# Patient Record
Sex: FEMALE | Race: WHITE | NOT HISPANIC OR LATINO | Employment: FULL TIME | URBAN - METROPOLITAN AREA
[De-identification: names, ages, dates, MRNs, and addresses within clinical notes are randomized per-mention and may not be internally consistent; named-entity substitution may affect disease eponyms.]

---

## 2017-05-26 ENCOUNTER — TELEPHONE (OUTPATIENT)
Dept: FAMILY MEDICINE | Facility: CLINIC | Age: 67
End: 2017-05-26

## 2017-05-26 DIAGNOSIS — E04.9 THYROID GOITER: Chronic | ICD-10-CM

## 2017-05-26 DIAGNOSIS — E78.00 HYPERCHOLESTEREMIA: Primary | Chronic | ICD-10-CM

## 2017-05-26 DIAGNOSIS — E03.4 HYPOTHYROIDISM DUE TO ACQUIRED ATROPHY OF THYROID: Chronic | ICD-10-CM

## 2017-05-26 DIAGNOSIS — R53.83 FATIGUE, UNSPECIFIED TYPE: ICD-10-CM

## 2017-05-26 DIAGNOSIS — E55.9 VITAMIN D DEFICIENCY: ICD-10-CM

## 2017-05-26 DIAGNOSIS — Z12.39 SCREENING FOR BREAST CANCER: ICD-10-CM

## 2017-05-26 DIAGNOSIS — I10 BENIGN HYPERTENSION: ICD-10-CM

## 2017-05-26 NOTE — TELEPHONE ENCOUNTER
Spoke to pt and notified her that I will send request once dr gupta gets back. She verbalized understanding. States she knows its not due just yet.

## 2017-05-26 NOTE — TELEPHONE ENCOUNTER
----- Message from RT Fabián sent at 5/26/2017  1:36 PM CDT -----  Contact: pt  or    pt  or , Requesting lab test , Mammogram, and Sonogram for thyroid orders, in and scheduled, please call pt to confirm, thanks.

## 2017-05-28 RX ORDER — ATORVASTATIN CALCIUM 10 MG/1
TABLET, FILM COATED ORAL
Qty: 30 TABLET | Refills: 8 | Status: SHIPPED | OUTPATIENT
Start: 2017-05-28 | End: 2018-01-30 | Stop reason: SDUPTHER

## 2017-05-28 RX ORDER — LEVOTHYROXINE SODIUM 50 UG/1
TABLET ORAL
Qty: 30 TABLET | Refills: 8 | Status: SHIPPED | OUTPATIENT
Start: 2017-05-28 | End: 2018-03-30 | Stop reason: SDUPTHER

## 2017-05-29 NOTE — TELEPHONE ENCOUNTER
Spoke to pt and notified her of orders placed. Pt verbalized understanding and states she will call back tomorrow to schedule.

## 2017-05-29 NOTE — TELEPHONE ENCOUNTER
MESSAGE REVIEWED. WILL ORDER THE LAB, MAMMOGRAM, AND U/S OF THE THYROID, BUT ALL NOT DUE UNTIL AFTER 7/22/17.HALIE

## 2017-06-06 ENCOUNTER — TELEPHONE (OUTPATIENT)
Dept: FAMILY MEDICINE | Facility: CLINIC | Age: 67
End: 2017-06-06

## 2017-06-06 NOTE — TELEPHONE ENCOUNTER
----- Message from Latasha Pike sent at 6/6/2017 10:21 AM CDT -----  Contact:  call [phone]    Calling to  Ask   Questions  About  Urine test  // please call

## 2017-06-13 DIAGNOSIS — M25.561 RIGHT KNEE PAIN, UNSPECIFIED CHRONICITY: Primary | ICD-10-CM

## 2017-06-14 ENCOUNTER — HOSPITAL ENCOUNTER (OUTPATIENT)
Dept: RADIOLOGY | Facility: HOSPITAL | Age: 67
Discharge: HOME | End: 2017-06-14
Attending: ORTHOPAEDIC SURGERY
Payer: COMMERCIAL

## 2017-06-14 ENCOUNTER — OFFICE VISIT (OUTPATIENT)
Dept: ORTHOPEDICS | Facility: CLINIC | Age: 67
End: 2017-06-14
Payer: COMMERCIAL

## 2017-06-14 VITALS — WEIGHT: 147.94 LBS | BODY MASS INDEX: 23.88 KG/M2

## 2017-06-14 DIAGNOSIS — M25.561 RIGHT KNEE PAIN, UNSPECIFIED CHRONICITY: Primary | ICD-10-CM

## 2017-06-14 DIAGNOSIS — M25.561 RIGHT KNEE PAIN, UNSPECIFIED CHRONICITY: ICD-10-CM

## 2017-06-14 PROCEDURE — 99999 PR PBB SHADOW E&M-EST. PATIENT-LVL II: CPT | Mod: PBBFAC,,, | Performed by: ORTHOPAEDIC SURGERY

## 2017-06-14 PROCEDURE — 73560 X-RAY EXAM OF KNEE 1 OR 2: CPT | Mod: TC,PO,LT

## 2017-06-14 PROCEDURE — 73560 X-RAY EXAM OF KNEE 1 OR 2: CPT | Mod: 26,LT,, | Performed by: RADIOLOGY

## 2017-06-14 PROCEDURE — 99213 OFFICE O/P EST LOW 20 MIN: CPT | Mod: S$GLB,,, | Performed by: ORTHOPAEDIC SURGERY

## 2017-06-14 PROCEDURE — 73562 X-RAY EXAM OF KNEE 3: CPT | Mod: 26,RT,, | Performed by: RADIOLOGY

## 2017-06-14 NOTE — PROGRESS NOTES
Opal Sahu, 66 years old, recurrent pain in the right knee, had cortisone   in the past, responded favorably.  She has a relative who had   viscosupplementation.  She is interested in this form of treatment for her   arthritic knee symptoms, but at this point, relatively mild.  Today, she reports   pain, 0/10 on the pain scale.    Exam shows tenderness at the joint line.  No signs of infection.  No   instability.    X-rays show medial joint space narrowing.    ASSESSMENT:  Right knee arthrosis.    PLAN:  We will schedule her for Euflexxa injection and we will get that done   once it is approved.      LAKESHA/TENISHA  dd: 06/14/2017 10:55:44 (CDT)  td: 06/15/2017 03:21:35 (CDT)  Doc ID   #[id number]  Job ID #[id number]    CC:

## 2017-06-19 ENCOUNTER — OFFICE VISIT (OUTPATIENT)
Dept: ORTHOPEDICS | Facility: CLINIC | Age: 67
End: 2017-06-19
Payer: COMMERCIAL

## 2017-06-19 ENCOUNTER — TELEPHONE (OUTPATIENT)
Dept: ORTHOPEDICS | Facility: CLINIC | Age: 67
End: 2017-06-19

## 2017-06-19 VITALS — BODY MASS INDEX: 23.63 KG/M2 | HEIGHT: 66 IN | WEIGHT: 147 LBS

## 2017-06-19 DIAGNOSIS — M25.561 CHRONIC PAIN OF RIGHT KNEE: Primary | ICD-10-CM

## 2017-06-19 DIAGNOSIS — G89.29 CHRONIC PAIN OF RIGHT KNEE: Primary | ICD-10-CM

## 2017-06-19 DIAGNOSIS — M17.11 PRIMARY OSTEOARTHRITIS OF RIGHT KNEE: ICD-10-CM

## 2017-06-19 PROCEDURE — 20610 DRAIN/INJ JOINT/BURSA W/O US: CPT | Mod: RT,S$GLB,, | Performed by: ORTHOPAEDIC SURGERY

## 2017-06-19 PROCEDURE — 99499 UNLISTED E&M SERVICE: CPT | Mod: S$GLB,,, | Performed by: ORTHOPAEDIC SURGERY

## 2017-06-19 PROCEDURE — 99999 PR PBB SHADOW E&M-EST. PATIENT-LVL III: CPT | Mod: PBBFAC,,, | Performed by: ORTHOPAEDIC SURGERY

## 2017-06-19 RX ORDER — HYALURONATE SODIUM 20 MG/2 ML
20 SYRINGE (ML) INTRAARTICULAR
Status: DISCONTINUED | OUTPATIENT
Start: 2017-06-19 | End: 2017-06-19 | Stop reason: HOSPADM

## 2017-06-19 RX ADMIN — Medication 20 MG: at 02:06

## 2017-06-19 NOTE — PROCEDURES
Large Joint Aspiration/Injection  Date/Time: 6/19/2017 2:51 PM  Performed by: SARAH ZURITA  Authorized by: SARAH ZURITA.     Consent Done?:  Yes (Verbal)  Indications:  Pain  Timeout: Prior to procedure the correct patient, procedure, and site was verified      Location:  Knee  Site:  R knee  Prep: Patient was prepped and draped in usual sterile fashion    Ultrasonic Guidance for needle placement: No  Needle size:  22 G  Approach:  Anterolateral  Medications:  20 mg EUFLEXXA 10 mg/mL(mw 2.4 -3.6 million)  Patient tolerance:  Patient tolerated the procedure well with no immediate complications

## 2017-06-19 NOTE — TELEPHONE ENCOUNTER
----- Message from Melissa Pardo sent at 6/19/2017 10:30 AM CDT -----  Contact: patient  Patient calling in regards to requesting to come in earlier today for her injection. She is scheduled for 1:30, but would like to come before 12 if possible. Please advise.  Call back   Thanks!

## 2017-07-03 ENCOUNTER — OFFICE VISIT (OUTPATIENT)
Dept: ORTHOPEDICS | Facility: CLINIC | Age: 67
End: 2017-07-03
Payer: COMMERCIAL

## 2017-07-03 VITALS — HEIGHT: 66 IN | BODY MASS INDEX: 23.63 KG/M2 | WEIGHT: 147 LBS

## 2017-07-03 DIAGNOSIS — G89.29 CHRONIC PAIN OF RIGHT KNEE: Primary | ICD-10-CM

## 2017-07-03 DIAGNOSIS — M25.561 CHRONIC PAIN OF RIGHT KNEE: Primary | ICD-10-CM

## 2017-07-03 DIAGNOSIS — M17.11 PRIMARY OSTEOARTHRITIS OF RIGHT KNEE: ICD-10-CM

## 2017-07-03 PROCEDURE — 99499 UNLISTED E&M SERVICE: CPT | Mod: S$GLB,,, | Performed by: ORTHOPAEDIC SURGERY

## 2017-07-03 PROCEDURE — 99999 PR PBB SHADOW E&M-EST. PATIENT-LVL II: CPT | Mod: PBBFAC,,, | Performed by: ORTHOPAEDIC SURGERY

## 2017-07-03 PROCEDURE — 20610 DRAIN/INJ JOINT/BURSA W/O US: CPT | Mod: RT,S$GLB,, | Performed by: ORTHOPAEDIC SURGERY

## 2017-07-03 RX ORDER — HYALURONATE SODIUM 20 MG/2 ML
20 SYRINGE (ML) INTRAARTICULAR
Status: DISCONTINUED | OUTPATIENT
Start: 2017-07-03 | End: 2017-07-03 | Stop reason: HOSPADM

## 2017-07-03 RX ADMIN — Medication 20 MG: at 02:07

## 2017-07-03 NOTE — PROCEDURES
Large Joint Aspiration/Injection  Date/Time: 7/3/2017 2:23 PM  Performed by: SARAH ZURITA  Authorized by: SARAH ZURITA.     Consent Done?:  Yes (Verbal)  Indications:  Pain  Timeout: Prior to procedure the correct patient, procedure, and site was verified      Location:  Knee  Site:  R knee  Prep: Patient was prepped and draped in usual sterile fashion    Ultrasonic Guidance for needle placement: No  Needle size:  22 G  Approach:  Anterolateral  Medications:  20 mg EUFLEXXA 10 mg/mL(mw 2.4 -3.6 million)  Patient tolerance:  Patient tolerated the procedure well with no immediate complications

## 2017-07-10 ENCOUNTER — OFFICE VISIT (OUTPATIENT)
Dept: ORTHOPEDICS | Facility: CLINIC | Age: 67
End: 2017-07-10
Payer: COMMERCIAL

## 2017-07-10 DIAGNOSIS — M17.11 OSTEOARTHRITIS OF RIGHT KNEE, UNSPECIFIED OSTEOARTHRITIS TYPE: Primary | ICD-10-CM

## 2017-07-10 PROCEDURE — 99999 PR PBB SHADOW E&M-EST. PATIENT-LVL II: CPT | Mod: PBBFAC,,, | Performed by: ORTHOPAEDIC SURGERY

## 2017-07-10 PROCEDURE — 99499 UNLISTED E&M SERVICE: CPT | Mod: S$GLB,,, | Performed by: ORTHOPAEDIC SURGERY

## 2017-07-10 PROCEDURE — 20610 DRAIN/INJ JOINT/BURSA W/O US: CPT | Mod: RT,S$GLB,, | Performed by: ORTHOPAEDIC SURGERY

## 2017-07-10 RX ORDER — HYALURONATE SODIUM 20 MG/2 ML
20 SYRINGE (ML) INTRAARTICULAR
Status: DISCONTINUED | OUTPATIENT
Start: 2017-07-10 | End: 2017-07-10 | Stop reason: HOSPADM

## 2017-07-10 RX ADMIN — Medication 20 MG: at 02:07

## 2017-07-10 NOTE — PROCEDURES
Large Joint Aspiration/Injection  Date/Time: 7/10/2017 2:01 PM  Performed by: SARAH ZURITA  Authorized by: SARAH ZURITA.     Consent Done?:  Yes (Verbal)  Indications:  Pain  Timeout: Prior to procedure the correct patient, procedure, and site was verified      Location:  Knee  Site:  R knee  Prep: Patient was prepped and draped in usual sterile fashion    Ultrasonic Guidance for needle placement: No  Needle size:  21 G  Approach:  Anterolateral  Medications:  20 mg EUFLEXXA 10 mg/mL(mw 2.4 -3.6 million)  Patient tolerance:  Patient tolerated the procedure well with no immediate complications

## 2017-08-04 ENCOUNTER — HOSPITAL ENCOUNTER (OUTPATIENT)
Dept: RADIOLOGY | Facility: HOSPITAL | Age: 67
Discharge: HOME | End: 2017-08-04
Attending: INTERNAL MEDICINE
Payer: COMMERCIAL

## 2017-08-04 DIAGNOSIS — Z12.39 SCREENING FOR BREAST CANCER: ICD-10-CM

## 2017-08-04 DIAGNOSIS — E04.9 THYROID GOITER: Chronic | ICD-10-CM

## 2017-08-04 DIAGNOSIS — Z12.31 VISIT FOR SCREENING MAMMOGRAM: ICD-10-CM

## 2017-08-04 PROCEDURE — 77063 BREAST TOMOSYNTHESIS BI: CPT | Mod: 26,,, | Performed by: RADIOLOGY

## 2017-08-04 PROCEDURE — 77067 SCR MAMMO BI INCL CAD: CPT | Mod: TC

## 2017-08-04 PROCEDURE — 77067 SCR MAMMO BI INCL CAD: CPT | Mod: 26,,, | Performed by: RADIOLOGY

## 2017-08-04 PROCEDURE — 76536 US EXAM OF HEAD AND NECK: CPT | Mod: 26,,, | Performed by: RADIOLOGY

## 2017-08-04 PROCEDURE — 76536 US EXAM OF HEAD AND NECK: CPT | Mod: TC,PO

## 2017-09-20 ENCOUNTER — OFFICE VISIT (OUTPATIENT)
Dept: FAMILY MEDICINE | Facility: CLINIC | Age: 67
End: 2017-09-20
Payer: COMMERCIAL

## 2017-09-20 VITALS
TEMPERATURE: 98 F | RESPIRATION RATE: 19 BRPM | HEIGHT: 66 IN | HEART RATE: 76 BPM | DIASTOLIC BLOOD PRESSURE: 86 MMHG | SYSTOLIC BLOOD PRESSURE: 120 MMHG | WEIGHT: 144 LBS | BODY MASS INDEX: 23.14 KG/M2

## 2017-09-20 DIAGNOSIS — E78.00 HYPERCHOLESTEREMIA: Primary | Chronic | ICD-10-CM

## 2017-09-20 DIAGNOSIS — Z23 NEED FOR TDAP VACCINATION: ICD-10-CM

## 2017-09-20 DIAGNOSIS — M19.019 ARTHRITIS, SHOULDER REGION: Chronic | ICD-10-CM

## 2017-09-20 DIAGNOSIS — M25.561 CHRONIC PAIN OF RIGHT KNEE: ICD-10-CM

## 2017-09-20 DIAGNOSIS — S05.01XA CONJUNCTIVAL ABRASION, RIGHT, INITIAL ENCOUNTER: ICD-10-CM

## 2017-09-20 DIAGNOSIS — H01.9: ICD-10-CM

## 2017-09-20 DIAGNOSIS — E03.4 HYPOTHYROIDISM DUE TO ACQUIRED ATROPHY OF THYROID: Chronic | ICD-10-CM

## 2017-09-20 DIAGNOSIS — S00.211A: ICD-10-CM

## 2017-09-20 DIAGNOSIS — E04.9 THYROID GOITER: Chronic | ICD-10-CM

## 2017-09-20 DIAGNOSIS — G89.29 CHRONIC PAIN OF RIGHT KNEE: ICD-10-CM

## 2017-09-20 PROCEDURE — 1159F MED LIST DOCD IN RCRD: CPT | Mod: S$GLB,,, | Performed by: INTERNAL MEDICINE

## 2017-09-20 PROCEDURE — 99214 OFFICE O/P EST MOD 30 MIN: CPT | Mod: 25,S$GLB,, | Performed by: INTERNAL MEDICINE

## 2017-09-20 PROCEDURE — 99999 PR PBB SHADOW E&M-EST. PATIENT-LVL III: CPT | Mod: PBBFAC,,, | Performed by: INTERNAL MEDICINE

## 2017-09-20 PROCEDURE — 90471 IMMUNIZATION ADMIN: CPT | Mod: S$GLB,,, | Performed by: INTERNAL MEDICINE

## 2017-09-20 PROCEDURE — 90715 TDAP VACCINE 7 YRS/> IM: CPT | Mod: S$GLB,,, | Performed by: INTERNAL MEDICINE

## 2017-09-20 PROCEDURE — 1125F AMNT PAIN NOTED PAIN PRSNT: CPT | Mod: S$GLB,,, | Performed by: INTERNAL MEDICINE

## 2017-09-20 PROCEDURE — 3079F DIAST BP 80-89 MM HG: CPT | Mod: S$GLB,,, | Performed by: INTERNAL MEDICINE

## 2017-09-20 PROCEDURE — 3074F SYST BP LT 130 MM HG: CPT | Mod: S$GLB,,, | Performed by: INTERNAL MEDICINE

## 2017-09-20 PROCEDURE — 3008F BODY MASS INDEX DOCD: CPT | Mod: S$GLB,,, | Performed by: INTERNAL MEDICINE

## 2017-09-20 RX ORDER — BIMATOPROST 3 UG/ML
SOLUTION TOPICAL
Qty: 5 ML | Refills: 12 | Status: SHIPPED | OUTPATIENT
Start: 2017-09-20 | End: 2018-03-01 | Stop reason: SDUPTHER

## 2017-09-20 RX ORDER — DOXYCYCLINE 100 MG/1
100 CAPSULE ORAL 2 TIMES DAILY
Qty: 16 CAPSULE | Refills: 1 | Status: SHIPPED | OUTPATIENT
Start: 2017-09-20 | End: 2018-05-28

## 2017-09-20 RX ORDER — MUPIROCIN CALCIUM 20 MG/G
CREAM TOPICAL
Qty: 30 G | Refills: 6 | Status: SHIPPED | OUTPATIENT
Start: 2017-09-20 | End: 2017-09-20 | Stop reason: SDUPTHER

## 2017-09-20 RX ORDER — BIMATOPROST 3 UG/ML
SOLUTION TOPICAL
Qty: 5 ML | Refills: 12 | OUTPATIENT
Start: 2017-09-20 | End: 2017-09-20 | Stop reason: SDUPTHER

## 2017-09-20 RX ORDER — MUPIROCIN CALCIUM 20 MG/G
CREAM TOPICAL
Qty: 30 G | Refills: 6 | OUTPATIENT
Start: 2017-09-20 | End: 2017-09-20 | Stop reason: SDUPTHER

## 2017-09-20 RX ORDER — BIMATOPROST 3 UG/ML
SOLUTION TOPICAL
Qty: 5 ML | Refills: 12 | Status: SHIPPED | OUTPATIENT
Start: 2017-09-20 | End: 2017-09-20 | Stop reason: SDUPTHER

## 2017-09-20 RX ORDER — MUPIROCIN CALCIUM 20 MG/G
CREAM TOPICAL
Qty: 30 G | Refills: 6 | Status: SHIPPED | OUTPATIENT
Start: 2017-09-20 | End: 2018-05-28

## 2017-09-20 RX ORDER — MUPIROCIN CALCIUM 20 MG/G
CREAM TOPICAL 2 TIMES DAILY
Qty: 30 G | Refills: 6 | Status: SHIPPED | OUTPATIENT
Start: 2017-09-20 | End: 2020-10-08 | Stop reason: SDUPTHER

## 2017-09-20 RX ORDER — CIPROFLOXACIN HYDROCHLORIDE 3 MG/ML
1 SOLUTION/ DROPS OPHTHALMIC
Qty: 2.5 ML | Refills: 1 | Status: SHIPPED | OUTPATIENT
Start: 2017-09-20 | End: 2018-05-28

## 2017-09-20 NOTE — PROGRESS NOTES
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  Also, she has had a 1-2 day history of   rather significant pain, discomfort, and draining and swelling noted at the   right eye, particularly the upper lid consistent with inflammation and probable   early infection due to a scraping apparently by history of her contact lens.  No   definite fever associated, but slight blurring of vision noted at times with   this condition.  See exam and recommendation below.    At the present time and since the previous office visit in the past back on   08/23/2016, she has had no ER visits or hospitalizations or specific medical   problems or complaints.  She carries a diagnosis of hypercholesterolemia, on   lipid-lowering agents and diet.  She is due a lab prior and did have lab on   08/04/2017.  Regarding her lipids, she demonstrated an excellent profile and   response to her medication with a TC of 199, , HDL 64, and LDL was 115.    We will continue to see exam and recommendation below.    The patient also has hypothyroidism with thyroid goiter.  She has a negative   thyroid peroxidase antibody back in July of last year.  The TSH on 08/04 was  0.523.  We will continue with the present dose 50 mcg per day, but will   repeat on proper intervals of time.    Arthritis of the right shoulder, marked improvement and no significant further   problems or complications noted at present.  Precautions given to continue her   gradual exercise and activity programs.    COMPLETE REVIEW OF SYSTEMS:  Otherwise unremarkable.  There has been no evidence   of any fever, chills or sweats again.  HEENT:  No prominent visual or hearing changes, headache or syncope.  RESPIRATORY:  No wheeze or cough.  HEART:  No angina pain.  No palpitations.   No evidence of GI or  abnormalities.  She has had a colonoscopy back with Dr. Sidhu in the past, but we will be calling regarding  the next due date as   recommended.  No BM change, melena or dysphagia or dyspepsia.  No    abnormalities, unstable or overactive bladder, UTI, etc.  No renal colic.  NEUROLOGICAL:  The patient has had no evidence of severe mood or depressed   state.  No motor or sensory abnormalities or tremor, seizure-like activity or   syncope.    Immunizations reviewed a Tdap given today, but will be having her Prevnar 13 and   shingles vaccine at her pharmacy and will obtain the Pneumovax 23 in 1 year.    PHYSICAL EXAMINATION:  GENERAL:  Reveals a well-developed, well-nourished white female.  VITAL SIGNS:  See vital signs and measurements.  ARTERIES AND VEINS:  Good peripheral pulsation.  Normal carotid upstroke and   amplitude.  There was no edema or phlebitis.  SKIN:  Warm, dry.  No lesions or exanthems.  NODES:  None felt in the cervical, axillary or supraclavicular region.  NEUROLOGICAL:  The patient is slightly anxious and tense because of infection.    Motor tone and strength fairly good and equal bilaterally.  Cranial nerves   grossly intact II-XII.  Gait normal.  No tremor noted.  HEENT:  Reveals significant slight pain, tenderness, and swelling noted of the   upper lid and marked conjunctivitis and clear to slightly cloudy discharge.  No   definite visual changes obvious at this time on exam.  External eye movements   are intact.  Ears clear, no pharyngeal involvement.  Minimal rhinitis.  NECK:  No masses or thyroid.  LUNGS:  Clear.  No wheezes or rales.  HEART:  Regular rhythm, 68 beats per minute.  No gallop or arrhythmia.  ABDOMINAL:  Soft.  No localized pain.  No organs or masses felt at this time.    Bowel tones were present.  EXTREMITIES:  No edema, acute arthritis or phlebitis.    IMPRESSION:  At this time:  1.  Probable abrasion of the eye with subsequent evidence of inflammatory and   infectious response with recommended and ordered today Ciloxan  1 drop q. 2 hours in   the right eye.  For pain, Tylenol two  q. 4 hours p.r.n., doxycycline 100 mg   b.i.d. and referred to Ophthalmology here, but unavailable, and after   discussion, will obtain in the late p.m. tomorrow her doctor in [place].    Warm to hot compresses t.i.d. to q.i.d.  2.  Hypercholesterolemia.  We will continue lipid-lowering agents and diet.  No   vascular symptoms associated.  Precautions, however, given.  3.  Hypothyroidism, euthyroid and will continue to 50 mcg per day.  4.  Thyroid goiter, stable.  No change on 07/22/2016 with a thyroid ultrasound.    Reassured.  5.  Chronic pain in the right knee, slight improvement noted, but we will   continue with followup with orthopedist, but no definite fall risk noted at   present.    The patient underwent a Tdap vaccination day prior to discharge and again, we   will obtain the additional recommended vaccines at the local pharmacy with   Prevnar 13 and a flu vaccine and Pneumovax-23 in 1 year.    The patient was reassured, but precautions given regarding her eye infection and   to definitely maintain her consult.  Routine visit in the next six months or as   needed.  Strict precautions given.        ATA/LIANA  dd: 09/20/2017 17:36:54 (CDT)  td: 09/21/2017 07:41:38 (CDT)  Doc ID   #[id number]  Job ID #[id number]    CC:    This office note has been dictated.

## 2018-01-18 ENCOUNTER — TELEPHONE (OUTPATIENT)
Dept: FAMILY MEDICINE | Facility: CLINIC | Age: 68
End: 2018-01-18

## 2018-01-18 NOTE — TELEPHONE ENCOUNTER
Spoke to pt and she stated that she found the information she needed and no longer needed to speak to anyone here at the clinic.

## 2018-01-18 NOTE — TELEPHONE ENCOUNTER
----- Message from Fauzia Simms sent at 1/18/2018  9:47 AM CST -----  Contact: self  Needs to discuss mammogram and lab work. Please call back at

## 2018-01-30 DIAGNOSIS — E78.00 HYPERCHOLESTEREMIA: Primary | ICD-10-CM

## 2018-01-30 RX ORDER — ATORVASTATIN CALCIUM 10 MG/1
10 TABLET, FILM COATED ORAL DAILY
Qty: 30 TABLET | Refills: 9 | Status: SHIPPED | OUTPATIENT
Start: 2018-01-30 | End: 2018-05-28 | Stop reason: SDUPTHER

## 2018-01-30 NOTE — TELEPHONE ENCOUNTER
----- Message from Fauzia Simms sent at 1/30/2018 11:43 AM CST -----  [facility]  Needs refill on bimatoprost (LATISSE) 0.03 % ophthalmic solution. Please call back at   [facility] #[id number] [place], [place] - [id number] [address]  Phone: [phone] Fax: [phone]

## 2018-03-01 NOTE — TELEPHONE ENCOUNTER
----- Message from Siena Higgins sent at 3/1/2018  1:44 PM CST -----  Contact: self  Type:  RX Refill Request    Who Called:  self  Refill or New Rx:  new  RX Name and Strength:  LATISSE. Tazarac  How is the patient currently taking it? (ex. 1XDay):  1XDAY  Is this a 30 day or 90 day RX:  30  Preferred Pharmacy with phone number:  [facility] at [phone]  Local or Mail Order:  local  Ordering Provider:  Dr Bey  [facility] Call Back Number:  [phone]  Additional Information:  Patient has changed pharmacy to [facility] on [address]. Please add to pharmacy list.

## 2018-03-02 ENCOUNTER — TELEPHONE (OUTPATIENT)
Dept: FAMILY MEDICINE | Facility: CLINIC | Age: 68
End: 2018-03-02

## 2018-03-02 NOTE — TELEPHONE ENCOUNTER
Call transferred to me from phone room from [facility] in another encounter

## 2018-03-02 NOTE — TELEPHONE ENCOUNTER
Call from [facility] (spoke to Radha) transferred from Phone room. Request refill for synthroid for pt currently at pharmacy. Medication confirmed in Pt chart as synthroid 50 mcg no refills.

## 2018-03-03 RX ORDER — BIMATOPROST 3 UG/ML
SOLUTION TOPICAL
Qty: 5 ML | Refills: 6 | OUTPATIENT
Start: 2018-03-03 | End: 2018-05-28 | Stop reason: SDUPTHER

## 2018-04-02 RX ORDER — LEVOTHYROXINE SODIUM 50 UG/1
TABLET ORAL
Qty: 30 TABLET | Refills: 0 | Status: SHIPPED | OUTPATIENT
Start: 2018-04-02 | End: 2018-04-28 | Stop reason: SDUPTHER

## 2018-04-02 NOTE — TELEPHONE ENCOUNTER
----- Message from Nidia Peterson sent at 3/31/2018 10:47 AM CDT -----  Contact: Opal  Patient is calling again as had same situation last month with getting Rx SYNTHROID 50 mcg tablet, TAKE ONE TABLET BY MOUTH EVERY DAY refilled. States did get two pills to last the weekend but needs Rx refill Monday 4/2/18.     [facility]/pharmacy #[id number] - [place] - [address]  [address]  [place] [address]  Phone: [phone] Fax: [phone]    Please call when sent to pharmacy [phone]. Thanks!

## 2018-04-28 ENCOUNTER — TELEPHONE (OUTPATIENT)
Dept: FAMILY MEDICINE | Facility: CLINIC | Age: 68
End: 2018-04-28

## 2018-04-28 NOTE — TELEPHONE ENCOUNTER
The patient is unable to refill her prescriptions after multiple attempts, please refer for refill as she has an appointment in July by history  Apparently I have been restricted from refilling medicines at present. Kelly

## 2018-04-29 RX ORDER — LEVOTHYROXINE SODIUM 50 UG/1
TABLET ORAL
Qty: 30 TABLET | Refills: 0 | Status: SHIPPED | OUTPATIENT
Start: 2018-04-29 | End: 2018-05-25 | Stop reason: SDUPTHER

## 2018-05-23 ENCOUNTER — TELEPHONE (OUTPATIENT)
Dept: FAMILY MEDICINE | Facility: CLINIC | Age: 68
End: 2018-05-23

## 2018-05-23 DIAGNOSIS — Z12.31 ENCOUNTER FOR SCREENING MAMMOGRAM FOR BREAST CANCER: Primary | ICD-10-CM

## 2018-05-23 NOTE — TELEPHONE ENCOUNTER
----- Message from Lara Scott sent at 5/23/2018  2:58 PM CDT -----  Contact: self   Patient want to speak with a nurse regarding orders she need for blood work, scan of thyroid and mammogram for 7/25/18 before her scheduled appointment. Please call back at [phone]

## 2018-05-25 RX ORDER — LEVOTHYROXINE SODIUM 50 UG/1
TABLET ORAL
Qty: 30 TABLET | Refills: 0 | Status: SHIPPED | OUTPATIENT
Start: 2018-05-25 | End: 2018-05-28 | Stop reason: SDUPTHER

## 2018-05-28 ENCOUNTER — OFFICE VISIT (OUTPATIENT)
Dept: FAMILY MEDICINE | Facility: CLINIC | Age: 68
End: 2018-05-28
Payer: COMMERCIAL

## 2018-05-28 VITALS
HEIGHT: 66 IN | RESPIRATION RATE: 16 BRPM | HEART RATE: 64 BPM | SYSTOLIC BLOOD PRESSURE: 136 MMHG | DIASTOLIC BLOOD PRESSURE: 82 MMHG

## 2018-05-28 DIAGNOSIS — E78.00 HYPERCHOLESTEREMIA: ICD-10-CM

## 2018-05-28 DIAGNOSIS — Z00.00 WELLNESS EXAMINATION: Primary | ICD-10-CM

## 2018-05-28 DIAGNOSIS — Z12.39 SCREENING FOR BREAST CANCER: ICD-10-CM

## 2018-05-28 PROCEDURE — 3075F SYST BP GE 130 - 139MM HG: CPT | Mod: CPTII,S$GLB,, | Performed by: FAMILY MEDICINE

## 2018-05-28 PROCEDURE — 99213 OFFICE O/P EST LOW 20 MIN: CPT | Mod: 25,S$GLB,, | Performed by: FAMILY MEDICINE

## 2018-05-28 PROCEDURE — 3079F DIAST BP 80-89 MM HG: CPT | Mod: CPTII,S$GLB,, | Performed by: FAMILY MEDICINE

## 2018-05-28 PROCEDURE — 99999 PR PBB SHADOW E&M-EST. PATIENT-LVL III: CPT | Mod: PBBFAC,,, | Performed by: FAMILY MEDICINE

## 2018-05-28 PROCEDURE — 99397 PER PM REEVAL EST PAT 65+ YR: CPT | Mod: S$GLB,,, | Performed by: FAMILY MEDICINE

## 2018-05-28 RX ORDER — VALACYCLOVIR HYDROCHLORIDE 1 G/1
2000 TABLET, FILM COATED ORAL 2 TIMES DAILY
Qty: 30 TABLET | Refills: 1 | Status: SHIPPED | OUTPATIENT
Start: 2018-05-28 | End: 2019-10-07 | Stop reason: SDUPTHER

## 2018-05-28 RX ORDER — ATORVASTATIN CALCIUM 10 MG/1
10 TABLET, FILM COATED ORAL DAILY
Qty: 90 TABLET | Refills: 3 | Status: SHIPPED | OUTPATIENT
Start: 2018-05-28 | End: 2019-06-27 | Stop reason: SDUPTHER

## 2018-05-28 RX ORDER — LEVOTHYROXINE SODIUM 50 UG/1
50 TABLET ORAL DAILY
Qty: 90 TABLET | Refills: 3 | Status: SHIPPED | OUTPATIENT
Start: 2018-05-28 | End: 2019-06-27 | Stop reason: SDUPTHER

## 2018-05-28 RX ORDER — BIMATOPROST 3 UG/ML
SOLUTION TOPICAL
Qty: 5 ML | Refills: 0 | Status: SHIPPED | OUTPATIENT
Start: 2018-05-28 | End: 2019-10-07 | Stop reason: SDUPTHER

## 2018-05-28 RX ORDER — VALACYCLOVIR HYDROCHLORIDE 500 MG/1
500 TABLET, FILM COATED ORAL 2 TIMES DAILY
COMMUNITY
End: 2018-05-28

## 2018-05-28 NOTE — PROGRESS NOTES
Subjective:       Patient ID: Opal Sahu is a 67 y.o. female.    Chief Complaint: Establish Care (Patient here to establish care)    Here for annual exam. Doing well overall and due for labs.      Review of Systems   Constitutional: Negative for chills, fatigue and fever.   Respiratory: Negative for cough, chest tightness and shortness of breath.    Cardiovascular: Negative for chest pain, palpitations and leg swelling.   Gastrointestinal: Negative for abdominal distention and abdominal pain.   Endocrine: Negative for cold intolerance and heat intolerance.   Skin: Negative for rash.   Psychiatric/Behavioral: Negative for dysphoric mood. The patient is not nervous/anxious.        Objective:      Physical Exam   Constitutional: She appears well-developed and well-nourished.   HENT:   Head: Normocephalic and atraumatic.   Cardiovascular: Normal rate, regular rhythm and normal heart sounds.    Pulmonary/Chest: Effort normal and breath sounds normal.   Psychiatric: She has a normal mood and affect.   Nursing note and vitals reviewed.      Assessment:       1. Wellness examination    2. Hypercholesteremia    3. Screening for breast cancer        Plan:       Wellness examination  -     CBC auto differential; Future; Expected date: 05/28/2018  -     Comprehensive metabolic panel; Future; Expected date: 05/28/2018  -     Lipid panel; Future; Expected date: 05/28/2018  -     TSH; Future; Expected date: 05/28/2018    Hypercholesteremia  -     atorvastatin (LIPITOR) 10 MG tablet; Take 1 tablet (10 mg total) by mouth once daily.  Dispense: 90 tablet; Refill: 3    Screening for breast cancer  -     Mammo Digital Screening Bilat with CAD; Future; Expected date: 05/28/2018    Other orders  -     Cancel: Mammo Digital Screening Bilateral With CAD; Future; Expected date: 05/28/2018  -     SYNTHROID 50 mcg tablet; Take 1 tablet (50 mcg total) by mouth once daily.  Dispense: 90 tablet; Refill: 3  -     valACYclovir (VALTREX)  1000 MG tablet; Take 2 tablets (2,000 mg total) by mouth 2 (two) times daily.  Dispense: 30 tablet; Refill: 1  -     bimatoprost (LATISSE) 0.03 % ophthalmic solution; Place one drop on applicator and apply evenly along the skin of the upper eyelid at base of eyelashes once daily at bedtime; repeat procedure for second eye (use a clean applicator).  Dispense: 5 mL; Refill: 0    update labs and health maintenance.  Will monitor chronic medical issues and continue current plan of care.        Follow-up in about 6 months (around 11/28/2018), or if symptoms worsen or fail to improve.

## 2018-06-27 RX ORDER — LEVOTHYROXINE SODIUM 50 UG/1
TABLET ORAL
Qty: 30 TABLET | Refills: 0 | Status: SHIPPED | OUTPATIENT
Start: 2018-06-27 | End: 2019-06-28 | Stop reason: SDUPTHER

## 2018-07-25 ENCOUNTER — TELEPHONE (OUTPATIENT)
Dept: FAMILY MEDICINE | Facility: CLINIC | Age: 68
End: 2018-07-25

## 2018-07-25 DIAGNOSIS — E04.1 THYROID NODULE: Primary | ICD-10-CM

## 2018-07-25 NOTE — TELEPHONE ENCOUNTER
----- Message from Geovanna Arteaga sent at 7/25/2018  9:10 AM CDT -----  Type: Needs order for Ultrasound    Who Called:  Patient  Best Call Back Number: [phone]  Additional Information: Patient requested Ultrasound for thyroid be ordered /no order in system/please order and call patient back to schedule (requesting to have done same day as Mammogram and lab work)

## 2018-07-25 NOTE — TELEPHONE ENCOUNTER
Patient requesting ultrasound of thyroid on 8/11 along with mammo and lab work on same day. Please advise.

## 2018-07-26 ENCOUNTER — TELEPHONE (OUTPATIENT)
Dept: FAMILY MEDICINE | Facility: CLINIC | Age: 68
End: 2018-07-26

## 2018-07-26 NOTE — TELEPHONE ENCOUNTER
----- Message from Nidia Peterson sent at 7/26/2018 11:41 AM CDT -----  Contact: Opal  Type: Needs Medical Advice    Who Called:  patient  Symptoms (please be specific):  na  How long has patient had these symptoms:  sloan  Pharmacy name and phone #:  [facility]  Best Call Back Number: [phone]  Additional Information:  Asking for order of Ultrasound for Thyroid to be entered and scheduled same day as other testing on 8/11/18. Please call to confirm scheduled. Thanks!

## 2018-07-26 NOTE — TELEPHONE ENCOUNTER
----- Message from Marino Lanza sent at 7/26/2018 11:33 AM CDT -----  Contact: Pt  Name of Who is Calling: Opal      What is the request in detail: Pt is calling requesting to speak with a nurse in regards to results      Can the clinic reply by [facility]: no      [facility] [phone] (home)

## 2018-08-06 ENCOUNTER — HOSPITAL ENCOUNTER (OUTPATIENT)
Dept: RADIOLOGY | Facility: HOSPITAL | Age: 68
Discharge: HOME | End: 2018-08-06
Attending: FAMILY MEDICINE
Payer: COMMERCIAL

## 2018-08-06 DIAGNOSIS — E04.1 THYROID NODULE: ICD-10-CM

## 2018-08-06 PROCEDURE — 76536 US EXAM OF HEAD AND NECK: CPT | Mod: TC,PO

## 2018-08-06 PROCEDURE — 76536 US EXAM OF HEAD AND NECK: CPT | Mod: 26,,, | Performed by: RADIOLOGY

## 2018-08-11 ENCOUNTER — HOSPITAL ENCOUNTER (OUTPATIENT)
Dept: RADIOLOGY | Facility: HOSPITAL | Age: 68
Discharge: HOME | End: 2018-08-11
Attending: FAMILY MEDICINE
Payer: COMMERCIAL

## 2018-08-11 DIAGNOSIS — Z12.39 SCREENING FOR BREAST CANCER: ICD-10-CM

## 2018-08-11 PROCEDURE — 77067 SCR MAMMO BI INCL CAD: CPT | Mod: 26,,, | Performed by: RADIOLOGY

## 2018-08-11 PROCEDURE — 77067 SCR MAMMO BI INCL CAD: CPT | Mod: TC,PO

## 2018-08-11 PROCEDURE — 77063 BREAST TOMOSYNTHESIS BI: CPT | Mod: 26,,, | Performed by: RADIOLOGY

## 2018-08-13 ENCOUNTER — TELEPHONE (OUTPATIENT)
Dept: FAMILY MEDICINE | Facility: CLINIC | Age: 68
End: 2018-08-13

## 2018-08-13 DIAGNOSIS — E04.1 THYROID NODULE: Primary | ICD-10-CM

## 2018-08-15 ENCOUNTER — TELEPHONE (OUTPATIENT)
Dept: ENDOCRINOLOGY | Facility: CLINIC | Age: 68
End: 2018-08-15

## 2018-08-15 NOTE — TELEPHONE ENCOUNTER
----- Message from Sunil Armendariz sent at 8/15/2018  4:15 PM CDT -----  Patient needs to schedule thyroid u/s follow up per Dr. Bailey - please call to advise [phone]

## 2018-08-15 NOTE — TELEPHONE ENCOUNTER
LM informing pt she is scheduled to see Dr. Cade per Dr. Bailey on 10/22/18 at 10:00 am.  Will also add her to the wait list.  Call if any questions.

## 2018-08-16 ENCOUNTER — TELEPHONE (OUTPATIENT)
Dept: ENDOCRINOLOGY | Facility: CLINIC | Age: 68
End: 2018-08-16

## 2018-08-16 NOTE — TELEPHONE ENCOUNTER
----- Message from Cintia Bhatia sent at 8/15/2018  4:50 PM CDT -----  Contact: self [phone] please call her after 2:30  She called back, please call her regarding her test results.  Thank you!

## 2018-08-17 ENCOUNTER — TELEPHONE (OUTPATIENT)
Dept: ENDOCRINOLOGY | Facility: CLINIC | Age: 68
End: 2018-08-17

## 2018-08-17 NOTE — TELEPHONE ENCOUNTER
Pt referred by Dr. Bailey for possible FNA per thyroid us. Pt asking to be seen sooner if appropriate/urgent (coming in for 10/29/18 at present)  Please advise if sooner appt needed    Pt on waiting list

## 2018-08-17 NOTE — TELEPHONE ENCOUNTER
----- Message from Nitesh Diaz sent at 8/16/2018  4:52 PM CDT -----  Contact: pt  Type:  Patient Returning Call    Who Called:  pt  Who Left Message for Patient:  dada iglesias  Does the patient know what this is regarding?:  Thyroid scanned / needs biopsy appointment  Best Call Back Number:   (pt is off after 1:45 pm. Please dont call before then)   Additional Information:

## 2018-08-17 NOTE — TELEPHONE ENCOUNTER
Left message for pt to call back clinic if any questions re: appt for 10/29/18 at 3:00pm, referred by Dr. Bailey for thyroid us, nodule and possible FNA

## 2018-08-21 ENCOUNTER — TELEPHONE (OUTPATIENT)
Dept: ENDOCRINOLOGY | Facility: CLINIC | Age: 68
End: 2018-08-21

## 2018-08-21 NOTE — TELEPHONE ENCOUNTER
Left vm for pt. Calling pt to offer earlier appt per Dr. Cade approval (possibly 9/12/18 for 3:00pm)  Instructed to call back

## 2018-08-21 NOTE — TELEPHONE ENCOUNTER
Dr. Cade, pt will be seeing you as NP on 9/12/18.  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  Please advise.

## 2018-08-22 NOTE — TELEPHONE ENCOUNTER
Spoke w/ pt, will possibly do the FNA on the day of her visit 9/12/18.  Advised no ASA/blood thinners x 72 hrs prior, no NSAIDS.  Pt verbalized understanding.

## 2018-08-30 ENCOUNTER — TELEPHONE (OUTPATIENT)
Dept: FAMILY MEDICINE | Facility: CLINIC | Age: 68
End: 2018-08-30

## 2018-08-30 NOTE — TELEPHONE ENCOUNTER
----- Message from Tremayne Basilio sent at 8/30/2018  4:14 PM CDT -----  Contact: patient  Type:  Test Results    Who Called:  patient  Name of Test (Labs):    Date of Test:  08/11  Ordering Provider:    Where the test was performed:  [facility] Call Back Number:  [phone]  Additional Information:  Requesting a call back to discuss test results

## 2018-09-12 ENCOUNTER — OFFICE VISIT (OUTPATIENT)
Dept: ENDOCRINOLOGY | Facility: CLINIC | Age: 68
End: 2018-09-12
Payer: COMMERCIAL

## 2018-09-12 VITALS
SYSTOLIC BLOOD PRESSURE: 122 MMHG | HEART RATE: 94 BPM | WEIGHT: 145 LBS | BODY MASS INDEX: 23.3 KG/M2 | DIASTOLIC BLOOD PRESSURE: 88 MMHG | HEIGHT: 66 IN

## 2018-09-12 DIAGNOSIS — E03.9 HYPOTHYROIDISM, UNSPECIFIED TYPE: ICD-10-CM

## 2018-09-12 DIAGNOSIS — E04.2 MULTINODULAR GOITER: Primary | ICD-10-CM

## 2018-09-12 PROCEDURE — 99204 OFFICE O/P NEW MOD 45 MIN: CPT | Mod: 25,S$GLB,, | Performed by: INTERNAL MEDICINE

## 2018-09-12 PROCEDURE — 1101F PT FALLS ASSESS-DOCD LE1/YR: CPT | Mod: CPTII,S$GLB,, | Performed by: INTERNAL MEDICINE

## 2018-09-12 PROCEDURE — 99999 PR PBB SHADOW E&M-EST. PATIENT-LVL III: CPT | Mod: PBBFAC,,, | Performed by: INTERNAL MEDICINE

## 2018-09-12 PROCEDURE — 76942 ECHO GUIDE FOR BIOPSY: CPT | Mod: S$GLB,,, | Performed by: INTERNAL MEDICINE

## 2018-09-12 PROCEDURE — 88173 CYTOPATH EVAL FNA REPORT: CPT | Mod: 26,,, | Performed by: PATHOLOGY

## 2018-09-12 PROCEDURE — 88173 CYTOPATH EVAL FNA REPORT: CPT | Performed by: PATHOLOGY

## 2018-09-12 PROCEDURE — 3074F SYST BP LT 130 MM HG: CPT | Mod: CPTII,S$GLB,, | Performed by: INTERNAL MEDICINE

## 2018-09-12 PROCEDURE — 3079F DIAST BP 80-89 MM HG: CPT | Mod: CPTII,S$GLB,, | Performed by: INTERNAL MEDICINE

## 2018-09-12 PROCEDURE — 10022 PR FINE NEEDLE ASP;W/IMAGING GUIDANCE: CPT | Mod: S$GLB,,, | Performed by: INTERNAL MEDICINE

## 2018-09-12 NOTE — PROGRESS NOTES
CHIEF COMPLAINT: Multinodular Goiter  68 year old being seen as a f/u. Last seen in 2014. On synthroid 50 mcg. Had an FNA > 10 years ago. No Difficulty swallowing. No change in voice. No XRT to head/neck. No Palpitations. No tremors.                     PAST MEDICAL HISTORY/PAST SURGICAL HISTORY:  Reviewed in [facility]     SOCIAL HISTORY: No Tobacco. No alcohol     FAMILY HISTORY: GM had a partial thyroidectomy. No cancer. No DM. + Heart disease      MEDICATIONS/ALLERGIES: The patient's MedCard has been updated and reviewed.      ROS:   Constitutional: No recent significant weight change  Eyes: No recent visual changes  ENT: No dysphagia  Cardiovascular: Denies current anginal symptoms  Respiratory: Denies current respiratory difficulty  Gastrointestinal: Denies recent bowel disturbances  GenitoUrinary - No dysuria  Skin: No new skin rash  Neurologic: No focal neurologic complaints  Remainder ROS negative           PE:    GENERAL: Well developed, well nourished.  NECK: Supple, trachea midline, Bilateral nodules palpable  CHEST: Resp even and unlabored, CTA bilateral.  CARDIAC: RRR, S1, S2 heard, no murmurs, rubs, S3, or S4    Results for ROBERT, EUSEBIA L (MRN [id number]) as of 9/12/2018 15:00   Ref. Range 8/11/2018 08:22   TSH Latest Ref Range: 0.400 - 4.000 uIU/mL 0.598        US SOFT TISSUE HEAD NECK THYROID    CLINICAL HISTORY:  Nontoxic single thyroid nodule    TECHNIQUE:  Ultrasound of the thyroid and cervical lymph nodes was performed.    COMPARISON:  Thyroid ultrasound dated 08/04/2017    FINDINGS:  The thyroid gland remains mildly enlarged.  There is a multinodular appearance with solid and cystic nodules scattered throughout both lobes of the gland as well as in the isthmus.  The right lobe measures 6.3 x 2.1 x 1.7 cm with an estimated volume of 11.4 mL.  The thyroid isthmus measures 6 mm in thickness.  The left lobe measures 6.2 x 1.5 x 1.7 cm with an estimated volume of 8.4 mL.  Total thyroid volume is 19.8  mL, no change compared to 19.6 mL on the prior study.    There are multiple cystic and solid nodules in the right lobe.  There is a stable 1.2 x 0.8 x 0.7 cm solid nodule in the lower pole.  There is a 9 mm calcifying lesion in the lower pole, unchanged.  There is an adjacent solid and cystic nodule measuring 9 x 9 8 mm without significant change.  There is a 1.1 x 1.2 x 0.9 cm solid nodule in the lower pole medially without change.  There is a 2.1 x 1.8 x 0.9 cm solid nodule with internal cystic degeneration in the medial midpole of the right lobe which was not clearly present on the prior study.    There is an essentially stable 1.5 x 1.3 x 0.6 cm solid and cystic nodule in the left lateral aspect of the isthmus.    There are multiple solid and cystic nodules in the left lobe of the gland.  There is a 1.3 x 1.2 x 0.9 cm solid nodule in the lateral midpole without change.  There is a solid and cystic nodule in the lower pole measuring 1.5 x 1.2 x 0.8 cm with microcalcifications, again without significant change.  There are adjacent solid and cystic subcentimeter nodules.    There is no pathologic lymphadenopathy.      Impression       1. The thyroid gland is mildly enlarged with a multinodular appearance.  There are multiple solid and cystic nodules throughout the entire gland as well as in the isthmus.  For the most part, these multiple nodules are without detrimental change.  There is however a 2.1 x 1.8 x 0.9 cm solid and cystic nodule in the medial midpole of the right lobe which was not clearly present on the prior study.  This nodule does meet criteria for FNA or biopsy by size criteria.  2. There is no pathologic lymphadenopathy.  This report was flagged in Epic as abnormal.            ASSESSMENT/PLAN:  1. Multinodular Goiter- With a Hx of FNA in the past. This has been followed for several years. There is a nodule on the right that meets criteria for FNA. When reviewing old US images appears that it was  there before but more clearly defined in recent US. Will do FNA today     2. Hypothyroid- TSH WNL. Continue current Tx.     FOLLOWUP

## 2018-09-12 NOTE — LETTER
September 12, 2018      JOLENE Bailey MD  [address] [facility] [address] [place] [address]           [facility]  [address] [facility] [address] [place] [address]  Phone: [phone]  Fax: [phone]          Patient: Opal Sahu   MR Number: [id number]   YOB: 1950   Date of Visit: 9/12/2018       Dear Dr. JOLENE Bailey:    Thank you for referring Opal Sahu to me for evaluation. Attached you will find relevant portions of my assessment and plan of care.    If you have questions, please do not hesitate to call me. I look forward to following Opal Sahu along with you.    Sincerely,    Seamus Cade,     Enclosure  CC:  No Recipients    If you would like to receive this communication electronically, please contact [email or web address] or [phone] to request more information on [employer] Link access.    For providers and/or their staff who would like to refer a patient to [facility], please contact us through our one-stop-shop provider referral line, [facility], at [phone].    If you feel you have received this communication in error or would no longer like to receive these types of communications, please e-mail [email or web address]

## 2018-09-12 NOTE — PROGRESS NOTES
Thyroid ultrasound for FNA    Indication:  Ultrasound guidance for fine needle aspiration biopsy on the right  Procedure time out noted. Patient's name, , and location of biopsy were verified with patient. Discussed risks of procedure and risks of a non diagnostic biopsy    Real time ultrasound was performed in 2 planes using a Marisol ultrasound machine and 12 MHz probe.   The right nodule was identified and described in report.  Informed consent obtained and questions answered. FNA guidance was performed using direct u/s guidance to confirm accurate needle placement.  4 aspirations were made using 25 gauge needles.  Samples were submitted for cytology.  The patient tolerated the procedure well without complication.  After care instructions were provided.        Impression:  Uncomplicated FNA biopsy of right thyroid nodule under U/S guidance.

## 2018-09-14 ENCOUNTER — TELEPHONE (OUTPATIENT)
Dept: ENDOCRINOLOGY | Facility: CLINIC | Age: 68
End: 2018-09-14

## 2018-09-14 DIAGNOSIS — E04.2 MULTINODULAR GOITER: Primary | ICD-10-CM

## 2018-09-14 NOTE — TELEPHONE ENCOUNTER
----- Message from Nidia Hernandez sent at 9/14/2018  1:58 PM CDT -----  Contact: self                            attn:  Tiffanie  Patient [phone] is returning call to Nurse Tiffanie concerning results/please call

## 2018-09-14 NOTE — TELEPHONE ENCOUNTER
Given message per Dr. Cade:  Let her know that her FNA was benign. Can followup in a year with a TSH, thyroid US    Reminder letter placed

## 2018-10-03 ENCOUNTER — TELEPHONE (OUTPATIENT)
Dept: FAMILY MEDICINE | Facility: CLINIC | Age: 68
End: 2018-10-03

## 2018-10-03 NOTE — TELEPHONE ENCOUNTER
----- Message from Tremayne Basilio sent at 10/3/2018  4:27 PM CDT -----  Contact: patient  Type: Needs Medical Advice    Who Called:  patient  Symptoms (please be specific):    How long has patient had these symptoms:    Pharmacy name and phone #:    Best Call Back Number: [phone]  Additional Information: requesting copy of labs from 08/11/18 be mailed to [address] [id number]

## 2018-10-04 ENCOUNTER — TELEPHONE (OUTPATIENT)
Dept: ENDOCRINOLOGY | Facility: CLINIC | Age: 68
End: 2018-10-04

## 2018-10-04 NOTE — TELEPHONE ENCOUNTER
----- Message from Tremayne Basilio sent at 10/3/2018  4:29 PM CDT -----  Contact: patient  Type: Needs Medical Advice    Who Called:  patient  Symptoms (please be specific):    How long has patient had these symptoms:    Pharmacy name and phone #:    Best Call Back Number: [phone]  Additional Information: requesting copy of biopsy report from 09/12/18 be mailed to [address] [id number]

## 2018-10-12 ENCOUNTER — TELEPHONE (OUTPATIENT)
Dept: FAMILY MEDICINE | Facility: CLINIC | Age: 68
End: 2018-10-12

## 2018-10-12 NOTE — TELEPHONE ENCOUNTER
----- Message from Marino Lanza sent at 10/11/2018  3:44 PM CDT -----  Contact: Pt  Name of Who is Calling: Opal      What is the request in detail: Patient is calling requesting to have results sent to her through the mail. Patient states she's sent over previous requests      Can the clinic reply by [facility]:       What Number to Call Back if not in [facility]: .[phone] (home)

## 2018-11-20 DIAGNOSIS — M25.561 CHRONIC PAIN OF RIGHT KNEE: Primary | ICD-10-CM

## 2018-11-20 DIAGNOSIS — G89.29 CHRONIC PAIN OF RIGHT KNEE: Primary | ICD-10-CM

## 2018-11-21 ENCOUNTER — OFFICE VISIT (OUTPATIENT)
Dept: ORTHOPEDICS | Facility: CLINIC | Age: 68
End: 2018-11-21
Payer: COMMERCIAL

## 2018-11-21 VITALS — BODY MASS INDEX: 23.3 KG/M2 | HEIGHT: 66 IN | WEIGHT: 145 LBS

## 2018-11-21 DIAGNOSIS — G89.29 CHRONIC PAIN OF RIGHT KNEE: Primary | ICD-10-CM

## 2018-11-21 DIAGNOSIS — M17.11 OSTEOARTHRITIS OF RIGHT KNEE, UNSPECIFIED OSTEOARTHRITIS TYPE: ICD-10-CM

## 2018-11-21 DIAGNOSIS — M25.561 ACUTE PAIN OF RIGHT KNEE: Primary | ICD-10-CM

## 2018-11-21 DIAGNOSIS — M25.561 CHRONIC PAIN OF RIGHT KNEE: Primary | ICD-10-CM

## 2018-11-21 PROCEDURE — 99999 PR PBB SHADOW E&M-EST. PATIENT-LVL II: CPT | Mod: PBBFAC,,, | Performed by: ORTHOPAEDIC SURGERY

## 2018-11-21 PROCEDURE — 1101F PT FALLS ASSESS-DOCD LE1/YR: CPT | Mod: CPTII,S$GLB,, | Performed by: ORTHOPAEDIC SURGERY

## 2018-11-21 PROCEDURE — 99214 OFFICE O/P EST MOD 30 MIN: CPT | Mod: 25,S$GLB,, | Performed by: ORTHOPAEDIC SURGERY

## 2018-11-21 RX ORDER — TRIAMCINOLONE ACETONIDE 40 MG/ML
40 INJECTION, SUSPENSION INTRA-ARTICULAR; INTRAMUSCULAR
Status: DISCONTINUED | OUTPATIENT
Start: 2018-11-21 | End: 2018-11-21 | Stop reason: HOSPADM

## 2018-11-21 RX ADMIN — TRIAMCINOLONE ACETONIDE 40 MG: 40 INJECTION, SUSPENSION INTRA-ARTICULAR; INTRAMUSCULAR at 11:11

## 2018-11-21 NOTE — PROGRESS NOTES
Adelina is seen here today in followup of her knee pain and arthritis.  She has   had viscosupplementation in the past as well as cortisone shots with limited   partial relief.  She is interested in further intervention.    Again, she has joint line tenderness without signs of infection.    X-rays show joint space narrowing.    ASSESSMENT:  Right knee arthrosis.    PLAN:  We will get her scheduled for a cortisone shot when she has time to rest   it and we will see her back at that time.      PBB/HN  dd: 11/21/2018 12:32:32 (CST)  td: 11/22/2018 06:52:48 (CST)  Doc ID   #[id number]  Job ID #[id number]    CC:     Further History  Aching pain  Worse with activity  Relieved with rest  No other associated symptoms  No other radiation    Further Exam  Alert and oriented  Pleasant  Contralateral limb has appropriate range of motion for age and condition  Contralateral limb has appropriate strength for age and condition  Contralateral limb has appropriate stability  for age and condition  No adenopathy  Pulses are appropriate for current condition  Skin is intact        Chief Complaint    Chief Complaint   Patient presents with    Right Knee - Pain, Swelling       HPI  Opal Sahu is a 68 y.o.  female who presents with       Past Medical History  Past Medical History:   Diagnosis Date    Hyperlipidemia     Hypothyroid        Past Surgical History  Past Surgical History:   Procedure Laterality Date    None         Medications  Current Outpatient Medications   Medication Sig    aspirin 81 mg Tab Every day    atorvastatin (LIPITOR) 10 MG tablet Take 1 tablet (10 mg total) by mouth once daily.    bimatoprost (LATISSE) 0.03 % ophthalmic solution Place one drop on applicator and apply evenly along the skin of the upper eyelid at base of eyelashes once daily at bedtime; repeat procedure for second eye (use a clean applicator).    mupirocin calcium 2% (BACTROBAN) 2 % cream Apply topically 2 (two) times daily. Apply to  woundTwice a day to the wound.    SYNTHROID 50 mcg tablet Take 1 tablet (50 mcg total) by mouth once daily.    SYNTHROID 50 mcg tablet TAKE 1 TABLET BY MOUTH EVERY DAY    valACYclovir (VALTREX) 1000 MG tablet Take 2 tablets (2,000 mg total) by mouth 2 (two) times daily.     No current facility-administered medications for this visit.        Allergies  Review of patient's allergies indicates:   Allergen Reactions    Povidone-iodine      Other reaction(s): Unknown       Family History  Family History   Problem Relation Age of Onset    Heart disease Mother     Cancer Father     Heart disease Brother     Breast cancer Other        Social History  Social History     Socioeconomic History    Marital status:      Spouse name: Not on file    Number of children: Not on file    Years of education: Not on file    Highest education level: Not on file   Social Needs    Financial resource strain: Not on file    Food insecurity - worry: Not on file    Food insecurity - inability: Not on file    Transportation needs - medical: Not on file    Transportation needs - non-medical: Not on file   Occupational History     Employer: [employer]   Tobacco Use    Smoking status: Never Smoker   Substance and Sexual Activity    Alcohol use: Yes    Drug use: No    Sexual activity: Not on file   Other Topics Concern    Not on file   Social History Narrative    Not on file               Review of Systems     Constitutional: Negative    HENT: Negative  Eyes: Negative  Respiratory: Negative  Cardiovascular: Negative  Musculoskeletal: HPI  Skin: Negative  Neurological: Negative  Hematological: Negative  Endocrine: Negative                 Physical Exam    There were no vitals filed for this visit.  Body mass index is 23.4 kg/m².  Physical Examination:     General appearance -  well appearing, and in no distress  Mental status - awake  Neck - supple  Chest -  symmetric air entry  Heart - normal rate    Abdomen - soft      Assessment     1. Chronic pain of right knee    2. Osteoarthritis of right knee, unspecified osteoarthritis type          Plan

## 2018-11-21 NOTE — PROCEDURES
Large Joint Aspiration/Injection: R knee  Date/Time: 11/21/2018 11:54 AM  Performed by: Jorge Montoya MD  Authorized by: Jorge Montoya MD     Consent Done?:  Yes (Verbal)  Indications:  Pain  Timeout: Prior to procedure the correct patient, procedure, and site was verified      Location:  Knee  Site:  R knee  Prep: Patient was prepped and draped in usual sterile fashion    Ultrasonic Guidance for needle placement: No  Needle size:  21 G  Approach:  Anterolateral  Medications:  40 mg triamcinolone acetonide 40 mg/mL  Patient tolerance:  Patient tolerated the procedure well with no immediate complications

## 2018-11-26 ENCOUNTER — OFFICE VISIT (OUTPATIENT)
Dept: ORTHOPEDICS | Facility: CLINIC | Age: 68
End: 2018-11-26
Payer: COMMERCIAL

## 2018-11-26 VITALS — BODY MASS INDEX: 23.3 KG/M2 | HEIGHT: 66 IN | WEIGHT: 145 LBS

## 2018-11-26 DIAGNOSIS — M25.562 ACUTE PAIN OF LEFT KNEE: ICD-10-CM

## 2018-11-26 DIAGNOSIS — M25.561 ACUTE PAIN OF RIGHT KNEE: Primary | ICD-10-CM

## 2018-11-26 DIAGNOSIS — M17.11 OSTEOARTHRITIS OF RIGHT KNEE, UNSPECIFIED OSTEOARTHRITIS TYPE: ICD-10-CM

## 2018-11-26 DIAGNOSIS — Z71.2 ENCOUNTER TO DISCUSS TEST RESULTS: ICD-10-CM

## 2018-11-26 PROCEDURE — 1101F PT FALLS ASSESS-DOCD LE1/YR: CPT | Mod: CPTII,S$GLB,, | Performed by: ORTHOPAEDIC SURGERY

## 2018-11-26 PROCEDURE — 99214 OFFICE O/P EST MOD 30 MIN: CPT | Mod: 25,S$GLB,, | Performed by: ORTHOPAEDIC SURGERY

## 2018-11-26 PROCEDURE — 99999 PR PBB SHADOW E&M-EST. PATIENT-LVL II: CPT | Mod: PBBFAC,,, | Performed by: ORTHOPAEDIC SURGERY

## 2018-11-26 PROCEDURE — 20610 DRAIN/INJ JOINT/BURSA W/O US: CPT | Mod: RT,S$GLB,, | Performed by: ORTHOPAEDIC SURGERY

## 2018-11-26 RX ADMIN — TRIAMCINOLONE ACETONIDE 40 MG: 40 INJECTION, SUSPENSION INTRA-ARTICULAR; INTRAMUSCULAR at 02:11

## 2018-11-26 NOTE — PROCEDURES
Large Joint Aspiration/Injection: R knee  Date/Time: 11/26/2018 2:56 PM  Performed by: Jorge Montoya MD  Authorized by: Jorge Montoya MD     Consent Done?:  Yes (Verbal)  Indications:  Pain  Timeout: Prior to procedure the correct patient, procedure, and site was verified      Location:  Knee  Site:  R knee  Prep: Patient was prepped and draped in usual sterile fashion    Ultrasonic Guidance for needle placement: No  Needle size:  21 G  Approach:  Anterolateral  Medications:  40 mg triamcinolone acetonide 40 mg/mL  Patient tolerance:  Patient tolerated the procedure well with no immediate complications

## 2018-11-26 NOTE — PROGRESS NOTES
Dictation #1  MRN:[id number]  CSN:[id number]  Further History  Aching pain  Worse with activity  Relieved with rest  No other associated symptoms  No other radiation    Further Exam  Alert and oriented  Pleasant  Contralateral limb has appropriate range of motion for age and condition  Contralateral limb has appropriate strength for age and condition  Contralateral limb has appropriate stability  for age and condition  No adenopathy  Pulses are appropriate for current condition  Skin is intact        Chief Complaint    Chief Complaint   Patient presents with    Right Knee - Pain    Left Knee - Pain    Results     mri results right knee        HPI  Opal Sahu is a 68 y.o.  female who presents with       Past Medical History  Past Medical History:   Diagnosis Date    Hyperlipidemia     Hypothyroid        Past Surgical History  Past Surgical History:   Procedure Laterality Date    None         Medications  Current Outpatient Medications   Medication Sig    aspirin 81 mg Tab Every day    atorvastatin (LIPITOR) 10 MG tablet Take 1 tablet (10 mg total) by mouth once daily.    bimatoprost (LATISSE) 0.03 % ophthalmic solution Place one drop on applicator and apply evenly along the skin of the upper eyelid at base of eyelashes once daily at bedtime; repeat procedure for second eye (use a clean applicator).    mupirocin calcium 2% (BACTROBAN) 2 % cream Apply topically 2 (two) times daily. Apply to woundTwice a day to the wound.    SYNTHROID 50 mcg tablet Take 1 tablet (50 mcg total) by mouth once daily.    SYNTHROID 50 mcg tablet TAKE 1 TABLET BY MOUTH EVERY DAY    valACYclovir (VALTREX) 1000 MG tablet Take 2 tablets (2,000 mg total) by mouth 2 (two) times daily.     No current facility-administered medications for this visit.        Allergies  Review of patient's allergies indicates:   Allergen Reactions    Povidone-iodine      Other reaction(s): Unknown       Family History  Family History   Problem  Relation Age of Onset    Heart disease Mother     Cancer Father     Heart disease Brother     Breast cancer Other        Social History  Social History     Socioeconomic History    Marital status:      Spouse name: Not on file    Number of children: Not on file    Years of education: Not on file    Highest education level: Not on file   Social Needs    Financial resource strain: Not on file    Food insecurity - worry: Not on file    Food insecurity - inability: Not on file    Transportation needs - medical: Not on file    Transportation needs - non-medical: Not on file   Occupational History     Employer: [employer]   Tobacco Use    Smoking status: Never Smoker   Substance and Sexual Activity    Alcohol use: Yes    Drug use: No    Sexual activity: Not on file   Other Topics Concern    Not on file   Social History Narrative    Not on file               Review of Systems     Constitutional: Negative    HENT: Negative  Eyes: Negative  Respiratory: Negative  Cardiovascular: Negative  Musculoskeletal: HPI  Skin: Negative  Neurological: Negative  Hematological: Negative  Endocrine: Negative                 Physical Exam    There were no vitals filed for this visit.  Body mass index is 23.4 kg/m².  Physical Examination:     General appearance -  well appearing, and in no distress  Mental status - awake  Neck - supple  Chest -  symmetric air entry  Heart - normal rate   Abdomen - soft      Assessment     1. Acute pain of right knee    2. Osteoarthritis of right knee, unspecified osteoarthritis type    3. Encounter to discuss test results    4. Acute pain of left knee          Plan

## 2018-11-28 RX ORDER — TRIAMCINOLONE ACETONIDE 40 MG/ML
40 INJECTION, SUSPENSION INTRA-ARTICULAR; INTRAMUSCULAR
Status: DISCONTINUED | OUTPATIENT
Start: 2018-11-26 | End: 2018-11-28 | Stop reason: HOSPADM

## 2018-12-24 ENCOUNTER — TELEPHONE (OUTPATIENT)
Dept: ORTHOPEDICS | Facility: CLINIC | Age: 68
End: 2018-12-24

## 2018-12-24 NOTE — TELEPHONE ENCOUNTER
----- Message from Lulu Gan sent at 12/24/2018  8:08 AM CST -----  Contact: Patient  Patient is calling to see if the doctor would order her an MRI for her left knee to have it done before the end of the year.   And she also like to get a cortisone shot in her left knee.  Please give her a call to let her know.   Call Back#[phone]  Thanks

## 2018-12-24 NOTE — TELEPHONE ENCOUNTER
Spoke to patient. She would like a MRI of her left knee done before the end of the year. If she can not have the MRI done she would like a cortisone injection. Please call patient.

## 2018-12-26 ENCOUNTER — TELEPHONE (OUTPATIENT)
Dept: ORTHOPEDICS | Facility: CLINIC | Age: 68
End: 2018-12-26

## 2018-12-26 DIAGNOSIS — M25.562 ACUTE PAIN OF LEFT KNEE: Primary | ICD-10-CM

## 2018-12-26 DIAGNOSIS — M17.12 PRIMARY OSTEOARTHRITIS OF LEFT KNEE: ICD-10-CM

## 2018-12-26 NOTE — TELEPHONE ENCOUNTER
Called patient and left voicemail to get patient an appointment to come in and discuss her left knee pain. Left call back number for patient.

## 2018-12-26 NOTE — TELEPHONE ENCOUNTER
Patient is requesting a left knee mri do to the pain not getting any better and having the same pain on the right knee. Patient is going to come by and  prescription for MRI and take it to [facility] to get MRI. Patient will call when she has MRI scheduled and she is ready to come in for MRI Results.

## 2018-12-27 ENCOUNTER — TELEPHONE (OUTPATIENT)
Dept: ORTHOPEDICS | Facility: CLINIC | Age: 68
End: 2018-12-27

## 2018-12-27 NOTE — TELEPHONE ENCOUNTER
Appt. Scheduled 1/2/19 for MRI results of left knee per patient request    ----- Message from Nitesh Diaz sent at 12/27/2018  3:50 PM CST -----  Contact: pt  The Pt is requesting a call back regarding the MRI that was done today and if an appointment is needed based on the reading. Please call to advise.     Call Back#: [phone]  Thanks

## 2018-12-31 ENCOUNTER — TELEPHONE (OUTPATIENT)
Dept: ORTHOPEDICS | Facility: CLINIC | Age: 68
End: 2018-12-31

## 2018-12-31 NOTE — TELEPHONE ENCOUNTER
Informed patient our first available appt is 1/2/19, patient has existing on 1/2/19. Informed pt if we have any cancellation today I will call, if not we will see her on 1/2/19. Understanding and thanks verbalized    ----- Message from Nidia Peterson sent at 12/31/2018  8:42 AM CST -----  Contact: Opal  Type: Needs Medical Advice    Who Called:  Patient   Best Call Back Number: [phone]  Additional Information:  Calling as left knee is painful. Scheduled for 1/2/19 but asking to be seen today. Thanks!

## 2019-01-02 ENCOUNTER — OFFICE VISIT (OUTPATIENT)
Dept: ORTHOPEDICS | Facility: CLINIC | Age: 69
End: 2019-01-02
Payer: COMMERCIAL

## 2019-01-02 VITALS — HEIGHT: 66 IN | WEIGHT: 145 LBS | BODY MASS INDEX: 23.3 KG/M2

## 2019-01-02 DIAGNOSIS — M17.12 PRIMARY OSTEOARTHRITIS OF LEFT KNEE: ICD-10-CM

## 2019-01-02 DIAGNOSIS — M25.562 ACUTE PAIN OF LEFT KNEE: Primary | ICD-10-CM

## 2019-01-02 PROCEDURE — 99999 PR PBB SHADOW E&M-EST. PATIENT-LVL II: CPT | Mod: PBBFAC,,, | Performed by: ORTHOPAEDIC SURGERY

## 2019-01-02 PROCEDURE — 99214 OFFICE O/P EST MOD 30 MIN: CPT | Mod: 25,S$GLB,, | Performed by: ORTHOPAEDIC SURGERY

## 2019-01-02 PROCEDURE — 20610 DRAIN/INJ JOINT/BURSA W/O US: CPT | Mod: LT,S$GLB,, | Performed by: ORTHOPAEDIC SURGERY

## 2019-01-02 PROCEDURE — 20610 LARGE JOINT ASPIRATION/INJECTION: L KNEE: ICD-10-PCS | Mod: LT,S$GLB,, | Performed by: ORTHOPAEDIC SURGERY

## 2019-01-02 PROCEDURE — 1101F PR PT FALLS ASSESS DOC 0-1 FALLS W/OUT INJ PAST YR: ICD-10-PCS | Mod: CPTII,S$GLB,, | Performed by: ORTHOPAEDIC SURGERY

## 2019-01-02 PROCEDURE — 99999 PR PBB SHADOW E&M-EST. PATIENT-LVL II: ICD-10-PCS | Mod: PBBFAC,,, | Performed by: ORTHOPAEDIC SURGERY

## 2019-01-02 PROCEDURE — 99214 PR OFFICE/OUTPT VISIT, EST, LEVL IV, 30-39 MIN: ICD-10-PCS | Mod: 25,S$GLB,, | Performed by: ORTHOPAEDIC SURGERY

## 2019-01-02 PROCEDURE — 1101F PT FALLS ASSESS-DOCD LE1/YR: CPT | Mod: CPTII,S$GLB,, | Performed by: ORTHOPAEDIC SURGERY

## 2019-01-02 RX ORDER — TRIAMCINOLONE ACETONIDE 40 MG/ML
40 INJECTION, SUSPENSION INTRA-ARTICULAR; INTRAMUSCULAR
Status: DISCONTINUED | OUTPATIENT
Start: 2019-01-02 | End: 2019-01-02 | Stop reason: HOSPADM

## 2019-01-02 RX ADMIN — TRIAMCINOLONE ACETONIDE 40 MG: 40 INJECTION, SUSPENSION INTRA-ARTICULAR; INTRAMUSCULAR at 09:01

## 2019-01-02 NOTE — PROCEDURES
Large Joint Aspiration/Injection: L knee  Date/Time: 1/2/2019 9:15 AM  Performed by: Jorge Montoya MD  Authorized by: Jorge Montoya MD     Consent Done?:  Yes (Verbal)  Indications:  Pain  Procedure site marked: Yes    Timeout: Prior to procedure the correct patient, procedure, and site was verified      Location:  Knee  Site:  L knee  Prep: Patient was prepped and draped in usual sterile fashion    Ultrasonic Guidance for needle placement: No  Needle size:  21 G  Approach:  Anterolateral  Medications:  40 mg triamcinolone acetonide 40 mg/mL  Patient tolerance:  Patient tolerated the procedure well with no immediate complications

## 2019-01-02 NOTE — PROGRESS NOTES
68 years old, pain in the left knee, recently had an MRI of her left knee, which   showed degenerative type changes including degenerative tear of the medial   meniscus.  At this point, she responded well.  She had a cortisone shot in her   right knee, we are requesting that to her left knee.  We did that today.    Kenalog injection to the left knee.  Encouraged continued strengthening   exercises.  We will see her back as needed.      PBB/HN  dd: 01/02/2019 09:35:54 (CST)  td: 01/02/2019 22:23:01 (CST)  Doc ID   #[id number]  Job ID #[id number]    CC:     Further History  Aching pain  Worse with activity  Relieved with rest  No other associated symptoms  No other radiation    Further Exam  Alert and oriented  Pleasant  Contralateral limb has appropriate range of motion for age and condition  Contralateral limb has appropriate strength for age and condition  Contralateral limb has appropriate stability  for age and condition  No adenopathy  Pulses are appropriate for current condition  Skin is intact        Chief Complaint    Chief Complaint   Patient presents with    Left Knee - Pain, Swelling       HPI  Opal Sahu is a 68 y.o.  female who presents with       Past Medical History  Past Medical History:   Diagnosis Date    Hyperlipidemia     Hypothyroid        Past Surgical History  Past Surgical History:   Procedure Laterality Date    None         Medications  Current Outpatient Medications   Medication Sig    aspirin 81 mg Tab Every day    atorvastatin (LIPITOR) 10 MG tablet Take 1 tablet (10 mg total) by mouth once daily.    bimatoprost (LATISSE) 0.03 % ophthalmic solution Place one drop on applicator and apply evenly along the skin of the upper eyelid at base of eyelashes once daily at bedtime; repeat procedure for second eye (use a clean applicator).    mupirocin calcium 2% (BACTROBAN) 2 % cream Apply topically 2 (two) times daily. Apply to woundTwice a day to the wound.    SYNTHROID 50 mcg tablet Take  1 tablet (50 mcg total) by mouth once daily.    SYNTHROID 50 mcg tablet TAKE 1 TABLET BY MOUTH EVERY DAY    valACYclovir (VALTREX) 1000 MG tablet Take 2 tablets (2,000 mg total) by mouth 2 (two) times daily.     No current facility-administered medications for this visit.        Allergies  Review of patient's allergies indicates:   Allergen Reactions    Povidone-iodine      Other reaction(s): Unknown       Family History  Family History   Problem Relation Age of Onset    Heart disease Mother     Cancer Father     Heart disease Brother     Breast cancer Other        Social History  Social History     Socioeconomic History    Marital status:      Spouse name: Not on file    Number of children: Not on file    Years of education: Not on file    Highest education level: Not on file   Social Needs    Financial resource strain: Not on file    Food insecurity - worry: Not on file    Food insecurity - inability: Not on file    Transportation needs - medical: Not on file    Transportation needs - non-medical: Not on file   Occupational History     Employer: [employer]   Tobacco Use    Smoking status: Never Smoker   Substance and Sexual Activity    Alcohol use: Yes    Drug use: No    Sexual activity: Not on file   Other Topics Concern    Not on file   Social History Narrative    Not on file               Review of Systems     Constitutional: Negative    HENT: Negative  Eyes: Negative  Respiratory: Negative  Cardiovascular: Negative  Musculoskeletal: HPI  Skin: Negative  Neurological: Negative  Hematological: Negative  Endocrine: Negative                 Physical Exam    There were no vitals filed for this visit.  Body mass index is 23.4 kg/m².  Physical Examination:     General appearance -  well appearing, and in no distress  Mental status - awake  Neck - supple  Chest -  symmetric air entry  Heart - normal rate   Abdomen - soft      Assessment     1. Acute pain of left knee    2.  Primary osteoarthritis of left knee          Plan

## 2019-01-04 ENCOUNTER — TELEPHONE (OUTPATIENT)
Dept: ORTHOPEDICS | Facility: CLINIC | Age: 69
End: 2019-01-04

## 2019-01-04 NOTE — TELEPHONE ENCOUNTER
Spoke to patient, reassured her that cortisone injection can take up to 3-4 weeks to feel full effect. Advised patient to continue to rest, elevate, ice and ibuprofen or tylenol. Advised patient to call back with any questions/concerns. Patient stated thanks, and understanding!       ----- Message from Ara Pennington sent at 1/4/2019  9:37 AM CST -----  Type: Needs Medical Advice    Who Called: Patient  Symptoms (please be specific):  Left knee pain  How long has patient had these symptoms:  ongoing  Pharmacy name and phone #:  [facility]  Best Call Back Number: [phone] (home)     Additional Information: Stating she is still in pain for left knee, please contact to discuss

## 2019-01-23 ENCOUNTER — TELEPHONE (OUTPATIENT)
Dept: ORTHOPEDICS | Facility: CLINIC | Age: 69
End: 2019-01-23

## 2019-01-23 NOTE — TELEPHONE ENCOUNTER
appt scheduled 1/24/19 per patient request    ----- Message from Tremayne Basilio sent at 1/23/2019  1:48 PM CST -----  Contact: patient  Type: Needs Medical Advice    Who Called:  Patient  Symptoms (please be specific):    How long has patient had these symptoms:    Pharmacy name and phone #:    Best Call Back Number: [phone]  Additional Information:patient requesting  A call back stated that she still is having trouble with left knee

## 2019-01-24 ENCOUNTER — OFFICE VISIT (OUTPATIENT)
Dept: ORTHOPEDICS | Facility: CLINIC | Age: 69
End: 2019-01-24
Payer: COMMERCIAL

## 2019-01-24 VITALS — BODY MASS INDEX: 23.3 KG/M2 | WEIGHT: 145 LBS | HEIGHT: 66 IN

## 2019-01-24 DIAGNOSIS — M25.561 CHRONIC PAIN OF BOTH KNEES: ICD-10-CM

## 2019-01-24 DIAGNOSIS — M17.11 PRIMARY OSTEOARTHRITIS OF RIGHT KNEE: ICD-10-CM

## 2019-01-24 DIAGNOSIS — G89.29 CHRONIC PAIN OF BOTH KNEES: ICD-10-CM

## 2019-01-24 DIAGNOSIS — M25.562 CHRONIC PAIN OF BOTH KNEES: ICD-10-CM

## 2019-01-24 DIAGNOSIS — M17.12 PRIMARY OSTEOARTHRITIS OF LEFT KNEE: Primary | ICD-10-CM

## 2019-01-24 PROCEDURE — 99214 PR OFFICE/OUTPT VISIT, EST, LEVL IV, 30-39 MIN: ICD-10-PCS | Mod: 25,S$GLB,, | Performed by: ORTHOPAEDIC SURGERY

## 2019-01-24 PROCEDURE — 99999 PR PBB SHADOW E&M-EST. PATIENT-LVL II: CPT | Mod: PBBFAC,,, | Performed by: ORTHOPAEDIC SURGERY

## 2019-01-24 PROCEDURE — 1101F PR PT FALLS ASSESS DOC 0-1 FALLS W/OUT INJ PAST YR: ICD-10-PCS | Mod: CPTII,S$GLB,, | Performed by: ORTHOPAEDIC SURGERY

## 2019-01-24 PROCEDURE — 20610 PR DRAIN/INJECT LARGE JOINT/BURSA: ICD-10-PCS | Mod: LT,S$GLB,, | Performed by: ORTHOPAEDIC SURGERY

## 2019-01-24 PROCEDURE — 99214 OFFICE O/P EST MOD 30 MIN: CPT | Mod: 25,S$GLB,, | Performed by: ORTHOPAEDIC SURGERY

## 2019-01-24 PROCEDURE — 1101F PT FALLS ASSESS-DOCD LE1/YR: CPT | Mod: CPTII,S$GLB,, | Performed by: ORTHOPAEDIC SURGERY

## 2019-01-24 PROCEDURE — 20610 DRAIN/INJ JOINT/BURSA W/O US: CPT | Mod: LT,S$GLB,, | Performed by: ORTHOPAEDIC SURGERY

## 2019-01-24 PROCEDURE — 99999 PR PBB SHADOW E&M-EST. PATIENT-LVL II: ICD-10-PCS | Mod: PBBFAC,,, | Performed by: ORTHOPAEDIC SURGERY

## 2019-01-24 NOTE — PROGRESS NOTES
A 68 year old, recurrent left knee pain, injection helped for about a week, the   Kenalog injection that we gave her a few weeks ago.  She is having persistent   pain in the knee.  She is requesting further intervention.  She has degenerative   changes in the knee as seen on MRI with degenerative meniscal tear.  At this   point, we are going to see if we get her scheduled for bilateral Euflexxa   injections, which she has had back in July 2017, responded favorably, requesting   this done today in both of her knees.    PLAN:  We will do that once we get it approved.      PBB/IN  dd: 01/24/2019 16:33:46 (CST)  td: 01/24/2019 17:01:40 (CST)  Doc ID   #[id number]  Job ID #[id number]    CC:     Further History  Aching pain  Worse with activity  Relieved with rest  No other associated symptoms  No other radiation    Further Exam  Alert and oriented  Pleasant  Contralateral limb has appropriate range of motion for age and condition  Contralateral limb has appropriate strength for age and condition  Contralateral limb has appropriate stability  for age and condition  No adenopathy  Pulses are appropriate for current condition  Skin is intact        Chief Complaint    Chief Complaint   Patient presents with    Left Knee - Pain       HPI  Opal Sahu is a 68 y.o.  female who presents with       Past Medical History  Past Medical History:   Diagnosis Date    Hyperlipidemia     Hypothyroid        Past Surgical History  Past Surgical History:   Procedure Laterality Date    None         Medications  Current Outpatient Medications   Medication Sig    aspirin 81 mg Tab Every day    atorvastatin (LIPITOR) 10 MG tablet Take 1 tablet (10 mg total) by mouth once daily.    bimatoprost (LATISSE) 0.03 % ophthalmic solution Place one drop on applicator and apply evenly along the skin of the upper eyelid at base of eyelashes once daily at bedtime; repeat procedure for second eye (use a clean applicator).    mupirocin calcium 2%  (BACTROBAN) 2 % cream Apply topically 2 (two) times daily. Apply to woundTwice a day to the wound.    SYNTHROID 50 mcg tablet Take 1 tablet (50 mcg total) by mouth once daily.    SYNTHROID 50 mcg tablet TAKE 1 TABLET BY MOUTH EVERY DAY    valACYclovir (VALTREX) 1000 MG tablet Take 2 tablets (2,000 mg total) by mouth 2 (two) times daily.     No current facility-administered medications for this visit.        Allergies  Review of patient's allergies indicates:   Allergen Reactions    Povidone-iodine      Other reaction(s): Unknown       Family History  Family History   Problem Relation Age of Onset    Heart disease Mother     Cancer Father     Heart disease Brother     Breast cancer Other        Social History  Social History     Socioeconomic History    Marital status:      Spouse name: Not on file    Number of children: Not on file    Years of education: Not on file    Highest education level: Not on file   Social Needs    Financial resource strain: Not on file    Food insecurity - worry: Not on file    Food insecurity - inability: Not on file    Transportation needs - medical: Not on file    Transportation needs - non-medical: Not on file   Occupational History     Employer: [employer]   Tobacco Use    Smoking status: Never Smoker   Substance and Sexual Activity    Alcohol use: Yes    Drug use: No    Sexual activity: Not on file   Other Topics Concern    Not on file   Social History Narrative    Not on file               Review of Systems     Constitutional: Negative    HENT: Negative  Eyes: Negative  Respiratory: Negative  Cardiovascular: Negative  Musculoskeletal: HPI  Skin: Negative  Neurological: Negative  Hematological: Negative  Endocrine: Negative                 Physical Exam    There were no vitals filed for this visit.  Body mass index is 23.4 kg/m².  Physical Examination:     General appearance -  well appearing, and in no distress  Mental status - awake  Neck  - supple  Chest -  symmetric air entry  Heart - normal rate   Abdomen - soft      Assessment     1. Acute pain of left knee    2. Primary osteoarthritis of left knee          Plan

## 2019-01-24 NOTE — PROCEDURES
Large Joint Aspiration/Injection: L knee  Date/Time: 1/24/2019 4:06 PM  Performed by: oJrge Montoya MD  Authorized by: Jorge Montoya MD     Consent Done?:  Yes (Verbal)  Indications:  Pain  Procedure site marked: Yes    Timeout: Prior to procedure the correct patient, procedure, and site was verified      Location:  Knee  Site:  L knee  Prep: Patient was prepped and draped in usual sterile fashion    Ultrasonic Guidance for needle placement: No  Needle size:  21 G  Approach:  Anterolateral  Medications:  20 mg sodium hyaluronate (EUFLEXXA) 10 mg/mL(mw 2.4 -3.6 million)  Patient tolerance:  Patient tolerated the procedure well with no immediate complications

## 2019-01-28 ENCOUNTER — OFFICE VISIT (OUTPATIENT)
Dept: ORTHOPEDICS | Facility: CLINIC | Age: 69
End: 2019-01-28
Payer: COMMERCIAL

## 2019-01-28 VITALS — HEIGHT: 66 IN | WEIGHT: 145 LBS | BODY MASS INDEX: 23.3 KG/M2

## 2019-01-28 DIAGNOSIS — G89.29 CHRONIC PAIN OF BOTH KNEES: Primary | ICD-10-CM

## 2019-01-28 DIAGNOSIS — M25.562 CHRONIC PAIN OF BOTH KNEES: Primary | ICD-10-CM

## 2019-01-28 DIAGNOSIS — M17.12 PRIMARY OSTEOARTHRITIS OF LEFT KNEE: ICD-10-CM

## 2019-01-28 DIAGNOSIS — M17.11 PRIMARY OSTEOARTHRITIS OF RIGHT KNEE: ICD-10-CM

## 2019-01-28 DIAGNOSIS — M25.561 CHRONIC PAIN OF BOTH KNEES: Primary | ICD-10-CM

## 2019-01-28 PROCEDURE — 20610 DRAIN/INJ JOINT/BURSA W/O US: CPT | Mod: 50,S$GLB,, | Performed by: ORTHOPAEDIC SURGERY

## 2019-01-28 PROCEDURE — 99999 PR PBB SHADOW E&M-EST. PATIENT-LVL II: ICD-10-PCS | Mod: PBBFAC,,, | Performed by: ORTHOPAEDIC SURGERY

## 2019-01-28 PROCEDURE — 99499 NO LOS: ICD-10-PCS | Mod: S$GLB,,, | Performed by: ORTHOPAEDIC SURGERY

## 2019-01-28 PROCEDURE — 99999 PR PBB SHADOW E&M-EST. PATIENT-LVL II: CPT | Mod: PBBFAC,,, | Performed by: ORTHOPAEDIC SURGERY

## 2019-01-28 PROCEDURE — 20610 LARGE JOINT ASPIRATION/INJECTION: R KNEE, L KNEE: ICD-10-PCS | Mod: 50,S$GLB,, | Performed by: ORTHOPAEDIC SURGERY

## 2019-01-28 PROCEDURE — 99499 UNLISTED E&M SERVICE: CPT | Mod: S$GLB,,, | Performed by: ORTHOPAEDIC SURGERY

## 2019-01-28 NOTE — PROCEDURES
Large Joint Aspiration/Injection: R knee, L knee  Date/Time: 1/28/2019 2:38 PM  Performed by: Jorge Montoya MD  Authorized by: Jorge Montoya MD     Consent Done?:  Yes (Verbal)  Indications:  Pain  Procedure site marked: Yes      Location:  Knee  Site:  R knee and L knee  Ultrasonic Guidance for needle placement: No  Needle size:  22 G  Approach:  Anterolateral  Medications:  20 mg sodium hyaluronate (EUFLEXXA) 10 mg/mL(mw 2.4 -3.6 million); 20 mg sodium hyaluronate (EUFLEXXA) 10 mg/mL(mw 2.4 -3.6 million)  Patient tolerance:  Patient tolerated the procedure well with no immediate complications

## 2019-02-04 ENCOUNTER — OFFICE VISIT (OUTPATIENT)
Dept: ORTHOPEDICS | Facility: CLINIC | Age: 69
End: 2019-02-04
Payer: COMMERCIAL

## 2019-02-04 VITALS — BODY MASS INDEX: 23.3 KG/M2 | WEIGHT: 145 LBS | HEIGHT: 66 IN

## 2019-02-04 DIAGNOSIS — M17.0 OSTEOARTHRITIS OF BOTH KNEES, UNSPECIFIED OSTEOARTHRITIS TYPE: Primary | ICD-10-CM

## 2019-02-04 DIAGNOSIS — M17.11 PRIMARY OSTEOARTHRITIS OF RIGHT KNEE: ICD-10-CM

## 2019-02-04 PROCEDURE — 99499 NO LOS: ICD-10-PCS | Mod: S$GLB,,, | Performed by: ORTHOPAEDIC SURGERY

## 2019-02-04 PROCEDURE — 20610 LARGE JOINT ASPIRATION/INJECTION: R KNEE, L KNEE: ICD-10-PCS | Mod: 50,S$GLB,, | Performed by: ORTHOPAEDIC SURGERY

## 2019-02-04 PROCEDURE — 20610 DRAIN/INJ JOINT/BURSA W/O US: CPT | Mod: 50,S$GLB,, | Performed by: ORTHOPAEDIC SURGERY

## 2019-02-04 PROCEDURE — 99499 UNLISTED E&M SERVICE: CPT | Mod: S$GLB,,, | Performed by: ORTHOPAEDIC SURGERY

## 2019-02-04 NOTE — PROCEDURES
Large Joint Aspiration/Injection: R knee, L knee  Date/Time: 2/4/2019 2:49 PM  Performed by: Jorge Montoya MD  Authorized by: Jorge Montoya MD     Consent Done?:  Yes (Verbal)  Indications:  Pain  Procedure site marked: Yes      Location:  Knee  Site:  R knee and L knee  Ultrasonic Guidance for needle placement: No  Needle size:  22 G  Approach:  Anterolateral  Medications:  20 mg sodium hyaluronate (EUFLEXXA) 10 mg/mL(mw 2.4 -3.6 million); 20 mg sodium hyaluronate (EUFLEXXA) 10 mg/mL(mw 2.4 -3.6 million)  Patient tolerance:  Patient tolerated the procedure well with no immediate complications

## 2019-02-14 ENCOUNTER — OFFICE VISIT (OUTPATIENT)
Dept: ORTHOPEDICS | Facility: CLINIC | Age: 69
End: 2019-02-14
Payer: COMMERCIAL

## 2019-02-14 VITALS — WEIGHT: 145 LBS | HEIGHT: 66 IN | BODY MASS INDEX: 23.3 KG/M2

## 2019-02-14 DIAGNOSIS — M17.11 PRIMARY OSTEOARTHRITIS OF RIGHT KNEE: Primary | ICD-10-CM

## 2019-02-14 DIAGNOSIS — M17.0 OSTEOARTHRITIS OF BOTH KNEES, UNSPECIFIED OSTEOARTHRITIS TYPE: ICD-10-CM

## 2019-02-14 DIAGNOSIS — M25.562 CHRONIC PAIN OF BOTH KNEES: ICD-10-CM

## 2019-02-14 DIAGNOSIS — M25.561 CHRONIC PAIN OF BOTH KNEES: ICD-10-CM

## 2019-02-14 DIAGNOSIS — G89.29 CHRONIC PAIN OF BOTH KNEES: ICD-10-CM

## 2019-02-14 PROCEDURE — 20610 LARGE JOINT ASPIRATION/INJECTION: R KNEE, L KNEE: ICD-10-PCS | Mod: 50,S$GLB,, | Performed by: ORTHOPAEDIC SURGERY

## 2019-02-14 PROCEDURE — 99999 PR PBB SHADOW E&M-EST. PATIENT-LVL III: CPT | Mod: PBBFAC,,, | Performed by: ORTHOPAEDIC SURGERY

## 2019-02-14 PROCEDURE — 99499 NO LOS: ICD-10-PCS | Mod: S$GLB,,, | Performed by: ORTHOPAEDIC SURGERY

## 2019-02-14 PROCEDURE — 99499 UNLISTED E&M SERVICE: CPT | Mod: S$GLB,,, | Performed by: ORTHOPAEDIC SURGERY

## 2019-02-14 PROCEDURE — 20610 DRAIN/INJ JOINT/BURSA W/O US: CPT | Mod: 50,S$GLB,, | Performed by: ORTHOPAEDIC SURGERY

## 2019-02-14 PROCEDURE — 99999 PR PBB SHADOW E&M-EST. PATIENT-LVL III: ICD-10-PCS | Mod: PBBFAC,,, | Performed by: ORTHOPAEDIC SURGERY

## 2019-02-14 NOTE — PROCEDURES
Large Joint Aspiration/Injection: R knee, L knee  Date/Time: 2/14/2019 4:12 PM  Performed by: Jorge Montoya MD  Authorized by: Jorge Montoya MD     Consent Done?:  Yes (Verbal)  Indications:  Pain  Procedure site marked: Yes      Location:  Knee  Site:  R knee and L knee  Ultrasonic Guidance for needle placement: No  Needle size:  22 G  Approach:  Anterolateral  Medications:  20 mg sodium hyaluronate (EUFLEXXA) 10 mg/mL(mw 2.4 -3.6 million); 20 mg sodium hyaluronate (EUFLEXXA) 10 mg/mL(mw 2.4 -3.6 million)  Patient tolerance:  Patient tolerated the procedure well with no immediate complications

## 2019-04-17 ENCOUNTER — TELEPHONE (OUTPATIENT)
Dept: ORTHOPEDICS | Facility: CLINIC | Age: 69
End: 2019-04-17

## 2019-04-17 NOTE — TELEPHONE ENCOUNTER
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

## 2019-06-05 ENCOUNTER — TELEPHONE (OUTPATIENT)
Dept: FAMILY MEDICINE | Facility: CLINIC | Age: 69
End: 2019-06-05

## 2019-06-05 DIAGNOSIS — E78.00 HYPERCHOLESTEREMIA: ICD-10-CM

## 2019-06-05 DIAGNOSIS — Z12.39 SCREENING FOR BREAST CANCER: Primary | ICD-10-CM

## 2019-06-27 DIAGNOSIS — E78.00 HYPERCHOLESTEREMIA: ICD-10-CM

## 2019-06-27 DIAGNOSIS — E03.4 HYPOTHYROIDISM DUE TO ACQUIRED ATROPHY OF THYROID: Primary | Chronic | ICD-10-CM

## 2019-06-27 NOTE — PROGRESS NOTES
Refill Authorization Note     is requesting a refill authorization.    Brief assessment and rationale for refill: APPROVE: Needs Labs  Name and strength of medication: atorvastatin (LIPITOR) 10 MG tablet/SYNTHROID 50 mcg tablet  Medication-related problems identified:   Requires labs  Therapeutic duplication    Medication Therapy Plan: Lipids WNL 8/18; TSH WNL 8/18; LOV 5/18; FOV 10/19-earliest available; NTBS (CMP/Lipid/TSH); changed pharmacy per pt message; approve 3 more on each    Medication reconciliation completed: No              How patient will take medication: utd          Comments:   Last Lipids (12 months)  Lab Results   Component Value Date    CHOL 200 (H) 08/11/2018    CHOL 199 08/04/2017    CHOL 222 (H) 07/22/2016    Lab Results   Component Value Date    HDL 74 08/11/2018    HDL 64 08/04/2017    HDL 70 07/22/2016      Lab Results   Component Value Date    TRIG 84 08/11/2018    TRIG 101 08/04/2017    TRIG 100 07/22/2016    Lab Results   Component Value Date    LDLCALC 109.2 08/11/2018    LDLCALC 114.8 08/04/2017    LDLCALC 132.0 07/22/2016       Lab Results   Component Value Date    CHOLHDL 37.0 08/11/2018    CHOLHDL 32.2 08/04/2017    CHOLHDL 31.5 07/22/2016        Last LFTs (12 months)   Lab Results   Component Value Date    ALT 16 08/11/2018    ALT 14 08/04/2017    ALT 16 07/22/2016    Lab Results   Component Value Date    AST 14 08/11/2018    AST 13 08/04/2017    AST 17 07/22/2016      Lab Results   Component Value Date    BILITOT 1.5 (H) 08/11/2018    BILITOT 1.8 (H) 08/04/2017    BILITOT 1.9 (H) 07/22/2016    LFT/TOTBILI-wnl, or within 3x the UNL       Last Thyroid (12 months or within 3 months of dosage adjustment)  Lab Results   Component Value Date    TSH 0.598 08/11/2018    TSH 0.523 08/04/2017    TSH 0.819 07/22/2016    Lab Results   Component Value Date    FREET4 1.25 07/17/2013    FREET4 1.22 06/22/2011    FREET4 1.05 06/24/2009        APPOINTMENTS (past 12m or future 3m  authorizing provider)  LAST VISIT DATE  JOLENE Bailey MD 5/28/2018         NEXT VISIT DATE  JOLENE Bailey MD 10/7/2019

## 2019-06-27 NOTE — TELEPHONE ENCOUNTER
----- Message from Antoni Andersond sent at 6/27/2019  9:24 AM CDT -----  Contact: Patient [phone]  Type:  RX Refill Request    Who Called:  Patient [phone] Please call today.    Refill or New Rx:  Refill    RX Name and Strength:  SYNTHROID 50 mcg tablet 90 tablet 3 5/28/2018    Sig - Route: Take 1 tablet (50 mcg total) by mouth once daily. - Oral   Sent to pharmacy as: SYNTHROID 50 mcg tablet     And    atorvastatin (LIPITOR) 10 MG tablet 90 tablet 3 5/28/2018    Sig - Route: Take 1 tablet (10 mg total) by mouth once daily. - Oral   Sent to pharmacy as: atorvastatin (LIPITOR) 10 MG tablet     How is the patient currently taking it? (ex. 1XDay):  See above.    Is this a 30 day or 90 day RX:  90 day    Preferred Pharmacy with phone number:     [facility], [place] [address]  [phone]    Local or Mail Order:  Local    Ordering Provider:  Dr. Bailey    Best Call Back Number:  [phone]    Additional Information:  Advised needs to speak with the nurse regarding previous denial from Dr Bailey's office per the pharmacy. Advised she is in short supply on both medcations. Has an appmnt with Dr Bailey on 10-07-19 soonest available, has seen Dr Bailey in May 2018 previously. Needs these medications filled as soon as possible leaving [facility] next week, please send to pharmacy today and advise when sent.

## 2019-06-28 ENCOUNTER — TELEPHONE (OUTPATIENT)
Dept: FAMILY MEDICINE | Facility: CLINIC | Age: 69
End: 2019-06-28

## 2019-06-28 RX ORDER — LEVOTHYROXINE SODIUM 50 UG/1
50 TABLET ORAL DAILY
Qty: 90 TABLET | Refills: 0 | Status: SHIPPED | OUTPATIENT
Start: 2019-06-28 | End: 2019-09-03 | Stop reason: SDUPTHER

## 2019-06-28 RX ORDER — ATORVASTATIN CALCIUM 10 MG/1
10 TABLET, FILM COATED ORAL DAILY
Qty: 90 TABLET | Refills: 0 | Status: SHIPPED | OUTPATIENT
Start: 2019-06-28 | End: 2019-09-03 | Stop reason: SDUPTHER

## 2019-06-28 NOTE — TELEPHONE ENCOUNTER
----- Message from Johana Perez sent at 6/28/2019  9:58 AM CDT -----  Contact: pt  Type: Needs Medical Advice    Who Called:  pt  Pharmacy name and phone #:    [facility] [place] [facility] [address]  [address]  [facility] [address]  Phone: [phone] Fax: [phone]  Best Call Back Number [phone]  Additional Information: Pt called on yesterday requesting a refill request and a callback and so far neither has been done per patient

## 2019-08-12 ENCOUNTER — HOSPITAL ENCOUNTER (OUTPATIENT)
Dept: RADIOLOGY | Facility: HOSPITAL | Age: 69
Discharge: HOME | End: 2019-08-12
Attending: INTERNAL MEDICINE
Payer: COMMERCIAL

## 2019-08-12 ENCOUNTER — HOSPITAL ENCOUNTER (OUTPATIENT)
Dept: RADIOLOGY | Facility: HOSPITAL | Age: 69
Discharge: HOME | End: 2019-08-12
Attending: FAMILY MEDICINE
Payer: COMMERCIAL

## 2019-08-12 DIAGNOSIS — E04.2 MULTINODULAR GOITER: ICD-10-CM

## 2019-08-12 DIAGNOSIS — Z12.39 SCREENING FOR BREAST CANCER: ICD-10-CM

## 2019-08-12 PROCEDURE — 76536 US EXAM OF HEAD AND NECK: CPT | Mod: TC,PO

## 2019-08-12 PROCEDURE — 77067 MAMMO DIGITAL SCREENING BILAT WITH TOMOSYNTHESIS_CAD: ICD-10-PCS | Mod: 26,,, | Performed by: RADIOLOGY

## 2019-08-12 PROCEDURE — 77067 SCR MAMMO BI INCL CAD: CPT | Mod: 26,,, | Performed by: RADIOLOGY

## 2019-08-12 PROCEDURE — 77063 BREAST TOMOSYNTHESIS BI: CPT | Mod: 26,,, | Performed by: RADIOLOGY

## 2019-08-12 PROCEDURE — 76536 US EXAM OF HEAD AND NECK: CPT | Mod: 26,,, | Performed by: RADIOLOGY

## 2019-08-12 PROCEDURE — 77063 MAMMO DIGITAL SCREENING BILAT WITH TOMOSYNTHESIS_CAD: ICD-10-PCS | Mod: 26,,, | Performed by: RADIOLOGY

## 2019-08-12 PROCEDURE — 77067 SCR MAMMO BI INCL CAD: CPT | Mod: TC,PO

## 2019-08-12 PROCEDURE — 76536 US SOFT TISSUE HEAD NECK THYROID: ICD-10-PCS | Mod: 26,,, | Performed by: RADIOLOGY

## 2019-08-16 ENCOUNTER — TELEPHONE (OUTPATIENT)
Dept: FAMILY MEDICINE | Facility: CLINIC | Age: 69
End: 2019-08-16

## 2019-08-16 NOTE — TELEPHONE ENCOUNTER
I called Pt and advised her that Dr Bailey was not in the office on today and that I would send a message to ask if he would review her mammogram results and give recommendations. Please advise.

## 2019-08-16 NOTE — TELEPHONE ENCOUNTER
----- Message from Lluvia Alvarez sent at 8/16/2019  3:46 PM CDT -----  Contact: Pt  Type:  Test Results    Who Called: Pt  Name of Test (Lab/Mammo/Etc):  mammogram  Date of Test:  08/12/2019  Ordering Provider:  Dr. Bailey  Where the test was performed:  [facility] Call Back Number:  [phone]  Additional Information:  Please Advise ---Thank you

## 2019-08-17 ENCOUNTER — LAB VISIT (OUTPATIENT)
Dept: LAB | Facility: HOSPITAL | Age: 69
End: 2019-08-17
Attending: FAMILY MEDICINE
Payer: COMMERCIAL

## 2019-08-17 DIAGNOSIS — E78.00 HYPERCHOLESTEREMIA: ICD-10-CM

## 2019-08-17 DIAGNOSIS — E03.4 HYPOTHYROIDISM DUE TO ACQUIRED ATROPHY OF THYROID: Chronic | ICD-10-CM

## 2019-08-17 LAB
ALBUMIN SERPL BCP-MCNC: 3.9 G/DL (ref 3.5–5.2)
ALP SERPL-CCNC: 57 U/L (ref 55–135)
ALT SERPL W/O P-5'-P-CCNC: 15 U/L (ref 10–44)
ANION GAP SERPL CALC-SCNC: 7 MMOL/L (ref 8–16)
AST SERPL-CCNC: 15 U/L (ref 10–40)
BILIRUB SERPL-MCNC: 0.9 MG/DL (ref 0.1–1)
BUN SERPL-MCNC: 23 MG/DL (ref 8–23)
CALCIUM SERPL-MCNC: 9.4 MG/DL (ref 8.7–10.5)
CHLORIDE SERPL-SCNC: 107 MMOL/L (ref 95–110)
CHOLEST SERPL-MCNC: 182 MG/DL (ref 120–199)
CHOLEST/HDLC SERPL: 3 {RATIO} (ref 2–5)
CO2 SERPL-SCNC: 27 MMOL/L (ref 23–29)
CREAT SERPL-MCNC: 0.7 MG/DL (ref 0.5–1.4)
EST. GFR  (AFRICAN AMERICAN): >60 ML/MIN/1.73 M^2
EST. GFR  (NON AFRICAN AMERICAN): >60 ML/MIN/1.73 M^2
GLUCOSE SERPL-MCNC: 99 MG/DL (ref 70–110)
HDLC SERPL-MCNC: 60 MG/DL (ref 40–75)
HDLC SERPL: 33 % (ref 20–50)
LDLC SERPL CALC-MCNC: 109.4 MG/DL (ref 63–159)
NONHDLC SERPL-MCNC: 122 MG/DL
POTASSIUM SERPL-SCNC: 4.3 MMOL/L (ref 3.5–5.1)
PROT SERPL-MCNC: 6.5 G/DL (ref 6–8.4)
SODIUM SERPL-SCNC: 141 MMOL/L (ref 136–145)
T4 FREE SERPL-MCNC: 1.03 NG/DL (ref 0.71–1.51)
TRIGL SERPL-MCNC: 63 MG/DL (ref 30–150)
TSH SERPL DL<=0.005 MIU/L-ACNC: 0.38 UIU/ML (ref 0.4–4)

## 2019-08-17 PROCEDURE — 80061 LIPID PANEL: CPT

## 2019-08-17 PROCEDURE — 80053 COMPREHEN METABOLIC PANEL: CPT

## 2019-08-17 PROCEDURE — 84439 ASSAY OF FREE THYROXINE: CPT

## 2019-08-17 PROCEDURE — 36415 COLL VENOUS BLD VENIPUNCTURE: CPT | Mod: PO

## 2019-08-17 PROCEDURE — 84443 ASSAY THYROID STIM HORMONE: CPT

## 2019-08-21 NOTE — TELEPHONE ENCOUNTER
Your mammogram was reviewed and is negative which is good news.  We can repeat the test in one year.    Thank you,    Dr. Bailey

## 2019-08-28 ENCOUNTER — TELEPHONE (OUTPATIENT)
Dept: FAMILY MEDICINE | Facility: CLINIC | Age: 69
End: 2019-08-28

## 2019-08-28 NOTE — TELEPHONE ENCOUNTER
----- Message from Digna Barrios sent at 8/28/2019  1:56 PM CDT -----  Type:  Test Results    Who Called:  Patient  Name of Test (Lab/Mammo/Etc):  Lab  Date of Test:  8/17/19  Ordering Provider:  Leo  Where the test was performed:  [facility]  Best Call Back Number:  [phone] (home)

## 2019-09-03 ENCOUNTER — TELEPHONE (OUTPATIENT)
Dept: FAMILY MEDICINE | Facility: CLINIC | Age: 69
End: 2019-09-03

## 2019-09-03 DIAGNOSIS — E78.00 HYPERCHOLESTEREMIA: ICD-10-CM

## 2019-09-03 DIAGNOSIS — E03.4 HYPOTHYROIDISM DUE TO ACQUIRED ATROPHY OF THYROID: Chronic | ICD-10-CM

## 2019-09-04 RX ORDER — LEVOTHYROXINE SODIUM 50 UG/1
50 TABLET ORAL DAILY
Qty: 90 TABLET | Refills: 0 | Status: SHIPPED | OUTPATIENT
Start: 2019-09-04 | End: 2019-11-01 | Stop reason: SDUPTHER

## 2019-09-04 RX ORDER — ATORVASTATIN CALCIUM 10 MG/1
10 TABLET, FILM COATED ORAL DAILY
Qty: 90 TABLET | Refills: 0 | Status: SHIPPED | OUTPATIENT
Start: 2019-09-04 | End: 2019-11-01 | Stop reason: SDUPTHER

## 2019-09-05 ENCOUNTER — TELEPHONE (OUTPATIENT)
Dept: FAMILY MEDICINE | Facility: CLINIC | Age: 69
End: 2019-09-05

## 2019-09-05 NOTE — TELEPHONE ENCOUNTER
----- Message from Geovanna Thayer sent at 9/5/2019  3:06 PM CDT -----  Type:  Patient Returning Call    Who Called:  Patient   Who Left Message for Patient:  Desi Armenta  Does the patient know what this is regarding?:  results  Best Call Back Number:  [phone]  Additional Information:

## 2019-09-05 NOTE — TELEPHONE ENCOUNTER
Phoned pt in regards to message. Pt is inquiring about test results LABS AND IMAGING and is wanting Dr Bailey to review and get back with her ASAP as she states she does not need the RX  SYNTHROID right now filled. Please review and advise. Thank you. CLC

## 2019-09-06 ENCOUNTER — TELEPHONE (OUTPATIENT)
Dept: FAMILY MEDICINE | Facility: CLINIC | Age: 69
End: 2019-09-06

## 2019-09-06 NOTE — TELEPHONE ENCOUNTER
Spoke to pt and she is very upset because she wants results of  the labs and u/s that she done in Aug that was ordered by Dr Uriostegui. Please call pt asap with results of these. They were not ordered by Dr Cade.

## 2019-09-06 NOTE — TELEPHONE ENCOUNTER
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  Call back number is [phone] ([phone])

## 2019-09-06 NOTE — TELEPHONE ENCOUNTER
I spoke with pt she is very frustrated that she has not heard back about her ultrasound or her labs. She said a nurse told her yesterday that her thyroid test is abnormal. But she has not heard anything else or if she has fuller med . She would like to have a call back about her results.   She said I am the third nurse to talk to her.

## 2019-09-08 NOTE — TELEPHONE ENCOUNTER
Addressed on 9-3; I would not adjust thyroid medication at this time but she can ask dr. dodson's opinion if she likes

## 2019-09-09 ENCOUNTER — TELEPHONE (OUTPATIENT)
Dept: FAMILY MEDICINE | Facility: CLINIC | Age: 69
End: 2019-09-09

## 2019-09-09 DIAGNOSIS — E03.4 HYPOTHYROIDISM DUE TO ACQUIRED ATROPHY OF THYROID: ICD-10-CM

## 2019-09-09 DIAGNOSIS — E04.1 THYROID NODULE: Primary | ICD-10-CM

## 2019-09-09 NOTE — TELEPHONE ENCOUNTER
----- Message from Lori Elliott sent at 9/9/2019  3:56 PM CDT -----  Contact: patient  Type:  Patient Returning Call    Who Called:  patient  Who Left Message for Patient:  Sha   Does the patient know what this is regarding?:    Best Call Back Number:  [phone]  Additional Information:

## 2019-09-09 NOTE — TELEPHONE ENCOUNTER
Spoke to pt and adv of Dr Cade's previous message, voiced understanding. Pt stated she has appt in Dec wth Dr Cade, does she need to keep it or cxl it?

## 2019-09-09 NOTE — TELEPHONE ENCOUNTER
----- Message from Lori Elliott sent at 9/9/2019  3:57 PM CDT -----  Contact: patient  Type: Needs Medical Advice    Who Called:  patient  Best Call Back Number: [phone]  Additional Information: requesting a call back in regards to her results.

## 2019-09-09 NOTE — TELEPHONE ENCOUNTER
It looks like patient was supposed to have followed up after labs. Let her know US shows no change. F/U 1 year with TSH, Thyroid US    TSH mildly suppressed. Repeat TSH in 6 weeks

## 2019-09-09 NOTE — TELEPHONE ENCOUNTER
I left Pt a voicemail for her to return the call to the office about her lab and ultrasound results.

## 2019-09-09 NOTE — TELEPHONE ENCOUNTER
I spoke with the Pt about her lab results but, she's still requesting the results for her thyroid ultrasound. The referring doctor listed was . Please advise.

## 2019-10-07 ENCOUNTER — OFFICE VISIT (OUTPATIENT)
Dept: FAMILY MEDICINE | Facility: CLINIC | Age: 69
End: 2019-10-07
Payer: COMMERCIAL

## 2019-10-07 VITALS
BODY MASS INDEX: 22.98 KG/M2 | TEMPERATURE: 99 F | HEART RATE: 92 BPM | SYSTOLIC BLOOD PRESSURE: 128 MMHG | DIASTOLIC BLOOD PRESSURE: 82 MMHG | OXYGEN SATURATION: 95 % | WEIGHT: 143 LBS | HEIGHT: 66 IN

## 2019-10-07 DIAGNOSIS — Z00.00 WELLNESS EXAMINATION: Primary | ICD-10-CM

## 2019-10-07 DIAGNOSIS — E78.00 HYPERCHOLESTEREMIA: Chronic | ICD-10-CM

## 2019-10-07 DIAGNOSIS — E03.4 HYPOTHYROIDISM DUE TO ACQUIRED ATROPHY OF THYROID: Chronic | ICD-10-CM

## 2019-10-07 PROCEDURE — 99397 PR PREVENTIVE VISIT,EST,65 & OVER: ICD-10-PCS | Mod: S$GLB,,, | Performed by: FAMILY MEDICINE

## 2019-10-07 PROCEDURE — 99397 PER PM REEVAL EST PAT 65+ YR: CPT | Mod: S$GLB,,, | Performed by: FAMILY MEDICINE

## 2019-10-07 PROCEDURE — 3074F PR MOST RECENT SYSTOLIC BLOOD PRESSURE < 130 MM HG: ICD-10-PCS | Mod: CPTII,S$GLB,, | Performed by: FAMILY MEDICINE

## 2019-10-07 PROCEDURE — 3079F DIAST BP 80-89 MM HG: CPT | Mod: CPTII,S$GLB,, | Performed by: FAMILY MEDICINE

## 2019-10-07 PROCEDURE — 99999 PR PBB SHADOW E&M-EST. PATIENT-LVL III: ICD-10-PCS | Mod: PBBFAC,,, | Performed by: FAMILY MEDICINE

## 2019-10-07 PROCEDURE — 99999 PR PBB SHADOW E&M-EST. PATIENT-LVL III: CPT | Mod: PBBFAC,,, | Performed by: FAMILY MEDICINE

## 2019-10-07 PROCEDURE — 3079F PR MOST RECENT DIASTOLIC BLOOD PRESSURE 80-89 MM HG: ICD-10-PCS | Mod: CPTII,S$GLB,, | Performed by: FAMILY MEDICINE

## 2019-10-07 PROCEDURE — 3074F SYST BP LT 130 MM HG: CPT | Mod: CPTII,S$GLB,, | Performed by: FAMILY MEDICINE

## 2019-10-07 RX ORDER — BIMATOPROST 3 UG/ML
SOLUTION TOPICAL
Qty: 5 ML | Refills: 0 | Status: SHIPPED | OUTPATIENT
Start: 2019-10-07 | End: 2019-10-07 | Stop reason: SDUPTHER

## 2019-10-07 RX ORDER — VALACYCLOVIR HYDROCHLORIDE 1 G/1
2000 TABLET, FILM COATED ORAL 2 TIMES DAILY
Qty: 30 TABLET | Refills: 1 | Status: SHIPPED | OUTPATIENT
Start: 2019-10-07 | End: 2019-10-07

## 2019-10-07 RX ORDER — BIMATOPROST 3 UG/ML
SOLUTION TOPICAL
Qty: 5 ML | Refills: 0 | Status: SHIPPED | OUTPATIENT
Start: 2019-10-07 | End: 2020-10-08 | Stop reason: SDUPTHER

## 2019-10-07 RX ORDER — VALACYCLOVIR HYDROCHLORIDE 1 G/1
2000 TABLET, FILM COATED ORAL 2 TIMES DAILY
Qty: 30 TABLET | Refills: 1 | Status: SHIPPED | OUTPATIENT
Start: 2019-10-07 | End: 2020-10-08 | Stop reason: SDUPTHER

## 2019-10-07 RX ORDER — TAZAROTENE 0.5 MG/G
GEL TOPICAL NIGHTLY
Qty: 100 G | Refills: 1 | Status: SHIPPED | OUTPATIENT
Start: 2019-10-07 | End: 2020-10-08

## 2019-10-07 NOTE — PROGRESS NOTES
Subjective:       Patient ID: Opal Sahu is a 69 y.o. female.    Chief Complaint: Annual Exam    Here for wellness and f/u lipids. Doing well overall.     Hyperlipidemia   This is a chronic problem. The current episode started more than 1 year ago. The problem is controlled. Pertinent negatives include no chest pain or shortness of breath.     Review of Systems   Constitutional: Negative for chills, fatigue and fever.   Respiratory: Negative for cough, chest tightness and shortness of breath.    Cardiovascular: Negative for chest pain, palpitations and leg swelling.   Endocrine: Negative for cold intolerance and heat intolerance.   Skin: Negative for rash.   Psychiatric/Behavioral: Negative for dysphoric mood. The patient is not nervous/anxious.        Objective:      Physical Exam   Constitutional: She appears well-developed and well-nourished.   HENT:   Head: Normocephalic and atraumatic.   Cardiovascular: Normal rate, regular rhythm and normal heart sounds.   Pulmonary/Chest: Effort normal and breath sounds normal.   Psychiatric: She has a normal mood and affect.   Nursing note and vitals reviewed.      Assessment:       1. Wellness examination    2. Hypercholesteremia    3. Hypothyroidism due to acquired atrophy of thyroid        Plan:       Wellness examination  -     CBC auto differential; Future; Expected date: 10/07/2019    Hypercholesteremia    Hypothyroidism due to acquired atrophy of thyroid    Other orders  -     Discontinue: valACYclovir (VALTREX) 1000 MG tablet; Take 2 tablets (2,000 mg total) by mouth 2 (two) times daily. for 5 days  Dispense: 30 tablet; Refill: 1  -     Discontinue: bimatoprost (LATISSE) 0.03 % ophthalmic solution; Place one drop on applicator and apply evenly along the skin of the upper eyelid at base of eyelashes once daily at bedtime; repeat procedure for second eye (use a clean applicator).  Dispense: 5 mL; Refill: 0  -     tazarotene (TAZORAC) 0.05 % gel; Apply topically  every evening.  Dispense: 100 g; Refill: 1  -     valACYclovir (VALTREX) 1000 MG tablet; Take 2 tablets (2,000 mg total) by mouth 2 (two) times daily. for 1 day  Dispense: 30 tablet; Refill: 1  -     bimatoprost (LATISSE) 0.03 % ophthalmic solution; Place one drop on applicator and apply evenly along the skin of the upper eyelid at base of eyelashes once daily at bedtime; repeat procedure for second eye (use a clean applicator).  Dispense: 5 mL; Refill: 0       Reviewed labs and stable.  Will change labs to annual and not problem as was for this visit.  Refilled needed medications.    Addendum: recent labs from 8- should have wellness examination as diagnosis code  10-17-19 Dr. Bailey    Follow up in about 1 year (around 10/7/2020), or if symptoms worsen or fail to improve.

## 2019-10-22 ENCOUNTER — TELEPHONE (OUTPATIENT)
Dept: FAMILY MEDICINE | Facility: CLINIC | Age: 69
End: 2019-10-22

## 2019-10-22 NOTE — TELEPHONE ENCOUNTER
----- Message from Antoni Teixeira sent at 10/22/2019  2:23 PM CDT -----  Contact: Ptnt  [phone]  Type: Needs Medical Advice    Who Called:  Ptnt  [phone]    Additional Information: Advised would like to have Hep C test also added to her lab orders. Please call to advise when order issued.

## 2019-10-23 ENCOUNTER — TELEPHONE (OUTPATIENT)
Dept: FAMILY MEDICINE | Facility: CLINIC | Age: 69
End: 2019-10-23

## 2019-10-23 DIAGNOSIS — Z11.59 NEED FOR HEPATITIS C SCREENING TEST: Primary | ICD-10-CM

## 2019-10-23 NOTE — TELEPHONE ENCOUNTER
Spoke to pt. Pt states she spoke to Dr. Bailey about doing lab work for hep c since pt was possibly exposed to it. Please advise.

## 2019-10-23 NOTE — TELEPHONE ENCOUNTER
----- Message from Lisset Mendoza sent at 10/23/2019  4:05 PM CDT -----  Who Called:  pt  Who Left Message for Patient:  Deloris   Does the patient know what this is regarding?:  Adding a hep C test to her blood test  Best Call Back Number:  [phone]  Additional Information:  Please leave a message that the order has been added

## 2019-10-23 NOTE — TELEPHONE ENCOUNTER
----- Message from Kevan Huizar sent at 10/23/2019  3:04 PM CDT -----  Contact: pt  Type:  Patient Returning Call    Who Called:  pt  Who Left Message for Patient:  Kristen  Does the patient know what this is regarding?:  Adding a hep C test to her blood test  Best Call Back Number:  [phone]  Additional Information:  Please leave a message if pt is unable to answer.

## 2019-10-23 NOTE — TELEPHONE ENCOUNTER
Left message for patient, she had hep c screen in 2016. Is there a reason she is needing hep c screen.

## 2019-10-23 NOTE — TELEPHONE ENCOUNTER
----- Message from Evie Betancourt sent at 10/23/2019  4:51 PM CDT -----  Contact: Opla harris  Type:  Patient Returning Call    Who Called:  Opal  Who Left Message for Patient:  Jacqueline  Does the patient know what this is regarding?:  Hep c testing  Best Call Back Number:  [phone]  Additional Information:  Pls call pt back

## 2019-10-24 ENCOUNTER — TELEPHONE (OUTPATIENT)
Dept: FAMILY MEDICINE | Facility: CLINIC | Age: 69
End: 2019-10-24

## 2019-10-24 NOTE — TELEPHONE ENCOUNTER
Patient believes she's been recently exposed to Hep C and want to rule out any possibilities. States she previously talked to you on 10/5 about this issue. Lab order pended.

## 2019-10-24 NOTE — TELEPHONE ENCOUNTER
----- Message from Laura Hill sent at 10/24/2019  4:22 PM CDT -----  Contact: Patient  Type:  Patient Returning Call    Who Called:  Patient  Who Left Message for Patient:  Neema  Does the patient know what this is regarding?:  labs  Best Call Back Number: [phone]

## 2019-10-24 NOTE — TELEPHONE ENCOUNTER
----- Message from Melissa Pardo sent at 10/24/2019  2:06 PM CDT -----  Contact: Patient  Type: Needs Medical Advice    Who Called:  Patient  Best Call Back Number:   Additional Information: Calling to speak with the Nurse again about the lab orders that she requested to be put in the system. Call to pod. No answer

## 2019-10-25 ENCOUNTER — LAB VISIT (OUTPATIENT)
Dept: LAB | Facility: HOSPITAL | Age: 69
End: 2019-10-25
Attending: FAMILY MEDICINE
Payer: COMMERCIAL

## 2019-10-25 DIAGNOSIS — Z00.00 WELLNESS EXAMINATION: ICD-10-CM

## 2019-10-25 DIAGNOSIS — Z11.59 NEED FOR HEPATITIS C SCREENING TEST: ICD-10-CM

## 2019-10-25 LAB
BASOPHILS # BLD AUTO: 0.04 K/UL (ref 0–0.2)
BASOPHILS NFR BLD: 0.7 % (ref 0–1.9)
DIFFERENTIAL METHOD: ABNORMAL
EOSINOPHIL # BLD AUTO: 0.2 K/UL (ref 0–0.5)
EOSINOPHIL NFR BLD: 2.8 % (ref 0–8)
ERYTHROCYTE [DISTWIDTH] IN BLOOD BY AUTOMATED COUNT: 12.5 % (ref 11.5–14.5)
HCT VFR BLD AUTO: 36.2 % (ref 37–48.5)
HGB BLD-MCNC: 11.9 G/DL (ref 12–16)
IMM GRANULOCYTES # BLD AUTO: 0.01 K/UL (ref 0–0.04)
IMM GRANULOCYTES NFR BLD AUTO: 0.2 % (ref 0–0.5)
LYMPHOCYTES # BLD AUTO: 2.1 K/UL (ref 1–4.8)
LYMPHOCYTES NFR BLD: 35.8 % (ref 18–48)
MCH RBC QN AUTO: 30.3 PG (ref 27–31)
MCHC RBC AUTO-ENTMCNC: 32.9 G/DL (ref 32–36)
MCV RBC AUTO: 92 FL (ref 82–98)
MONOCYTES # BLD AUTO: 0.7 K/UL (ref 0.3–1)
MONOCYTES NFR BLD: 12.2 % (ref 4–15)
NEUTROPHILS # BLD AUTO: 2.8 K/UL (ref 1.8–7.7)
NEUTROPHILS NFR BLD: 48.3 % (ref 38–73)
NRBC BLD-RTO: 0 /100 WBC
PLATELET # BLD AUTO: 348 K/UL (ref 150–350)
PMV BLD AUTO: 9.4 FL (ref 9.2–12.9)
RBC # BLD AUTO: 3.93 M/UL (ref 4–5.4)
WBC # BLD AUTO: 5.76 K/UL (ref 3.9–12.7)

## 2019-10-25 PROCEDURE — 85025 COMPLETE CBC W/AUTO DIFF WBC: CPT

## 2019-10-25 PROCEDURE — 86803 HEPATITIS C AB TEST: CPT

## 2019-10-28 ENCOUNTER — TELEPHONE (OUTPATIENT)
Dept: ENDOCRINOLOGY | Facility: CLINIC | Age: 69
End: 2019-10-28

## 2019-10-28 LAB — HCV AB SERPL QL IA: NEGATIVE

## 2019-10-28 NOTE — TELEPHONE ENCOUNTER
----- Message from Bibi Huber sent at 10/28/2019  2:31 PM CDT -----  Type:  Patient Returning Call    Who Called:  Patient - Opal   Who Left Message for Patient:  Tiffanie   Does the patient know what this is regarding?:  Should we keep same dosage   Best Call Back Number:  [phone]   Additional Information:  Returned call

## 2019-11-01 DIAGNOSIS — E03.4 HYPOTHYROIDISM DUE TO ACQUIRED ATROPHY OF THYROID: Chronic | ICD-10-CM

## 2019-11-01 DIAGNOSIS — E78.00 HYPERCHOLESTEREMIA: ICD-10-CM

## 2019-11-01 RX ORDER — LEVOTHYROXINE SODIUM 50 UG/1
50 TABLET ORAL DAILY
Qty: 30 TABLET | Refills: 0 | Status: SHIPPED | OUTPATIENT
Start: 2019-11-01 | End: 2019-11-26 | Stop reason: SDUPTHER

## 2019-11-01 RX ORDER — ATORVASTATIN CALCIUM 10 MG/1
10 TABLET, FILM COATED ORAL DAILY
Qty: 90 TABLET | Refills: 0 | Status: SHIPPED | OUTPATIENT
Start: 2019-11-01 | End: 2020-03-25

## 2019-11-26 DIAGNOSIS — E03.4 HYPOTHYROIDISM DUE TO ACQUIRED ATROPHY OF THYROID: Chronic | ICD-10-CM

## 2019-11-27 RX ORDER — LEVOTHYROXINE SODIUM 50 UG/1
50 TABLET ORAL DAILY
Qty: 90 TABLET | Refills: 3 | Status: SHIPPED | OUTPATIENT
Start: 2019-11-27 | End: 2020-03-24 | Stop reason: SDUPTHER

## 2019-11-27 NOTE — PROGRESS NOTES
Refill Authorization Note     is requesting a refill authorization.    Brief assessment and rationale for refill: APPROVE: prr          Medication Therapy Plan: TSH and FREE T4-wnl              Comments:   Requested Prescriptions   Pending Prescriptions Disp Refills    SYNTHROID 50 mcg tablet [Pharmacy Med Name: SYNTHROID 50 MCG TABLET] 90 tablet 2     Sig: Take 1 tablet (50 mcg total) by mouth once daily.       Endocrinology:  Hypothyroid Agents Failed - 11/27/2019  3:19 PM        Failed - If TSH was abnormal but FT4 was WNL, okay to refill. FT4 is not required if TSH is WNL.        Passed - Patient is at least 18 years old        Passed - Office visit in past 12 months or future 90 days     Recent Outpatient Visits            1 month ago Wellness examination    [facility] JOLENE Bailey MD    9 months ago Primary osteoarthritis of right knee    [facility] Jorge Montoya MD    9 months ago Osteoarthritis of both knees, unspecified osteoarthritis type    [facility] Jorge Montoya MD    10 months ago Chronic pain of both knees    [facility] Jorge Montoya MD    10 months ago Primary osteoarthritis of left knee    [facility] Jorge Montoya MD          Future Appointments              In 3 weeks Seamus Cade DO [place] [facility] [place]                Passed - TSH in normal range and within 360 days     TSH   Date Value Ref Range Status   10/25/2019 0.586 0.400 - 4.000 uIU/mL Final   08/17/2019 0.383 (L) 0.400 - 4.000 uIU/mL Final   08/11/2018 0.598 0.400 - 4.000 uIU/mL Final              Passed - T4 free in normal range and within 360 days     Free T4   Date Value Ref Range Status   08/17/2019 1.03 0.71 - 1.51 ng/dL Final   07/17/2013 1.25 0.71 - 1.51 ng/dl Final   06/22/2011 1.22 0.71 - 1.51 ng/dl Final

## 2019-12-13 ENCOUNTER — TELEPHONE (OUTPATIENT)
Dept: FAMILY MEDICINE | Facility: CLINIC | Age: 69
End: 2019-12-13

## 2019-12-13 DIAGNOSIS — Z12.11 SCREEN FOR COLON CANCER: Primary | ICD-10-CM

## 2019-12-13 NOTE — TELEPHONE ENCOUNTER
----- Message from Sergio Sen sent at 12/13/2019  2:27 PM CST -----  Contact: self   Patient want to speak with a nurse regarding discussing colonoscopy and referral please call back at [phone]    Case number [id number]

## 2019-12-13 NOTE — TELEPHONE ENCOUNTER
"Called pt, she would like colonoscopy done here please advise order pending.       Also she would like for you to received cbc done in 10/25/19 and let her know what you think about the "touch of anemia"?    "

## 2019-12-13 NOTE — TELEPHONE ENCOUNTER
----- Message from Princess JUDI Ryan sent at 12/13/2019  3:05 PM CST -----  Contact: Patient  Type:  Patient Returning Call    Who Called:  Patient  Who Left Message for Patient:  Nurse  Does the patient know what this is regarding?:  yes  Best Call Back Number:    Additional Information:  Returning the Nurse's call

## 2019-12-16 NOTE — TELEPHONE ENCOUNTER
"Also she would like for you to received cbc done in 10/25/19 and let her know what you think about the "touch of anemia"?  "

## 2019-12-18 ENCOUNTER — TELEPHONE (OUTPATIENT)
Dept: FAMILY MEDICINE | Facility: CLINIC | Age: 69
End: 2019-12-18

## 2019-12-18 NOTE — TELEPHONE ENCOUNTER
----- Message from Evie Betancourt sent at 12/17/2019  2:00 PM CST -----  Contact: Opal harris  Type: Needs Medical Advice    Who Called:  Opal  Symptoms (please be specific):  Pt is requesting a referral for a colonoscopy  Best Call Back Number: [phone]  Additional Information: Pls call pt regarding a dr for her colonoscopy. She did not want me to give her a list of dr's in [place].

## 2020-03-05 ENCOUNTER — TELEPHONE (OUTPATIENT)
Dept: GASTROENTEROLOGY | Facility: CLINIC | Age: 70
End: 2020-03-05

## 2020-03-24 ENCOUNTER — TELEPHONE (OUTPATIENT)
Dept: FAMILY MEDICINE | Facility: CLINIC | Age: 70
End: 2020-03-24

## 2020-03-24 DIAGNOSIS — E03.4 HYPOTHYROIDISM DUE TO ACQUIRED ATROPHY OF THYROID: Chronic | ICD-10-CM

## 2020-03-24 DIAGNOSIS — E78.00 HYPERCHOLESTEREMIA: Chronic | ICD-10-CM

## 2020-03-24 DIAGNOSIS — E78.00 HYPERCHOLESTEREMIA: ICD-10-CM

## 2020-03-24 DIAGNOSIS — Z12.39 SCREENING FOR BREAST CANCER: Primary | ICD-10-CM

## 2020-03-24 NOTE — TELEPHONE ENCOUNTER
----- Message from Cintia Bhatia sent at 3/24/2020  3:54 PM CDT -----  She is requesting that you enter the lab test orders for her annual physical.  Also please enter the mammo order.  Thank you!

## 2020-03-25 RX ORDER — ATORVASTATIN CALCIUM 10 MG/1
10 TABLET, FILM COATED ORAL DAILY
Qty: 90 TABLET | Refills: 0 | Status: SHIPPED | OUTPATIENT
Start: 2020-03-25 | End: 2020-04-27

## 2020-03-25 RX ORDER — LEVOTHYROXINE SODIUM 50 UG/1
50 TABLET ORAL DAILY
Qty: 90 TABLET | Refills: 3 | Status: SHIPPED | OUTPATIENT
Start: 2020-03-25 | End: 2020-10-28

## 2020-04-22 DIAGNOSIS — E78.00 HYPERCHOLESTEREMIA: ICD-10-CM

## 2020-04-27 RX ORDER — ATORVASTATIN CALCIUM 10 MG/1
10 TABLET, FILM COATED ORAL DAILY
Qty: 90 TABLET | Refills: 1 | Status: SHIPPED | OUTPATIENT
Start: 2020-04-27 | End: 2020-09-28

## 2020-04-27 NOTE — PROGRESS NOTES
Refill Authorization Note     is requesting a refill authorization.    Brief assessment and rationale for refill: APPROVE: prr               Medication reconciliation completed: No                         Comments:     Automatic Epic Protocol Generated Data:    Requested Prescriptions   Pending Prescriptions Disp Refills    atorvastatin (LIPITOR) 10 MG tablet [Pharmacy Med Name: atorvastatin 10 mg tablet] 90 tablet 1     Sig: Take 1 tablet (10 mg total) by mouth once daily.       Cardiovascular:  Antilipid - Statins Passed - 4/27/2020  4:43 PM        Passed - Patient is at least 18 years old        Passed - Office visit in past 12 months or future 90 days.     Recent Outpatient Visits            6 months ago Wellness examination    [facility] JOLENE Bailey MD    1 year ago Primary osteoarthritis of right knee    [facility] Jorge Montoya MD    1 year ago Osteoarthritis of both knees, unspecified osteoarthritis type    [facility] Jorge Montoya MD    1 year ago Chronic pain of both knees    [facility] Jorge Montoya MD    1 year ago Primary osteoarthritis of left knee    [facility] Jorge Montoya MD          Future Appointments              In 5 months JOLENE Bailey MD [facility]                Passed - Lipid Panel completed in last 360 days     Lab Results   Component Value Date    CHOL 182 08/17/2019    HDL 60 08/17/2019    LDLCALC 109.4 08/17/2019    TRIG 63 08/17/2019             Passed - ALT is 94 or below and within 360 days     ALT   Date Value Ref Range Status   08/17/2019 15 10 - 44 U/L Final   08/11/2018 16 10 - 44 U/L Final   08/04/2017 14 10 - 44 U/L Final              Passed - AST is 54 or below and within 360 days     AST   Date Value Ref Range Status   08/17/2019 15 10 - 40 U/L Final   08/11/2018 14 10 - 40 U/L Final   08/04/2017 13 10 - 40 U/L  Final                 Appointments  past 12m or future 3m with PCP    Date Provider   Last Visit   10/7/2019 JOLENE Bailey MD   Next Visit   10/8/2020 JOLENE Bailey MD   ED visits in past 90 days: 0     Note composed:4:44 PM 04/27/2020

## 2020-05-15 ENCOUNTER — TELEPHONE (OUTPATIENT)
Dept: GASTROENTEROLOGY | Facility: CLINIC | Age: 70
End: 2020-05-15

## 2020-06-24 ENCOUNTER — OFFICE VISIT (OUTPATIENT)
Dept: PHYSICAL MEDICINE AND REHAB | Facility: CLINIC | Age: 70
End: 2020-06-24
Payer: COMMERCIAL

## 2020-06-24 VITALS — BODY MASS INDEX: 22.98 KG/M2 | HEIGHT: 66 IN | WEIGHT: 143 LBS

## 2020-06-24 DIAGNOSIS — M17.0 PRIMARY OSTEOARTHRITIS OF BOTH KNEES: Primary | ICD-10-CM

## 2020-06-24 PROCEDURE — 1101F PR PT FALLS ASSESS DOC 0-1 FALLS W/OUT INJ PAST YR: ICD-10-PCS | Mod: CPTII,S$GLB,, | Performed by: PHYSICAL MEDICINE & REHABILITATION

## 2020-06-24 PROCEDURE — 3008F PR BODY MASS INDEX (BMI) DOCUMENTED: ICD-10-PCS | Mod: CPTII,S$GLB,, | Performed by: PHYSICAL MEDICINE & REHABILITATION

## 2020-06-24 PROCEDURE — 1101F PT FALLS ASSESS-DOCD LE1/YR: CPT | Mod: CPTII,S$GLB,, | Performed by: PHYSICAL MEDICINE & REHABILITATION

## 2020-06-24 PROCEDURE — 1125F AMNT PAIN NOTED PAIN PRSNT: CPT | Mod: S$GLB,,, | Performed by: PHYSICAL MEDICINE & REHABILITATION

## 2020-06-24 PROCEDURE — 99999 PR PBB SHADOW E&M-EST. PATIENT-LVL III: ICD-10-PCS | Mod: PBBFAC,,, | Performed by: PHYSICAL MEDICINE & REHABILITATION

## 2020-06-24 PROCEDURE — 3008F BODY MASS INDEX DOCD: CPT | Mod: CPTII,S$GLB,, | Performed by: PHYSICAL MEDICINE & REHABILITATION

## 2020-06-24 PROCEDURE — 1159F MED LIST DOCD IN RCRD: CPT | Mod: S$GLB,,, | Performed by: PHYSICAL MEDICINE & REHABILITATION

## 2020-06-24 PROCEDURE — 1159F PR MEDICATION LIST DOCUMENTED IN MEDICAL RECORD: ICD-10-PCS | Mod: S$GLB,,, | Performed by: PHYSICAL MEDICINE & REHABILITATION

## 2020-06-24 PROCEDURE — 99204 PR OFFICE/OUTPT VISIT, NEW, LEVL IV, 45-59 MIN: ICD-10-PCS | Mod: S$GLB,,, | Performed by: PHYSICAL MEDICINE & REHABILITATION

## 2020-06-24 PROCEDURE — 1125F PR PAIN SEVERITY QUANTIFIED, PAIN PRESENT: ICD-10-PCS | Mod: S$GLB,,, | Performed by: PHYSICAL MEDICINE & REHABILITATION

## 2020-06-24 PROCEDURE — 99204 OFFICE O/P NEW MOD 45 MIN: CPT | Mod: S$GLB,,, | Performed by: PHYSICAL MEDICINE & REHABILITATION

## 2020-06-24 PROCEDURE — 99999 PR PBB SHADOW E&M-EST. PATIENT-LVL III: CPT | Mod: PBBFAC,,, | Performed by: PHYSICAL MEDICINE & REHABILITATION

## 2020-06-24 NOTE — PROGRESS NOTES
[facility]    CHIEF COMPLAINT:   Chief Complaint   Patient presents with    Knee Pain     Bilateral     HISTORY OF PRESENT ILLNESS: Opal Sahu is a 69 y.o. female who presents to me for the first time for evaluation and treatment of bilateral knee pain. Patient previously treated by Dr. Montoya, who performed steroid injections and Euflexxa injections of the bilateral knees, the most recent of which was a series of 4 Euflexxa injections completed in February '19.     At this visit, patient reports that she is not having a ton of pain, but more aching/discomfort that occurs with long-distance ambulation. Rest/breaks alleviate. She relates the pain to the medial joint line bilaterally. Denies any locking, grinding, clicking of the knee. No swelling. Does not report any recent trauma to the knee or falls. No weakness, paresthesias.    She reports that she has had very good response of several months from previous Euflexxa injections, but she would like to pursue something stronger and is interested in discussing PRP and stem cells. 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 She has previous MRIs of her knee with her performed at an OSH that show degenerative changes (partial medial meniscal tearing bilaterally, tricompartmental OA, baker's cysts).     Review of Systems   Constitutional: Negative for fever.   HENT: Negative for drooling.    Eyes: Negative for discharge.   Respiratory: Negative for choking.    Cardiovascular: Negative for chest pain.   Genitourinary: Negative for flank pain.   Skin: Negative for wound.   Allergic/Immunologic: Negative for immunocompromised state.   Neurological: Negative for tremors and syncope.   Psychiatric/Behavioral: Negative for behavioral problems.     Past Medical History:   Past Medical History:   Diagnosis Date    Hyperlipidemia     Hypothyroid        Past Surgical History:   Past Surgical History:    Procedure Laterality Date    None         Family History:   Family History   Problem Relation Age of Onset    Heart disease Mother     Cancer Father     Heart disease Brother     Breast cancer Other        Medications:   Current Outpatient Medications on File Prior to Visit   Medication Sig Dispense Refill    aspirin 81 mg Tab Every day      atorvastatin (LIPITOR) 10 MG tablet Take 1 tablet (10 mg total) by mouth once daily. 90 tablet 1    bimatoprost (LATISSE) 0.03 % ophthalmic solution Place one drop on applicator and apply evenly along the skin of the upper eyelid at base of eyelashes once daily at bedtime; repeat procedure for second eye (use a clean applicator). 5 mL 0    mupirocin calcium 2% (BACTROBAN) 2 % cream Apply topically 2 (two) times daily. Apply to woundTwice a day to the wound. 30 g 6    SYNTHROID 50 mcg tablet Take 1 tablet (50 mcg total) by mouth once daily. 90 tablet 3    tazarotene (TAZORAC) 0.05 % gel Apply topically every evening. 100 g 1    valACYclovir (VALTREX) 1000 MG tablet Take 2 tablets (2,000 mg total) by mouth 2 (two) times daily. for 1 day 30 tablet 1     No current facility-administered medications on file prior to visit.        Allergies:   Review of patient's allergies indicates:   Allergen Reactions    Povidone-iodine      Other reaction(s): Unknown       Social History:   Social History     Socioeconomic History    Marital status:      Spouse name: Not on file    Number of children: Not on file    Years of education: Not on file    Highest education level: Not on file   Occupational History     Employer: [email or web address]   Social Needs    Financial resource strain: Not on file    Food insecurity     Worry: Not on file     Inability: Not on file    Transportation needs     Medical: Not on file     Non-medical: Not on file   Tobacco Use    Smoking status: Never Smoker   Substance and Sexual Activity    Alcohol use: Yes    Drug use: No     "Sexual activity: Not on file   Lifestyle    Physical activity     Days per week: Not on file     Minutes per session: Not on file    Stress: Not on file   Relationships    Social connections     Talks on phone: Not on file     Gets together: Not on file     Attends Anglican service: Not on file     Active member of club or organization: Not on file     Attends meetings of clubs or organizations: Not on file     Relationship status: Not on file   Other Topics Concern    Not on file   Social History Narrative    Not on file     PHYSICAL EXAMINATION:   General    Vitals:    06/24/20 1516   Weight: 64.9 kg (143 lb)   Height: 5' 6" (1.676 m)     Constitutional: Oriented to person, place, and time. No apparent distress. Pleasant.  HENT:   Head: Normocephalic and atraumatic.   Eyes: Right eye exhibits no discharge. Left eye exhibits no discharge. No scleral icterus.   Pulmonary/Chest: Effort normal. No respiratory distress.   Abdominal: There is no guarding.   Neurological: Alert and oriented to person, place, and time.   Psychiatric: Behavior is normal.   Right Knee Exam     Tenderness   The patient is experiencing no tenderness.     Range of Motion   Extension: 0   Flexion: 140     Tests   Kaitlin:  Medial - positive Lateral - negative  Varus: negative Valgus: negative  Lachman:  Anterior - negative      Patellar apprehension: negative    Other   Erythema: absent  Scars: absent  Sensation: normal  Pulse: present  Swelling: none  Effusion: no effusion present    Comments:  + crepitus      Left Knee Exam     Tenderness   The patient is experiencing no tenderness.     Range of Motion   Extension: 0   Flexion: 140     Tests   Kaitlin:  Medial - negative Lateral - negative  Varus: negative Valgus: negative  Lachman:  Anterior - negative      Patellar apprehension: negative    Other   Erythema: absent  Scars: absent  Sensation: normal  Pulse: present  Swelling: none  Effusion: no effusion present        INSPECTION: " There is no swelling, ecchymoses, erythema or gross deformity.  GAIT/DYNAMIC: Normal.    Imaging: Bilateral Knee x-rays 6/3/2017  Reviewed. Consistent with moderate medial compartment OA on the right and mild on the left.     Data Reviewed: X-ray    Supportive Actions: Independent visualization of images or test specimens    ASSESSMENT:   1. Primary osteoarthritis of both knees      PLAN:     1. Time was spent reviewing the above diagnosis in depth with Opal today, including acute management and rehabilitation.     2.  We discussed that her knee pain is secondary to degenerative change.  This is confirmed by her x-rays as well as her MRIs.  I reviewed the MRI report from an outside facility showing chondral wear as well as meniscal tearing of both knees.  She lacks mechanical symptoms or significant effusions and either knee.  She has the goal of avoiding knee replacement.  She has had beneficial responses from injections of hyaluronic acid in the past and this would be appropriate to repeat.  Also discussed in depth regenerative type injections such as platelet rich plasma and bone marrow aspirate/stem-cell.  We discussed how these injections may improve the health of the joint, and in the case of bone marrow aspirate may be generate loss tissue.  She is interested in combining 1 of these injections with injection of hyaluronic acid.  We discussed that these injections do not guarantee complete or permanent pain relief or the need for further interventions.  She was issued some literature to read on PRP/bone marrow aspirate/stem-cell and she will plan to contact us if she would like to get scheduled.    3. Patient instructed to contact our office after she has made her decision to get scheduled for either bilateral PRP or stem cell injections of her knees, along with single shot injection of hyaluronic acid.    The above note was completed, in part, with the aid of Dragon dictation software/hardware. Translation  errors may be present.

## 2020-07-15 DIAGNOSIS — F41.9 ANXIETY: ICD-10-CM

## 2020-07-15 DIAGNOSIS — M17.0 PRIMARY OSTEOARTHRITIS OF BOTH KNEES: Primary | ICD-10-CM

## 2020-07-15 RX ORDER — DIAZEPAM 5 MG/1
5 TABLET ORAL ONCE
Qty: 2 TABLET | Refills: 0 | Status: SHIPPED | OUTPATIENT
Start: 2020-07-15 | End: 2020-10-08

## 2020-07-21 ENCOUNTER — OFFICE VISIT (OUTPATIENT)
Dept: PHYSICAL MEDICINE AND REHAB | Facility: CLINIC | Age: 70
End: 2020-07-21

## 2020-07-21 VITALS — BODY MASS INDEX: 22.98 KG/M2 | HEIGHT: 66 IN | WEIGHT: 143 LBS

## 2020-07-21 DIAGNOSIS — M17.0 PRIMARY OSTEOARTHRITIS OF BOTH KNEES: Primary | ICD-10-CM

## 2020-07-21 PROCEDURE — 99499 NO LOS: ICD-10-PCS | Mod: S$GLB,,, | Performed by: PHYSICAL MEDICINE & REHABILITATION

## 2020-07-21 PROCEDURE — 99999 PR PBB SHADOW E&M-EST. PATIENT-LVL III: CPT | Mod: PBBFAC,,, | Performed by: PHYSICAL MEDICINE & REHABILITATION

## 2020-07-21 PROCEDURE — 99499 UNLISTED E&M SERVICE: CPT | Mod: S$GLB,,, | Performed by: PHYSICAL MEDICINE & REHABILITATION

## 2020-07-21 PROCEDURE — 0232T NJX PLATELET PLASMA: CPT | Mod: CSM,S$GLB,, | Performed by: PHYSICAL MEDICINE & REHABILITATION

## 2020-07-21 PROCEDURE — 0232T PR INJECT PLATELET PLASMA W/IMG HARVEST/PREPARATOIN: ICD-10-PCS | Mod: CSM,S$GLB,, | Performed by: PHYSICAL MEDICINE & REHABILITATION

## 2020-07-21 PROCEDURE — 99999 PR PBB SHADOW E&M-EST. PATIENT-LVL III: ICD-10-PCS | Mod: PBBFAC,,, | Performed by: PHYSICAL MEDICINE & REHABILITATION

## 2020-07-21 NOTE — PROGRESS NOTES
[facility]    CHIEF COMPLAINT:   Chief Complaint   Patient presents with    Knee Pain     BMAC Injections     HISTORY OF PRESENT ILLNESS: Opal Sahu is a 69 y.o. female who presents to me in follow-up for treatment of bilateral knee pain.  At her last visit we discussed performing stem cell injections to the knees.  She returns today for this procedure.  She notes no significant change in her condition since last visit.    Previous HPI  Patient previously treated by Dr. Montoya, who performed steroid injections and Euflexxa injections of the bilateral knees, the most recent of which was a series of 4 Euflexxa injections completed in February '19.     At this visit, patient reports that she is not having a ton of pain, but more aching/discomfort that occurs with long-distance ambulation. Rest/breaks alleviate. She relates the pain to the medial joint line bilaterally. Denies any locking, grinding, clicking of the knee. No swelling. Does not report any recent trauma to the knee or falls. No weakness, paresthesias.    She reports that she has had very good response of several months from previous Euflexxa injections, but she would like to pursue something stronger and is interested in discussing PRP and stem cells. 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 She has previous MRIs of her knee with her performed at an OSH that show degenerative changes (partial medial meniscal tearing bilaterally, tricompartmental OA, baker's cysts).     Review of Systems   Constitutional: Negative for fever.   HENT: Negative for drooling.    Eyes: Negative for discharge.   Respiratory: Negative for choking.    Cardiovascular: Negative for chest pain.   Genitourinary: Negative for flank pain.   Skin: Negative for wound.   Allergic/Immunologic: Negative for immunocompromised state.   Neurological: Negative for tremors and syncope.   Psychiatric/Behavioral: Negative for  behavioral problems.     Past Medical History:   Past Medical History:   Diagnosis Date    Hyperlipidemia     Hypothyroid        Past Surgical History:   Past Surgical History:   Procedure Laterality Date    None         Family History:   Family History   Problem Relation Age of Onset    Heart disease Mother     Cancer Father     Heart disease Brother     Breast cancer Other        Medications:   Current Outpatient Medications on File Prior to Visit   Medication Sig Dispense Refill    aspirin 81 mg Tab Every day      atorvastatin (LIPITOR) 10 MG tablet Take 1 tablet (10 mg total) by mouth once daily. 90 tablet 1    bimatoprost (LATISSE) 0.03 % ophthalmic solution Place one drop on applicator and apply evenly along the skin of the upper eyelid at base of eyelashes once daily at bedtime; repeat procedure for second eye (use a clean applicator). 5 mL 0    diazePAM (VALIUM) 5 MG tablet Take 1 tablet (5 mg total) by mouth once. 1 hour before procedure. May repeat if necessary. for 1 dose 2 tablet 0    mupirocin calcium 2% (BACTROBAN) 2 % cream Apply topically 2 (two) times daily. Apply to woundTwice a day to the wound. 30 g 6    SYNTHROID 50 mcg tablet Take 1 tablet (50 mcg total) by mouth once daily. 90 tablet 3    tazarotene (TAZORAC) 0.05 % gel Apply topically every evening. 100 g 1    valACYclovir (VALTREX) 1000 MG tablet Take 2 tablets (2,000 mg total) by mouth 2 (two) times daily. for 1 day 30 tablet 1     No current facility-administered medications on file prior to visit.        Allergies:   Review of patient's allergies indicates:   Allergen Reactions    Povidone-iodine      Other reaction(s): Unknown       Social History:   Social History     Socioeconomic History    Marital status:      Spouse name: Not on file    Number of children: Not on file    Years of education: Not on file    Highest education level: Not on file   Occupational History     Employer: [employer]  "  Social Needs    Financial resource strain: Not on file    Food insecurity     Worry: Not on file     Inability: Not on file    Transportation needs     Medical: Not on file     Non-medical: Not on file   Tobacco Use    Smoking status: Never Smoker   Substance and Sexual Activity    Alcohol use: Yes    Drug use: No    Sexual activity: Not on file   Lifestyle    Physical activity     Days per week: Not on file     Minutes per session: Not on file    Stress: Not on file   Relationships    Social connections     Talks on phone: Not on file     Gets together: Not on file     Attends Lutheran service: Not on file     Active member of club or organization: Not on file     Attends meetings of clubs or organizations: Not on file     Relationship status: Not on file   Other Topics Concern    Not on file   Social History Narrative    Not on file     PHYSICAL EXAMINATION:   General    Vitals:    07/21/20 0905   Weight: 64.9 kg (143 lb)   Height: 5' 6" (1.676 m)     Constitutional: Oriented to person, place, and time. No apparent distress. Pleasant.  HENT:   Head: Normocephalic and atraumatic.   Eyes: Right eye exhibits no discharge. Left eye exhibits no discharge. No scleral icterus.   Pulmonary/Chest: Effort normal. No respiratory distress.   Abdominal: There is no guarding.   Neurological: Alert and oriented to person, place, and time.   Psychiatric: Behavior is normal.   Right Knee Exam     Tenderness   The patient is experiencing no tenderness.     Range of Motion   Extension: 0   Flexion: 140     Tests   Kaitlin:  Medial - positive Lateral - negative  Varus: negative Valgus: negative  Lachman:  Anterior - negative      Patellar apprehension: negative    Other   Erythema: absent  Scars: absent  Sensation: normal  Pulse: present  Swelling: none  Effusion: no effusion present    Comments:  + crepitus      Left Knee Exam     Tenderness   The patient is experiencing no tenderness.     Range of Motion "   Extension: 0   Flexion: 140     Tests   Kaitlin:  Medial - negative Lateral - negative  Varus: negative Valgus: negative  Lachman:  Anterior - negative      Patellar apprehension: negative    Other   Erythema: absent  Scars: absent  Sensation: normal  Pulse: present  Swelling: none  Effusion: no effusion present        INSPECTION: There is no swelling, ecchymoses, erythema or gross deformity.  GAIT/DYNAMIC: Normal.    Imaging: Bilateral Knee x-rays 6/3/2017  Reviewed. Consistent with moderate medial compartment OA on the right and mild on the left.     Data Reviewed: X-ray    Supportive Actions: Independent visualization of images or test specimens    ASSESSMENT:   1. Primary osteoarthritis of both knees      PLAN:     1. Ms. Sahu wishes to proceed today with bone marrow aspiration and bone marrow aspirate concentrate injection to both knees.  See procedure note below.    2.  I encouraged her to avoid strenuous exercise for the next 2 weeks at least.  She may perform light stretching and low-impact fitness in the meantime.    3.  Will follow up with her in about 4 weeks.  She was encouraged to reach out if any questions or issues arise in the meantime.    Procedure note      Preoperative diagnosis:   Bilateral knee osteoarthritis      Postoperative diagnosis:  Bilateral knee osteoarthritis      Procedure: Ultrasound-assisted aspiration from bone marrow (right PSIS), and ultrasound-guided injection of BMAC into the bilateral knees      Physician: Kieran Shay M.D.      Anesthesia: None      Description of procedure:      Patient was brought to the clinic room. The patient was placed in the lateral decubitus position with the right side up. Using ultrasound the right posterior superior iliac spine was identified. X and Y coordinates were marked on the skin. The posterior pelvis was prepped in the usual sterile fashion using a Betadine solution covering a large area of the posterior pelvis and low back.  Starting with the skin wheal down to the periosteum with a 25-gauge needle, 1% lidocaine was injected for periosteal block. With an #11 blade, a shallow incision was made directly over the skin indentation made earlier. The 11-gauge trocar was then introduced through this incision down to the periosteum. The trocar was advanced gently and received some purchase in the bone using a gentle rotary/to-and-fro movement. The trocar was advanced to approximately 4 cm. The stylet was removed and the 30 cc syringe was connected to the trocar, containing the anticoagulant. Aspiration yielded 30 cc of bone marrow. For each 5 cc of aspirate, the trocar was turned approximately 90° to sample different portions of the cavity. The 30 cc syringe was filled and then switched to a second 30 cc syringe. Using the same technique with turning every 5 cc, another 30 cc was aspirated.  This process was repeated using a total of 4, 30 cc syringes for total volume of 120 cc of bone marrow aspirate. The final syringe was disconnected and the stylette was once again reinserted to the trocar. The trocar was then removed and gentle fashion using the same rotary movements. The total of 120 cc of bone marrow aspirate was handed off for preparation using an Nearpod processing kit and centrifuge. A sterile dressing was applied to the patient.     The patient was placed in a supine position. The patient was prepped in the usual sterile manner. Local anesthesia was provided by ethyl chloride spray to the area in question prior to the procedure. Under ultrasound guidance, see images, the BMAC was injected into the bilateral knee joints, in an in plane lateral to medial approach, 4 cc's in each.  The injectate was observed to course into the joint confirming accurate placement.       Of note, the Nearpod bone marrow aspirate processing kit and centrifuge yielded 3 cc of BMAC. An additional 5 cc of the platelet poor plasma was added to this  syringe for a total of 8cc.      Homegoing care: In case of injection-site soreness, the patient was instructed to apply ice to the area in 20 minutes on/20 minutes off intervals. Strenuous activities should be avoided for 14 days following the procedure. Using the rate of pain resolution as a guide, the patient can resume use of the injected extremity as tolerated. The patient was also started to avoid NSAID use for a period of 14 days at least. They may use Tylenol when necessary.    The above note was completed, in part, with the aid of Dragon dictation software/hardware. Translation errors may be present.

## 2020-07-23 ENCOUNTER — TELEPHONE (OUTPATIENT)
Dept: PHYSICAL MEDICINE AND REHAB | Facility: CLINIC | Age: 70
End: 2020-07-23

## 2020-07-23 DIAGNOSIS — M17.0 PRIMARY OSTEOARTHRITIS OF BOTH KNEES: Primary | ICD-10-CM

## 2020-08-12 ENCOUNTER — CLINICAL SUPPORT (OUTPATIENT)
Dept: PHYSICAL MEDICINE AND REHAB | Facility: CLINIC | Age: 70
End: 2020-08-12
Payer: COMMERCIAL

## 2020-08-12 VITALS — BODY MASS INDEX: 22.98 KG/M2 | WEIGHT: 143 LBS | HEIGHT: 66 IN

## 2020-08-12 DIAGNOSIS — M17.0 PRIMARY OSTEOARTHRITIS OF BOTH KNEES: Primary | ICD-10-CM

## 2020-08-12 PROCEDURE — 99999 PR PBB SHADOW E&M-EST. PATIENT-LVL III: ICD-10-PCS | Mod: PBBFAC,,, | Performed by: PHYSICAL MEDICINE & REHABILITATION

## 2020-08-12 PROCEDURE — 20611 LARGE JOINT ASPIRATION/INJECTION: BILATERAL KNEE: ICD-10-PCS | Mod: 50,S$GLB,, | Performed by: PHYSICAL MEDICINE & REHABILITATION

## 2020-08-12 PROCEDURE — 20611 DRAIN/INJ JOINT/BURSA W/US: CPT | Mod: 50,S$GLB,, | Performed by: PHYSICAL MEDICINE & REHABILITATION

## 2020-08-12 PROCEDURE — 99999 PR PBB SHADOW E&M-EST. PATIENT-LVL III: CPT | Mod: PBBFAC,,, | Performed by: PHYSICAL MEDICINE & REHABILITATION

## 2020-08-12 NOTE — PROGRESS NOTES
[facility]    CHIEF COMPLAINT:   Chief Complaint   Patient presents with    Injections     Euflexxa - Bilateral Knees     HISTORY OF PRESENT ILLNESS: Opal Sahu is a 69 y.o. female who returns today for her bilateral knee osteoarthritis. Most recently seen on 7/21/2020 where she underwent bilateral knee stem cell injection. No significant relief quite yet. She is here today for scheduled Euflexxa injections as previously discussed.     Review of Systems   Constitutional: Negative for fever.   HENT: Negative for drooling.    Eyes: Negative for discharge.   Respiratory: Negative for choking.    Cardiovascular: Negative for chest pain.   Genitourinary: Negative for flank pain.   Skin: Negative for wound.   Allergic/Immunologic: Negative for immunocompromised state.   Neurological: Negative for tremors and syncope.   Psychiatric/Behavioral: Negative for behavioral problems.     Past Medical History:   Past Medical History:   Diagnosis Date    Hyperlipidemia     Hypothyroid        Past Surgical History:   Past Surgical History:   Procedure Laterality Date    None         Family History:   Family History   Problem Relation Age of Onset    Heart disease Mother     Cancer Father     Heart disease Brother     Breast cancer Other        Medications:   Current Outpatient Medications on File Prior to Visit   Medication Sig Dispense Refill    aspirin 81 mg Tab Every day      atorvastatin (LIPITOR) 10 MG tablet Take 1 tablet (10 mg total) by mouth once daily. 90 tablet 1    bimatoprost (LATISSE) 0.03 % ophthalmic solution Place one drop on applicator and apply evenly along the skin of the upper eyelid at base of eyelashes once daily at bedtime; repeat procedure for second eye (use a clean applicator). 5 mL 0    diazePAM (VALIUM) 5 MG tablet Take 1 tablet (5 mg total) by mouth once. 1 hour before procedure. May repeat if necessary. for 1 dose 2 tablet 0    mupirocin calcium 2% (BACTROBAN) 2  % cream Apply topically 2 (two) times daily. Apply to woundTwice a day to the wound. 30 g 6    SYNTHROID 50 mcg tablet Take 1 tablet (50 mcg total) by mouth once daily. 90 tablet 3    tazarotene (TAZORAC) 0.05 % gel Apply topically every evening. 100 g 1    valACYclovir (VALTREX) 1000 MG tablet Take 2 tablets (2,000 mg total) by mouth 2 (two) times daily. for 1 day 30 tablet 1     Current Facility-Administered Medications on File Prior to Visit   Medication Dose Route Frequency Provider Last Rate Last Dose    hyaluronate sodium, stabilized (MONOVISC) Syrg 1 Dose  1 Dose Intra-articular 1 time in Clinic/HOD Alexandra Rodriguez MD        sodium hyaluronate (EUFLEXXA) 10 mg/mL(mw 2.4 -3.6 million) injection 20 mg  20 mg Intra-articular 1 time in Clinic/HOD Kieran Shay MD           Allergies:   Review of patient's allergies indicates:   Allergen Reactions    Povidone-iodine      Other reaction(s): Unknown       Social History:   Social History     Socioeconomic History    Marital status:      Spouse name: Not on file    Number of children: Not on file    Years of education: Not on file    Highest education level: Not on file   Occupational History     Employer: [employer]   Social Needs    Financial resource strain: Not on file    Food insecurity     Worry: Not on file     Inability: Not on file    Transportation needs     Medical: Not on file     Non-medical: Not on file   Tobacco Use    Smoking status: Never Smoker   Substance and Sexual Activity    Alcohol use: Yes    Drug use: No    Sexual activity: Not on file   Lifestyle    Physical activity     Days per week: Not on file     Minutes per session: Not on file    Stress: Not on file   Relationships    Social connections     Talks on phone: Not on file     Gets together: Not on file     Attends Taoist service: Not on file     Active member of club or organization: Not on file     Attends meetings of clubs or  "organizations: Not on file     Relationship status: Not on file   Other Topics Concern    Not on file   Social History Narrative    Not on file     PHYSICAL EXAMINATION:   General    Vitals:    08/12/20 1015   Weight: 64.9 kg (143 lb)   Height: 5' 6" (1.676 m)     Constitutional: Oriented to person, place, and time. No apparent distress. Pleasant.  HENT:   Head: Normocephalic and atraumatic.   Eyes: Right eye exhibits no discharge. Left eye exhibits no discharge. No scleral icterus.   Pulmonary/Chest: Effort normal. No respiratory distress.   Abdominal: There is no guarding.   Neurological: Alert and oriented to person, place, and time.   Psychiatric: Behavior is normal.   Right Knee Exam     Tenderness   The patient is experiencing no tenderness.     Range of Motion   Extension: 0   Flexion: 140     Tests   Kaitlin:  Medial - positive Lateral - negative  Varus: negative Valgus: negative  Lachman:  Anterior - negative      Patellar apprehension: negative    Other   Erythema: absent  Scars: absent  Sensation: normal  Pulse: present  Swelling: none  Effusion: no effusion present    Comments:  + crepitus      Left Knee Exam     Tenderness   The patient is experiencing no tenderness.     Range of Motion   Extension: 0   Flexion: 140     Tests   Kaitlin:  Medial - negative Lateral - negative  Varus: negative Valgus: negative  Lachman:  Anterior - negative      Patellar apprehension: negative    Other   Erythema: absent  Scars: absent  Sensation: normal  Pulse: present  Swelling: none  Effusion: no effusion present        INSPECTION: There is no swelling, ecchymoses, erythema or gross deformity of the knees.  GAIT/DYNAMIC: Normal.    Imaging: Bilateral Knee x-rays 6/3/2017  Reviewed. Consistent with moderate medial compartment OA on the right and mild on the left.     Data Reviewed: X-ray    Supportive Actions: Independent visualization of images or test specimens    ASSESSMENT:   1. Primary osteoarthritis of both " knees      PLAN:     1. We proceeded today with bilateral knee Euflexxa injections under ultrasound guidance. See separate procedure note.     2. RTC in 1 week for b/l knee injections, #'s 2 & 3.    The above note was completed, in part, with the aid of Dragon dictation software/hardware. Translation errors may be present.

## 2020-08-13 ENCOUNTER — HOSPITAL ENCOUNTER (OUTPATIENT)
Dept: RADIOLOGY | Facility: HOSPITAL | Age: 70
Discharge: HOME | End: 2020-08-13
Attending: INTERNAL MEDICINE
Payer: COMMERCIAL

## 2020-08-13 ENCOUNTER — HOSPITAL ENCOUNTER (OUTPATIENT)
Dept: RADIOLOGY | Facility: HOSPITAL | Age: 70
Discharge: HOME | End: 2020-08-13
Attending: FAMILY MEDICINE
Payer: COMMERCIAL

## 2020-08-13 ENCOUNTER — TELEPHONE (OUTPATIENT)
Dept: FAMILY MEDICINE | Facility: CLINIC | Age: 70
End: 2020-08-13

## 2020-08-13 DIAGNOSIS — E03.4 HYPOTHYROIDISM DUE TO ACQUIRED ATROPHY OF THYROID: ICD-10-CM

## 2020-08-13 DIAGNOSIS — Z12.31 BREAST CANCER SCREENING BY MAMMOGRAM: ICD-10-CM

## 2020-08-13 DIAGNOSIS — E04.1 THYROID NODULE: ICD-10-CM

## 2020-08-13 DIAGNOSIS — Z12.39 SCREENING FOR BREAST CANCER: ICD-10-CM

## 2020-08-13 PROCEDURE — 77063 MAMMO DIGITAL SCREENING BILAT WITH TOMOSYNTHESIS_CAD: ICD-10-PCS | Mod: 26,,, | Performed by: RADIOLOGY

## 2020-08-13 PROCEDURE — 77067 SCR MAMMO BI INCL CAD: CPT | Mod: 26,,, | Performed by: RADIOLOGY

## 2020-08-13 PROCEDURE — 76536 US SOFT TISSUE HEAD NECK THYROID: ICD-10-PCS | Mod: 26,,, | Performed by: RADIOLOGY

## 2020-08-13 PROCEDURE — 76536 US EXAM OF HEAD AND NECK: CPT | Mod: TC,PO

## 2020-08-13 PROCEDURE — 77067 MAMMO DIGITAL SCREENING BILAT WITH TOMOSYNTHESIS_CAD: ICD-10-PCS | Mod: 26,,, | Performed by: RADIOLOGY

## 2020-08-13 PROCEDURE — 76536 US EXAM OF HEAD AND NECK: CPT | Mod: 26,,, | Performed by: RADIOLOGY

## 2020-08-13 PROCEDURE — 77067 SCR MAMMO BI INCL CAD: CPT | Mod: TC,PO

## 2020-08-13 PROCEDURE — 77063 BREAST TOMOSYNTHESIS BI: CPT | Mod: 26,,, | Performed by: RADIOLOGY

## 2020-08-13 NOTE — TELEPHONE ENCOUNTER
----- Message from Alexandra Stevenson sent at 8/13/2020  4:23 PM CDT -----  Regarding: Pt would like to reschedule to 8/19/2020 if possible  Pt would like to reschedule to a earlier appt for 8/19/2020 if possible    Please call :  [phone]        Thank You   Alexandra Stevenson

## 2020-08-13 NOTE — PROCEDURES
Large Joint Aspiration/Injection: bilateral knee    Date/Time: 8/12/2020 9:30 AM  Performed by: Kieran Shay MD  Authorized by: Kieran Shay MD     Consent Done?:  Yes (Verbal)  Indications:  Pain and arthritis  Site marked: the procedure site was marked    Timeout: prior to procedure the correct patient, procedure, and site was verified    Prep: patient was prepped and draped in usual sterile fashion    Local anesthesia used?: No      Details:  Needle Size:  22 G  Ultrasonic Guidance for needle placement?: Yes    Images are saved and documented.  Approach: Needle in plane, lateral to medial.  Location:  Knee  Laterality:  Bilateral  Site:  Bilateral knee  Medications (Right):  20 mg sodium hyaluronate (EUFLEXXA) 10 mg/mL(mw 2.4 -3.6 million)  Medications (Left):  20 mg sodium hyaluronate (EUFLEXXA) 10 mg/mL(mw 2.4 -3.6 million)  Patient tolerance:  Patient tolerated the procedure well with no immediate complications     Ultrasound guidance was used for correct needle placement, the images were saved will be uploaded to EMR.

## 2020-08-17 ENCOUNTER — TELEPHONE (OUTPATIENT)
Dept: FAMILY MEDICINE | Facility: CLINIC | Age: 70
End: 2020-08-17

## 2020-08-17 ENCOUNTER — TELEPHONE (OUTPATIENT)
Dept: PHYSICAL MEDICINE AND REHAB | Facility: CLINIC | Age: 70
End: 2020-08-17

## 2020-08-17 NOTE — TELEPHONE ENCOUNTER
----- Message from Corine Caceres sent at 8/17/2020 12:48 PM CDT -----  Type: Needs Medical Advice  Who Called:  Patient   Best Call Back Number:   Additional Information: Per patient requesting a call back regarding a letter she received about her injection-please advise-thank you

## 2020-08-17 NOTE — TELEPHONE ENCOUNTER
----- Message from Corine Caceres sent at 8/17/2020 12:50 PM CDT -----  Type: Needs Medical Advice  Who Called:  Patient   Best Call Back Number: [phone]   Additional Information: Per patient requesting a call back regarding results, would like to discuss them in greater detail-please advise-thank you

## 2020-08-18 NOTE — TELEPHONE ENCOUNTER
Patient notified euflexxa approved was wondering if can do the monovisc since it is also approved and is only one injection.informed will do both euflexxa #2 & #3 tomorrow. Patient verbalized understanding.

## 2020-08-19 ENCOUNTER — CLINICAL SUPPORT (OUTPATIENT)
Dept: PHYSICAL MEDICINE AND REHAB | Facility: CLINIC | Age: 70
End: 2020-08-19
Payer: COMMERCIAL

## 2020-08-19 ENCOUNTER — TELEPHONE (OUTPATIENT)
Dept: FAMILY MEDICINE | Facility: CLINIC | Age: 70
End: 2020-08-19

## 2020-08-19 VITALS — HEIGHT: 66 IN | WEIGHT: 143.06 LBS | BODY MASS INDEX: 22.99 KG/M2

## 2020-08-19 DIAGNOSIS — M17.0 PRIMARY OSTEOARTHRITIS OF BOTH KNEES: Primary | ICD-10-CM

## 2020-08-19 PROCEDURE — 99499 NO LOS: ICD-10-PCS | Mod: S$GLB,,, | Performed by: PHYSICAL MEDICINE & REHABILITATION

## 2020-08-19 PROCEDURE — 99999 PR PBB SHADOW E&M-EST. PATIENT-LVL III: CPT | Mod: PBBFAC,,, | Performed by: PHYSICAL MEDICINE & REHABILITATION

## 2020-08-19 PROCEDURE — 20611 DRAIN/INJ JOINT/BURSA W/US: CPT | Mod: 50,S$GLB,, | Performed by: PHYSICAL MEDICINE & REHABILITATION

## 2020-08-19 PROCEDURE — 99999 PR PBB SHADOW E&M-EST. PATIENT-LVL III: ICD-10-PCS | Mod: PBBFAC,,, | Performed by: PHYSICAL MEDICINE & REHABILITATION

## 2020-08-19 PROCEDURE — 99499 UNLISTED E&M SERVICE: CPT | Mod: S$GLB,,, | Performed by: PHYSICAL MEDICINE & REHABILITATION

## 2020-08-19 PROCEDURE — 20611 LARGE JOINT ASPIRATION/INJECTION: BILATERAL KNEE: ICD-10-PCS | Mod: 50,S$GLB,, | Performed by: PHYSICAL MEDICINE & REHABILITATION

## 2020-08-19 NOTE — TELEPHONE ENCOUNTER
----- Message from Ju Bennett sent at 8/19/2020  4:06 PM CDT -----  Regarding: results  Contact: pt [phone]  Who Called:  Patient   Name of Test (Lab/Mammo/Etc):  Labwork/mammo/thyroid  Date of Test:  August 13th  Ordering Provider:  Dr. Bailey  Where the test was performed:  [facility] Call Back Number:  [phone]

## 2020-08-19 NOTE — PROGRESS NOTES
[facility]    CHIEF COMPLAINT:   Bilateral knee pain    HISTORY OF PRESENT ILLNESS:  Opal Sahu is a 69 y.o. female who returns today for her bilateral knee osteoarthritis. Most recently seen on 8/12/2020 where she had her first of the Euflexxa injections. No significant relief quite yet. She is here today for scheduled Euflexxa injections as previously discussed.     Review of Systems   Constitutional: Negative for fever.   HENT: Negative for drooling.    Eyes: Negative for discharge.   Respiratory: Negative for choking.    Cardiovascular: Negative for chest pain.   Genitourinary: Negative for flank pain.   Skin: Negative for wound.   Allergic/Immunologic: Negative for immunocompromised state.   Neurological: Negative for tremors and syncope.   Psychiatric/Behavioral: Negative for behavioral problems.     Past Medical History:   Past Medical History:   Diagnosis Date    Hyperlipidemia     Hypothyroid        Past Surgical History:   Past Surgical History:   Procedure Laterality Date    None         Family History:   Family History   Problem Relation Age of Onset    Heart disease Mother     Cancer Father     Heart disease Brother     Breast cancer Other        Medications:   Current Outpatient Medications on File Prior to Visit   Medication Sig Dispense Refill    aspirin 81 mg Tab Every day      atorvastatin (LIPITOR) 10 MG tablet Take 1 tablet (10 mg total) by mouth once daily. 90 tablet 1    bimatoprost (LATISSE) 0.03 % ophthalmic solution Place one drop on applicator and apply evenly along the skin of the upper eyelid at base of eyelashes once daily at bedtime; repeat procedure for second eye (use a clean applicator). 5 mL 0    mupirocin calcium 2% (BACTROBAN) 2 % cream Apply topically 2 (two) times daily. Apply to woundTwice a day to the wound. 30 g 6    SYNTHROID 50 mcg tablet Take 1 tablet (50 mcg total) by mouth once daily. 90 tablet 3    tazarotene (TAZORAC) 0.05 % gel  Apply topically every evening. 100 g 1    diazePAM (VALIUM) 5 MG tablet Take 1 tablet (5 mg total) by mouth once. 1 hour before procedure. May repeat if necessary. for 1 dose 2 tablet 0    valACYclovir (VALTREX) 1000 MG tablet Take 2 tablets (2,000 mg total) by mouth 2 (two) times daily. for 1 day 30 tablet 1     Current Facility-Administered Medications on File Prior to Visit   Medication Dose Route Frequency Provider Last Rate Last Dose    hyaluronate sodium, stabilized (MONOVISC) Syrg 1 Dose  1 Dose Intra-articular 1 time in Clinic/HOD Alexandra Rodriguez MD        sodium hyaluronate (EUFLEXXA) 10 mg/mL(mw 2.4 -3.6 million) injection 20 mg  20 mg Intra-articular 1 time in Clinic/HOD Kieran Shay MD           Allergies:   Review of patient's allergies indicates:   Allergen Reactions    Povidone-iodine      Other reaction(s): Unknown       Social History:   Social History     Socioeconomic History    Marital status:      Spouse name: Not on file    Number of children: Not on file    Years of education: Not on file    Highest education level: Not on file   Occupational History     Employer: [employer]   Social Needs    Financial resource strain: Not on file    Food insecurity     Worry: Not on file     Inability: Not on file    Transportation needs     Medical: Not on file     Non-medical: Not on file   Tobacco Use    Smoking status: Never Smoker   Substance and Sexual Activity    Alcohol use: Yes    Drug use: No    Sexual activity: Not on file   Lifestyle    Physical activity     Days per week: Not on file     Minutes per session: Not on file    Stress: Not on file   Relationships    Social connections     Talks on phone: Not on file     Gets together: Not on file     Attends Pentecostal service: Not on file     Active member of club or organization: Not on file     Attends meetings of clubs or organizations: Not on file     Relationship status: Not on file   Other  "Topics Concern    Not on file   Social History Narrative    Not on file     PHYSICAL EXAMINATION:   General  Ht 5' 6" (1.676 m)   Wt 64.9 kg (143 lb 1.3 oz)   BMI 23.09 kg/m²   Constitutional: Oriented to person, place, and time. No apparent distress. Pleasant.  HENT:   Head: Normocephalic and atraumatic.   Eyes: Right eye exhibits no discharge. Left eye exhibits no discharge. No scleral icterus.   Pulmonary/Chest: Effort normal. No respiratory distress.   Abdominal: There is no guarding.   Neurological: Alert and oriented to person, place, and time.   Psychiatric: Behavior is normal.   Right Knee Exam     Tenderness   The patient is experiencing no tenderness.     Range of Motion   Extension: 0   Flexion: 140     Tests   Kaitlin:  Medial - positive Lateral - negative  Varus: negative Valgus: negative  Lachman:  Anterior - negative      Patellar apprehension: negative    Other   Erythema: absent  Scars: absent  Sensation: normal  Pulse: present  Swelling: none  Effusion: no effusion present    Comments:  + crepitus      Left Knee Exam     Tenderness   The patient is experiencing no tenderness.     Range of Motion   Extension: 0   Flexion: 140     Tests   Kaitlin:  Medial - negative Lateral - negative  Varus: negative Valgus: negative  Lachman:  Anterior - negative      Patellar apprehension: negative    Other   Erythema: absent  Scars: absent  Sensation: normal  Pulse: present  Swelling: none  Effusion: no effusion present        INSPECTION: There is no swelling, ecchymoses, erythema or gross deformity of the knees.  GAIT/DYNAMIC: Normal.    Imaging: Bilateral Knee x-rays 6/3/2017  Reviewed. Consistent with moderate medial compartment OA on the right and mild on the left.     Data Reviewed: X-ray    Supportive Actions: Independent visualization of images or test specimens    ASSESSMENT:   1. Primary osteoarthritis of both knees      PLAN:     1. We proceeded today with bilateral knee Euflexxa injections under " ultrasound guidance, #'s 2 & 3. See separate procedure note.     2.  We will plan to reach out to her in the next 3-4 weeks to assess her progress in regards to the Euflexxa series and the stem cell injections.  She was encouraged to reach out if any questions or issues arise.    The above note was completed, in part, with the aid of Dragon dictation software/hardware. Translation errors may be present.

## 2020-08-20 NOTE — PROCEDURES
Large Joint Aspiration/Injection: bilateral knee    Date/Time: 8/19/2020 10:00 AM  Performed by: iKeran Shay MD  Authorized by: Kieran Shay MD     Consent Done?:  Yes (Verbal)  Indications:  Arthritis and pain  Site marked: the procedure site was marked    Timeout: prior to procedure the correct patient, procedure, and site was verified    Prep: patient was prepped and draped in usual sterile fashion    Local anesthesia used?: No      Details:  Needle Size:  22 G  Ultrasonic Guidance for needle placement?: Yes    Images are saved and documented.  Approach: Needle in plane, lateral to medial.  Location:  Knee  Laterality:  Bilateral  Site:  Bilateral knee  Medications (Right):  20 mg sodium hyaluronate (EUFLEXXA) 10 mg/mL(mw 2.4 -3.6 million)  Medications (Left):  20 mg sodium hyaluronate (EUFLEXXA) 10 mg/mL(mw 2.4 -3.6 million)  Patient tolerance:  Patient tolerated the procedure well with no immediate complications     Ultrasound guidance was used for correct needle placement, the images were saved will be uploaded to EMR.

## 2020-08-28 ENCOUNTER — TELEPHONE (OUTPATIENT)
Dept: FAMILY MEDICINE | Facility: CLINIC | Age: 70
End: 2020-08-28

## 2020-08-28 NOTE — TELEPHONE ENCOUNTER
No need for lipid before f/u.  Dr. Cade ordered but from my interpretation it is unchanged which is good.

## 2020-08-28 NOTE — TELEPHONE ENCOUNTER
Spoke to pt and advised pt of results. Pt verbalized understanding and would like to know if she should repeat the lipid panel in December and would also like the results from her thyroid US. Please advise.

## 2020-08-28 NOTE — TELEPHONE ENCOUNTER
----- Message from Lluvia Alvarez sent at 8/28/2020  1:57 PM CDT -----  Contact: pt  Type:  Patient Returning Call    Who Called:  PT  Who Left Message for Patient:  nurse  Does the patient know what this is regarding?:  test results  Best Call Back Number:  [phone]  Additional Information:  Please Advise ---Thank you

## 2020-09-26 DIAGNOSIS — E78.00 HYPERCHOLESTEREMIA: ICD-10-CM

## 2020-09-26 NOTE — TELEPHONE ENCOUNTER
No new care gaps identified.  Powered by [employer]. Reference number: [id number]. 9/26/2020 9:47:23 AM JONT

## 2020-09-28 RX ORDER — ATORVASTATIN CALCIUM 10 MG/1
TABLET, FILM COATED ORAL
Qty: 90 TABLET | Refills: 0 | Status: SHIPPED | OUTPATIENT
Start: 2020-09-28 | End: 2021-03-29

## 2020-09-28 NOTE — PROGRESS NOTES
Refill Authorization Note   Opal Sahu is requesting a refill authorization.     Brief assessment and rationale for refill: APPROVE: prr          Medication Therapy Plan: CDMR; FOVS    Medication reconciliation completed: No                    Comments:      Orders Placed This Encounter    atorvastatin (LIPITOR) 10 MG tablet      Requested Prescriptions   Signed Prescriptions Disp Refills    atorvastatin (LIPITOR) 10 MG tablet 90 tablet 0     Sig: TAKE ONE TABLET BY MOUTH ONCE DAILY       Cardiovascular:  Antilipid - Statins Passed - 9/26/2020  9:46 AM        Passed - Patient is at least 18 years old        Passed - Office Visit within last 12 months or future 90 days.     Recent Outpatient Visits            2 months ago Primary osteoarthritis of both knees    [place] [facility] Kieran Shay MD    3 months ago Primary osteoarthritis of both knees    [facility] Kieran Shay MD    11 months ago Wellness examination    [facility] JOLENE Bailey MD    1 year ago Primary osteoarthritis of right knee    [facility] Jorge Montoya [facility]    1 year ago Osteoarthritis of both knees, unspecified osteoarthritis type    [facility] Jorge Montoya MD          Future Appointments              In 1 week JOLENE Bailey MD [facility]                Passed - ALT is 94 or below and within 360 days     ALT   Date Value Ref Range Status   08/13/2020 13 10 - 44 U/L Final   08/17/2019 15 10 - 44 U/L Final   08/11/2018 16 10 - 44 U/L Final              Passed - AST is 54 or below and within 360 days     AST   Date Value Ref Range Status   08/13/2020 13 10 - 40 U/L Final   08/17/2019 15 10 - 40 U/L Final   08/11/2018 14 10 - 40 U/L Final              Passed - Total Cholesterol within 360 days     Cholesterol   Date Value Ref Range Status   08/13/2020 214 (H) 120 - 199 mg/dL Final     Comment:      The National Cholesterol Education Program (NCEP) has set the  following guidelines (reference ranges) for Cholesterol:  Optimal.....................<200 mg/dL  Borderline High.............200-239 mg/dL  High........................> or = 240 mg/dL     08/17/2019 182 120 - 199 mg/dL Final     Comment:     The National Cholesterol Education Program (NCEP) has set the  following guidelines (reference ranges) for Cholesterol:  Optimal.....................<200 mg/dL  Borderline High.............200-239 mg/dL  High........................> or = 240 mg/dL     08/11/2018 200 (H) 120 - 199 mg/dL Final     Comment:     The National Cholesterol Education Program (NCEP) has set the  following guidelines (reference ranges) for Cholesterol:  Optimal.....................<200 mg/dL  Borderline High.............200-239 mg/dL  High........................> or = 240 mg/dL                Passed - LDL within 360 days     LDL Cholesterol   Date Value Ref Range Status   08/13/2020 128.2 63.0 - 159.0 mg/dL Final     Comment:     The National Cholesterol Education Program (NCEP) has set the  following guidelines (reference values) for LDL Cholesterol:  Optimal.......................<130 mg/dL  Borderline High...............130-159 mg/dL  High..........................160-189 mg/dL  Very High.....................>190 mg/dL              Passed - HDL within 360 days     HDL   Date Value Ref Range Status   08/13/2020 66 40 - 75 mg/dL Final     Comment:     The National Cholesterol Education Program (NCEP) has set the  following guidelines (reference values) for HDL Cholesterol:  Low...............<40 mg/dL  Optimal...........>60 mg/dL              Passed - Triglycerides within 360 days     Triglycerides   Date Value Ref Range Status   08/13/2020 99 30 - 150 mg/dL Final     Comment:     The National Cholesterol Education Program (NCEP) has set the  following guidelines (reference values) for triglycerides:  Normal......................<150  mg/dL  Borderline High.............150-199 mg/dL  High........................200-499 mg/dL     08/17/2019 63 30 - 150 mg/dL Final     Comment:     The National Cholesterol Education Program (NCEP) has set the  following guidelines (reference values) for triglycerides:  Normal......................<150 mg/dL  Borderline High.............150-199 mg/dL  High........................200-499 mg/dL     08/11/2018 84 30 - 150 mg/dL Final     Comment:     The National Cholesterol Education Program (NCEP) has set the  following guidelines (reference values) for triglycerides:  Normal......................<150 mg/dL  Borderline High.............150-199 mg/dL  High........................200-499 mg/dL                    Appointments  past 12m or future 3m with PCP    Date Provider   Last Visit   10/7/2019 JOLENE Bailey MD   Next Visit   10/8/2020 JOLENE Bailey MD   ED visits in past 90 days: 0     Note composed:2:50 PM 09/28/2020

## 2020-10-08 ENCOUNTER — OFFICE VISIT (OUTPATIENT)
Dept: FAMILY MEDICINE | Facility: CLINIC | Age: 70
End: 2020-10-08
Payer: COMMERCIAL

## 2020-10-08 VITALS
WEIGHT: 143.06 LBS | HEIGHT: 66 IN | HEART RATE: 85 BPM | TEMPERATURE: 98 F | SYSTOLIC BLOOD PRESSURE: 110 MMHG | DIASTOLIC BLOOD PRESSURE: 80 MMHG | OXYGEN SATURATION: 96 % | BODY MASS INDEX: 22.99 KG/M2

## 2020-10-08 DIAGNOSIS — E03.9 HYPOTHYROIDISM, UNSPECIFIED TYPE: Chronic | ICD-10-CM

## 2020-10-08 DIAGNOSIS — Z78.0 POSTMENOPAUSAL: ICD-10-CM

## 2020-10-08 DIAGNOSIS — Z00.00 WELLNESS EXAMINATION: Primary | ICD-10-CM

## 2020-10-08 DIAGNOSIS — Z13.820 SCREENING FOR OSTEOPOROSIS: ICD-10-CM

## 2020-10-08 DIAGNOSIS — E78.00 HYPERCHOLESTEREMIA: Chronic | ICD-10-CM

## 2020-10-08 PROCEDURE — 99397 PER PM REEVAL EST PAT 65+ YR: CPT | Mod: 25,S$GLB,, | Performed by: FAMILY MEDICINE

## 2020-10-08 PROCEDURE — 3079F DIAST BP 80-89 MM HG: CPT | Mod: CPTII,S$GLB,, | Performed by: FAMILY MEDICINE

## 2020-10-08 PROCEDURE — 90471 FLU VACCINE - QUADRIVALENT - ADJUVANTED: ICD-10-PCS | Mod: S$GLB,,, | Performed by: FAMILY MEDICINE

## 2020-10-08 PROCEDURE — 90471 IMMUNIZATION ADMIN: CPT | Mod: S$GLB,,, | Performed by: FAMILY MEDICINE

## 2020-10-08 PROCEDURE — 3074F SYST BP LT 130 MM HG: CPT | Mod: CPTII,S$GLB,, | Performed by: FAMILY MEDICINE

## 2020-10-08 PROCEDURE — 90694 FLU VACCINE - QUADRIVALENT - ADJUVANTED: ICD-10-PCS | Mod: S$GLB,,, | Performed by: FAMILY MEDICINE

## 2020-10-08 PROCEDURE — 99999 PR PBB SHADOW E&M-EST. PATIENT-LVL IV: CPT | Mod: PBBFAC,,, | Performed by: FAMILY MEDICINE

## 2020-10-08 PROCEDURE — 99397 PR PREVENTIVE VISIT,EST,65 & OVER: ICD-10-PCS | Mod: 25,S$GLB,, | Performed by: FAMILY MEDICINE

## 2020-10-08 PROCEDURE — 90694 VACC AIIV4 NO PRSRV 0.5ML IM: CPT | Mod: S$GLB,,, | Performed by: FAMILY MEDICINE

## 2020-10-08 PROCEDURE — 3074F PR MOST RECENT SYSTOLIC BLOOD PRESSURE < 130 MM HG: ICD-10-PCS | Mod: CPTII,S$GLB,, | Performed by: FAMILY MEDICINE

## 2020-10-08 PROCEDURE — 3079F PR MOST RECENT DIASTOLIC BLOOD PRESSURE 80-89 MM HG: ICD-10-PCS | Mod: CPTII,S$GLB,, | Performed by: FAMILY MEDICINE

## 2020-10-08 PROCEDURE — 99999 PR PBB SHADOW E&M-EST. PATIENT-LVL IV: ICD-10-PCS | Mod: PBBFAC,,, | Performed by: FAMILY MEDICINE

## 2020-10-08 RX ORDER — BIMATOPROST 3 UG/ML
SOLUTION TOPICAL
Qty: 5 ML | Refills: 1 | Status: SHIPPED | OUTPATIENT
Start: 2020-10-08 | End: 2021-11-15 | Stop reason: SDUPTHER

## 2020-10-08 RX ORDER — VALACYCLOVIR HYDROCHLORIDE 1 G/1
2000 TABLET, FILM COATED ORAL 2 TIMES DAILY
Qty: 30 TABLET | Refills: 1 | Status: SHIPPED | OUTPATIENT
Start: 2020-10-08 | End: 2022-05-30 | Stop reason: SDUPTHER

## 2020-10-08 RX ORDER — TRETINOIN 1 MG/G
CREAM TOPICAL NIGHTLY
Qty: 45 G | Refills: 1 | Status: SHIPPED | OUTPATIENT
Start: 2020-10-08 | End: 2022-05-30 | Stop reason: SDUPTHER

## 2020-10-08 RX ORDER — MUPIROCIN CALCIUM 20 MG/G
CREAM TOPICAL 2 TIMES DAILY
Qty: 30 G | Refills: 6 | Status: SHIPPED | OUTPATIENT
Start: 2020-10-08 | End: 2022-05-30 | Stop reason: SDUPTHER

## 2020-10-08 NOTE — PATIENT INSTRUCTIONS
"Fish oil "burpless" daily  Osteo biflex daily  Aleve nightly for next week  Calcium/vit d supplement  "

## 2020-10-08 NOTE — PROGRESS NOTES
Subjective:       Patient ID: Opal Sahu is a 70 y.o. female.    Chief Complaint: Annual Exam    Here for wellness and f/u chronic medical issues. Doing well overall.     Hyperlipidemia  This is a chronic problem. The current episode started more than 1 year ago. The problem is controlled. Pertinent negatives include no chest pain or shortness of breath.     Review of Systems   Constitutional: Negative for chills and fever.   Respiratory: Negative for cough, chest tightness and shortness of breath.    Cardiovascular: Negative for chest pain, palpitations and leg swelling.   Endocrine: Negative for cold intolerance and heat intolerance.   Psychiatric/Behavioral: Negative for decreased concentration. The patient is not nervous/anxious.        Objective:      Physical Exam  Vitals signs and nursing note reviewed.   Constitutional:       Appearance: She is well-developed.   HENT:      Head: Normocephalic and atraumatic.   Cardiovascular:      Rate and Rhythm: Normal rate and regular rhythm.      Heart sounds: Normal heart sounds.   Pulmonary:      Effort: Pulmonary effort is normal.      Breath sounds: Normal breath sounds.         Assessment:       1. Wellness examination    2. Hypercholesteremia    3. Hypothyroidism, unspecified type    4. Screening for osteoporosis    5. Postmenopausal        Plan:       Wellness examination  -     CBC auto differential; Future; Expected date: 10/08/2020  -     Comprehensive Metabolic Panel; Future; Expected date: 10/08/2020  -     Lipid Panel; Future; Expected date: 10/08/2020  -     TSH; Future; Expected date: 10/08/2020    Hypercholesteremia  -     CBC auto differential; Future; Expected date: 10/08/2020  -     Comprehensive Metabolic Panel; Future; Expected date: 10/08/2020  -     Lipid Panel; Future; Expected date: 10/08/2020  -     TSH; Future; Expected date: 10/08/2020    Hypothyroidism, unspecified type  -     CBC auto differential; Future; Expected date: 10/08/2020  -      Comprehensive Metabolic Panel; Future; Expected date: 10/08/2020  -     Lipid Panel; Future; Expected date: 10/08/2020  -     TSH; Future; Expected date: 10/08/2020    Screening for osteoporosis  -     DXA Bone Density Spine And Hip; Future; Expected date: 10/08/2020    Postmenopausal  -     DXA Bone Density Spine And Hip; Future; Expected date: 10/08/2020    Other orders  -     Influenza - Quadrivalent (Adjuvanted)  -     mupirocin calcium 2% (BACTROBAN) 2 % cream; Apply topically 2 (two) times daily. Apply to woundTwice a day to the wound.  Dispense: 30 g; Refill: 6  -     bimatoprost (LATISSE) 0.03 % ophthalmic solution; Place one drop on applicator and apply evenly along the skin of the upper eyelid at base of eyelashes once daily at bedtime; repeat procedure for second eye (use a clean applicator).  Dispense: 5 mL; Refill: 1  -     valACYclovir (VALTREX) 1000 MG tablet; Take 2 tablets (2,000 mg total) by mouth 2 (two) times daily. for 1 day  Dispense: 30 tablet; Refill: 1  -     tretinoin (RETIN-A) 0.1 % cream; Apply topically every evening.  Dispense: 45 g; Refill: 1        Reviewed recent labs  Diet/exercise and wt loss  To check pharmacy as she feels she had all pneumonia vaccines  Will monitor chronic medical issues and continue current plan of care.      Follow up in about 6 months (around 4/8/2021), or if symptoms worsen or fail to improve.

## 2020-10-09 ENCOUNTER — TELEPHONE (OUTPATIENT)
Dept: FAMILY MEDICINE | Facility: CLINIC | Age: 70
End: 2020-10-09

## 2020-10-09 RX ORDER — MUPIROCIN 20 MG/G
OINTMENT TOPICAL 2 TIMES DAILY
Qty: 22 G | Refills: 1 | Status: SHIPPED | OUTPATIENT
Start: 2020-10-09 | End: 2023-08-07 | Stop reason: SDUPTHER

## 2020-10-09 NOTE — TELEPHONE ENCOUNTER
[facility] pharmacy would like to know if mupirocin calcium 2% (BACTROBAN) 2 % cream can be changed to ointment. Please advise.

## 2020-10-09 NOTE — TELEPHONE ENCOUNTER
----- Message from [facility] sent at 10/9/2020 10:27 AM CDT -----  [facility] pharmacy called asking can the mupirocin calcium 2% (BACTROBAN) 2 % cream be changed to the ointment instead please advise them on this matter at  [phone]

## 2020-10-27 DIAGNOSIS — E03.4 HYPOTHYROIDISM DUE TO ACQUIRED ATROPHY OF THYROID: Chronic | ICD-10-CM

## 2020-10-27 NOTE — TELEPHONE ENCOUNTER
No new care gaps identified.  Powered by Dash[employer]. Reference number: [id number]. 10/27/2020 8:39:50 AM   CDT

## 2020-10-28 RX ORDER — LEVOTHYROXINE SODIUM 50 UG/1
50 TABLET ORAL DAILY
Qty: 90 TABLET | Refills: 3 | Status: SHIPPED | OUTPATIENT
Start: 2020-10-28 | End: 2021-08-31 | Stop reason: SDUPTHER

## 2020-10-28 NOTE — PROGRESS NOTES
Refill Authorization Note   Opal Sahu is requesting a refill authorization.  Brief assessment and rationale for refill: Approve     Medication Therapy Plan: TSH-WNL    Medication reconciliation completed: No   Comments:       Requested Prescriptions   Pending Prescriptions Disp Refills    SYNTHROID 50 mcg tablet [Pharmacy Med Name: Synthroid 50 mcg tablet] 90 tablet 3     Sig: Take 1 tablet (50 mcg total) by mouth once daily.       Endocrinology:  Hypothyroid Agents Failed - 10/27/2020  8:39 AM        Failed - Manual Review: FT4 is not required if last TSH is WNL.        Passed - Patient is at least 18 years old        Passed - Office visit in past 12 months or future 90 days     Recent Outpatient Visits            2 weeks ago Wellness examination    [facility] JOLENE Bailey MD    3 months ago Primary osteoarthritis of both knees    [place] [facility] Kieran Shay MD    4 months ago Primary osteoarthritis of both knees    [facility] Kieran Shay MD    1 year ago Wellness examination    [facility] JOLENE Bailey MD    1 year ago Primary osteoarthritis of right knee    [facility] [place] Jorge Montoya MD          Future Appointments              In 1 week [facility] DEXA1 [facility]    In 4 months LAB, [place] [facility]    In 4 months JOLENE Bailey MD [facility]                Passed - TSH in normal range and within 360 days     TSH   Date Value Ref Range Status   08/13/2020 0.517 0.400 - 4.000 uIU/mL Final   10/25/2019 0.586 0.400 - 4.000 uIU/mL Final   08/17/2019 0.383 (L) 0.400 - 4.000 uIU/mL Final              Passed - T4 free within 1080 days     Free T4   Date Value Ref Range Status   08/17/2019 1.03 0.71 - 1.51 ng/dL Final   07/17/2013 1.25 0.71 - 1.51 ng/dl Final   06/22/2011 1.22 0.71 - 1.51 ng/dl Final                   Appointments  past 12m or future 3m with PCP    Date Provider   Last Visit   10/8/2020 JOLENE Bailey MD   Next Visit   3/25/2021 JOLENE Bailey MD   ED visits in past 90 days: 0     Note composed:4:05 PM 10/28/2020

## 2020-11-04 ENCOUNTER — HOSPITAL ENCOUNTER (OUTPATIENT)
Dept: RADIOLOGY | Facility: HOSPITAL | Age: 70
Discharge: HOME | End: 2020-11-04
Attending: FAMILY MEDICINE
Payer: COMMERCIAL

## 2020-11-04 ENCOUNTER — PATIENT OUTREACH (OUTPATIENT)
Dept: ADMINISTRATIVE | Facility: OTHER | Age: 70
End: 2020-11-04

## 2020-11-04 ENCOUNTER — OFFICE VISIT (OUTPATIENT)
Dept: PHYSICAL MEDICINE AND REHAB | Facility: CLINIC | Age: 70
End: 2020-11-04
Payer: COMMERCIAL

## 2020-11-04 VITALS — HEIGHT: 66 IN | BODY MASS INDEX: 22.98 KG/M2 | WEIGHT: 143 LBS

## 2020-11-04 DIAGNOSIS — Z13.820 SCREENING FOR OSTEOPOROSIS: ICD-10-CM

## 2020-11-04 DIAGNOSIS — Z78.0 POSTMENOPAUSAL: ICD-10-CM

## 2020-11-04 DIAGNOSIS — M67.952 TENDINOPATHY OF LEFT GLUTEUS MEDIUS: Primary | ICD-10-CM

## 2020-11-04 PROCEDURE — 1159F MED LIST DOCD IN RCRD: CPT | Mod: S$GLB,,, | Performed by: PHYSICAL MEDICINE & REHABILITATION

## 2020-11-04 PROCEDURE — 20611 LARGE JOINT ASPIRATION/INJECTION: L GREATER TROCHANTERIC BURSA: ICD-10-PCS | Mod: LT,S$GLB,, | Performed by: PHYSICAL MEDICINE & REHABILITATION

## 2020-11-04 PROCEDURE — 1126F PR PAIN SEVERITY QUANTIFIED, NO PAIN PRESENT: ICD-10-PCS | Mod: S$GLB,,, | Performed by: PHYSICAL MEDICINE & REHABILITATION

## 2020-11-04 PROCEDURE — 3008F PR BODY MASS INDEX (BMI) DOCUMENTED: ICD-10-PCS | Mod: CPTII,S$GLB,, | Performed by: PHYSICAL MEDICINE & REHABILITATION

## 2020-11-04 PROCEDURE — 99999 PR PBB SHADOW E&M-EST. PATIENT-LVL III: ICD-10-PCS | Mod: PBBFAC,,, | Performed by: PHYSICAL MEDICINE & REHABILITATION

## 2020-11-04 PROCEDURE — 77080 DEXA BONE DENSITY SPINE HIP: ICD-10-PCS | Mod: 26,,, | Performed by: RADIOLOGY

## 2020-11-04 PROCEDURE — 77080 DXA BONE DENSITY AXIAL: CPT | Mod: TC,PO

## 2020-11-04 PROCEDURE — 20611 DRAIN/INJ JOINT/BURSA W/US: CPT | Mod: LT,S$GLB,, | Performed by: PHYSICAL MEDICINE & REHABILITATION

## 2020-11-04 PROCEDURE — 1159F PR MEDICATION LIST DOCUMENTED IN MEDICAL RECORD: ICD-10-PCS | Mod: S$GLB,,, | Performed by: PHYSICAL MEDICINE & REHABILITATION

## 2020-11-04 PROCEDURE — 1101F PT FALLS ASSESS-DOCD LE1/YR: CPT | Mod: CPTII,S$GLB,, | Performed by: PHYSICAL MEDICINE & REHABILITATION

## 2020-11-04 PROCEDURE — 99212 PR OFFICE/OUTPT VISIT, EST, LEVL II, 10-19 MIN: ICD-10-PCS | Mod: 25,S$GLB,, | Performed by: PHYSICAL MEDICINE & REHABILITATION

## 2020-11-04 PROCEDURE — 99212 OFFICE O/P EST SF 10 MIN: CPT | Mod: 25,S$GLB,, | Performed by: PHYSICAL MEDICINE & REHABILITATION

## 2020-11-04 PROCEDURE — 1101F PR PT FALLS ASSESS DOC 0-1 FALLS W/OUT INJ PAST YR: ICD-10-PCS | Mod: CPTII,S$GLB,, | Performed by: PHYSICAL MEDICINE & REHABILITATION

## 2020-11-04 PROCEDURE — 99999 PR PBB SHADOW E&M-EST. PATIENT-LVL III: CPT | Mod: PBBFAC,,, | Performed by: PHYSICAL MEDICINE & REHABILITATION

## 2020-11-04 PROCEDURE — 1126F AMNT PAIN NOTED NONE PRSNT: CPT | Mod: S$GLB,,, | Performed by: PHYSICAL MEDICINE & REHABILITATION

## 2020-11-04 PROCEDURE — 3008F BODY MASS INDEX DOCD: CPT | Mod: CPTII,S$GLB,, | Performed by: PHYSICAL MEDICINE & REHABILITATION

## 2020-11-04 PROCEDURE — 77080 DXA BONE DENSITY AXIAL: CPT | Mod: 26,,, | Performed by: RADIOLOGY

## 2020-11-04 RX ORDER — TRIAMCINOLONE ACETONIDE 40 MG/ML
40 INJECTION, SUSPENSION INTRA-ARTICULAR; INTRAMUSCULAR
Status: DISCONTINUED | OUTPATIENT
Start: 2020-11-04 | End: 2020-11-04 | Stop reason: HOSPADM

## 2020-11-04 RX ADMIN — TRIAMCINOLONE ACETONIDE 40 MG: 40 INJECTION, SUSPENSION INTRA-ARTICULAR; INTRAMUSCULAR at 02:11

## 2020-11-04 NOTE — PROCEDURES
Large Joint Aspiration/Injection: L greater trochanteric bursa    Date/Time: 11/4/2020 2:30 PM  Performed by: Kieran Shay MD  Authorized by: Kieran Shay MD     Consent Done?:  Yes (Verbal)  Indications:  Pain  Site marked: the procedure site was marked    Timeout: prior to procedure the correct patient, procedure, and site was verified    Prep: patient was prepped and draped in usual sterile fashion    Local anesthesia used?: No      Details:  Needle Size:  22 G  Ultrasonic Guidance for needle placement?: Yes    Images are saved and documented.  Approach: in plane, lat to med.  Location:  Hip  Site:  L greater trochanteric bursa  Medications:  40 mg triamcinolone acetonide 40 mg/mL  Patient tolerance:  Patient tolerated the procedure well with no immediate complications     Ultrasound guidance was used for correct needle placement, the images were saved will be uploaded to EMR.

## 2020-11-04 NOTE — PROGRESS NOTES
[facility]    CHIEF COMPLAINT:   Chief Complaint   Patient presents with    Hip Pain     HISTORY OF PRESENT ILLNESS: Opal Sahu is a 70 y.o. female who presents to me for evaluation of left hip pain. Patient has been seen for bilateral knee pain. Received bilateral stem cells x1 and Euflexxa x3.(8/12/2020, 8/19/2020). Continues to have bilateral knee pain localized to medial joint line.    Left hip pain is posterior/lateral. Hip pain has been intermittent for about 1 year. Increasing in frequency. Stable intensity of pain. No radiation to the groin. Pain is intermittent. Most noticeable from sitting to standing. Pain not present overnight. Pain worsens throughout the day.  Patient takes aleve occasionally. Patient does intentional exercise with yoga and bicycle riding. Patient denies recent illness. Denies numbness and subjective weakness.    Review of Systems   Constitutional: Negative for fever.   HENT: Negative for drooling.    Eyes: Negative for discharge.   Respiratory: Negative for choking.    Cardiovascular: Negative for chest pain.   Genitourinary: Negative for flank pain.   Skin: Negative for wound.   Allergic/Immunologic: Negative for immunocompromised state.   Neurological: Negative for tremors and syncope.   Psychiatric/Behavioral: Negative for behavioral problems.     Past Medical History:   Past Medical History:   Diagnosis Date    Hyperlipidemia     Hypothyroid        Past Surgical History:   Past Surgical History:   Procedure Laterality Date    None         Family History:   Family History   Problem Relation Age of Onset    Heart disease Mother     Cancer Father     Heart disease Brother     Breast cancer Other        Medications:   Current Outpatient Medications on File Prior to Visit   Medication Sig Dispense Refill    aspirin 81 mg Tab Every day      atorvastatin (LIPITOR) 10 MG tablet TAKE ONE TABLET BY MOUTH ONCE DAILY 90 tablet 0    bimatoprost (LATISSE)  0.03 % ophthalmic solution Place one drop on applicator and apply evenly along the skin of the upper eyelid at base of eyelashes once daily at bedtime; repeat procedure for second eye (use a clean applicator). 5 mL 1    mupirocin (BACTROBAN) 2 % ointment Apply topically 2 (two) times daily. 22 g 1    mupirocin calcium 2% (BACTROBAN) 2 % cream Apply topically 2 (two) times daily. Apply to woundTwice a day to the wound. 30 g 6    SYNTHROID 50 mcg tablet Take 1 tablet (50 mcg total) by mouth once daily. 90 tablet 3    tretinoin (RETIN-A) 0.1 % cream Apply topically every evening. 45 g 1    valACYclovir (VALTREX) 1000 MG tablet Take 2 tablets (2,000 mg total) by mouth 2 (two) times daily. for 1 day 30 tablet 1     Current Facility-Administered Medications on File Prior to Visit   Medication Dose Route Frequency Provider Last Rate Last Dose    hyaluronate sodium, stabilized (MONOVISC) Syrg 1 Dose  1 Dose Intra-articular 1 time in Clinic/HOD Alexandra Rodriguez MD        sodium hyaluronate (EUFLEXXA) 10 mg/mL(mw 2.4 -3.6 million) injection 20 mg  20 mg Intra-articular 1 time in Clinic/HOD Kieran Shay MD           Allergies:   Review of patient's allergies indicates:   Allergen Reactions    Povidone-iodine      Other reaction(s): Unknown       Social History:   Social History     Socioeconomic History    Marital status:      Spouse name: Not on file    Number of children: Not on file    Years of education: Not on file    Highest education level: Not on file   Occupational History     Employer: [employer]   Social Needs    Financial resource strain: Not on file    Food insecurity     Worry: Not on file     Inability: Not on file    Transportation needs     Medical: Not on file     Non-medical: Not on file   Tobacco Use    Smoking status: Never Smoker   Substance and Sexual Activity    Alcohol use: Yes    Drug use: No    Sexual activity: Not on file   Lifestyle     "Physical activity     Days per week: Not on file     Minutes per session: Not on file    Stress: Not on file   Relationships    Social connections     Talks on phone: Not on file     Gets together: Not on file     Attends Buddhist service: Not on file     Active member of club or organization: Not on file     Attends meetings of clubs or organizations: Not on file     Relationship status: Not on file   Other Topics Concern    Not on file   Social History Narrative    Not on file     PHYSICAL EXAMINATION:   General    Vitals:    11/04/20 1426   Weight: 64.9 kg (143 lb)   Height: 5' 6" (1.676 m)     Constitutional: Oriented to person, place, and time. No apparent distress. Pleasant.  HENT:   Head: Normocephalic and atraumatic.   Eyes: Right eye exhibits no discharge. Left eye exhibits no discharge. No scleral icterus.   Pulmonary/Chest: Effort normal. No respiratory distress.   Abdominal: There is no guarding.   Neurological: Alert and oriented to person, place, and time.   Psychiatric: Behavior is normal.   Left Hip Exam     Tenderness   The patient is experiencing tenderness in the greater trochanter.    Range of Motion   Extension: 10   Left hip flexion: 115.     Muscle Strength   Abduction: 5/5   Adduction: 5/5   Flexion: 5/5     Tests   ALISIA: negative    Other   Erythema: absent  Scars: absent  Sensation: normal  Pulse: present    Comments:  -log roll  -ALISIA  -FADIR  No TTP over SIJ or pirformis        INSPECTION: There is no swelling, ecchymoses, erythema or gross deformity.  LIGAMENTOUS LAXITY AND STABILITY: Negative ALISIA test. No pain with SI joint compression. Negative log roll.  GAIT/DYNAMIC: symmetric gait without compensaton    ASSESSMENT:   1. Tendinopathy of left gluteus medius      PLAN:     1. Time was spent reviewing the above diagnosis in depth with Opal today, including acute management and rehabilitation.     2.  In terms of her left hip pain, her symptoms and exam are suggestive of " gluteal tendinopathy.  Recommended conservative treatment.  Greater trochanteric bursal injection performed under US guidance done in clinic today.  See separate procedure.    3. Patient has history of bilateral knee pain that has had minimal relief with stem cells x1 (split 3cc between both knees) and Euflexxa x 3. Patient continues to have knee pains but hip pain is worse.  We discussed that the majority of beneficial effects from the bone marrow aspirate injections have taken place in the 1st 3 months.  However, would like to prolong any infusion of corticosteroid into the knees, as there still me may some anabolic processes occurring.  She may consider injection of monovisc in the next few months.    4. RTC as needed if pain does not subside and continued to interfere with function.    The above note was completed, in part, with the aid of Dragon dictation software/hardware. Translation errors may be present.

## 2020-11-04 NOTE — PROGRESS NOTES
Health Maintenance Due   Topic Date Due    Pneumococcal Vaccine (65+ Low/Medium Risk) (2 of 2 - PPSV23) 11/20/2019    DEXA SCAN  11/21/2019     Updates were requested from care everywhere.  Chart was reviewed for overdue Proactive [facility] En[facility]ters ([facility]) topics (CRS, Breast Cancer Screening, Eye exam)  Health Maintenance has been updated.  LINKS immunization registry triggered.  Immunizations were reconciled.

## 2021-01-13 ENCOUNTER — PATIENT MESSAGE (OUTPATIENT)
Dept: FAMILY MEDICINE | Facility: CLINIC | Age: 71
End: 2021-01-13

## 2021-03-26 ENCOUNTER — LAB VISIT (OUTPATIENT)
Dept: LAB | Facility: HOSPITAL | Age: 71
End: 2021-03-26
Attending: FAMILY MEDICINE
Payer: COMMERCIAL

## 2021-03-26 DIAGNOSIS — E78.00 HYPERCHOLESTEREMIA: Chronic | ICD-10-CM

## 2021-03-26 DIAGNOSIS — E03.9 HYPOTHYROIDISM, UNSPECIFIED TYPE: Chronic | ICD-10-CM

## 2021-03-26 DIAGNOSIS — Z00.00 WELLNESS EXAMINATION: ICD-10-CM

## 2021-03-26 DIAGNOSIS — E78.00 HYPERCHOLESTEREMIA: ICD-10-CM

## 2021-03-26 LAB
ALBUMIN SERPL BCP-MCNC: 3.7 G/DL (ref 3.5–5.2)
ALP SERPL-CCNC: 59 U/L (ref 55–135)
ALT SERPL W/O P-5'-P-CCNC: 14 U/L (ref 10–44)
ANION GAP SERPL CALC-SCNC: 9 MMOL/L (ref 8–16)
AST SERPL-CCNC: 13 U/L (ref 10–40)
BASOPHILS # BLD AUTO: 0.06 K/UL (ref 0–0.2)
BASOPHILS NFR BLD: 0.9 % (ref 0–1.9)
BILIRUB SERPL-MCNC: 1.3 MG/DL (ref 0.1–1)
BUN SERPL-MCNC: 13 MG/DL (ref 8–23)
CALCIUM SERPL-MCNC: 8.9 MG/DL (ref 8.7–10.5)
CHLORIDE SERPL-SCNC: 107 MMOL/L (ref 95–110)
CHOLEST SERPL-MCNC: 189 MG/DL (ref 120–199)
CHOLEST/HDLC SERPL: 3.3 {RATIO} (ref 2–5)
CO2 SERPL-SCNC: 25 MMOL/L (ref 23–29)
CREAT SERPL-MCNC: 0.7 MG/DL (ref 0.5–1.4)
DIFFERENTIAL METHOD: ABNORMAL
EOSINOPHIL # BLD AUTO: 0.3 K/UL (ref 0–0.5)
EOSINOPHIL NFR BLD: 5 % (ref 0–8)
ERYTHROCYTE [DISTWIDTH] IN BLOOD BY AUTOMATED COUNT: 12.8 % (ref 11.5–14.5)
EST. GFR  (AFRICAN AMERICAN): >60 ML/MIN/1.73 M^2
EST. GFR  (NON AFRICAN AMERICAN): >60 ML/MIN/1.73 M^2
GLUCOSE SERPL-MCNC: 85 MG/DL (ref 70–110)
HCT VFR BLD AUTO: 39 % (ref 37–48.5)
HDLC SERPL-MCNC: 57 MG/DL (ref 40–75)
HDLC SERPL: 30.2 % (ref 20–50)
HGB BLD-MCNC: 12.7 G/DL (ref 12–16)
IMM GRANULOCYTES # BLD AUTO: 0.01 K/UL (ref 0–0.04)
IMM GRANULOCYTES NFR BLD AUTO: 0.2 % (ref 0–0.5)
LDLC SERPL CALC-MCNC: 110.6 MG/DL (ref 63–159)
LYMPHOCYTES # BLD AUTO: 2 K/UL (ref 1–4.8)
LYMPHOCYTES NFR BLD: 30.9 % (ref 18–48)
MCH RBC QN AUTO: 29.7 PG (ref 27–31)
MCHC RBC AUTO-ENTMCNC: 32.6 G/DL (ref 32–36)
MCV RBC AUTO: 91 FL (ref 82–98)
MONOCYTES # BLD AUTO: 0.7 K/UL (ref 0.3–1)
MONOCYTES NFR BLD: 10.8 % (ref 4–15)
NEUTROPHILS # BLD AUTO: 3.4 K/UL (ref 1.8–7.7)
NEUTROPHILS NFR BLD: 52.2 % (ref 38–73)
NONHDLC SERPL-MCNC: 132 MG/DL
NRBC BLD-RTO: 0 /100 WBC
PLATELET # BLD AUTO: 360 K/UL (ref 150–350)
PMV BLD AUTO: 9.4 FL (ref 9.2–12.9)
POTASSIUM SERPL-SCNC: 4.1 MMOL/L (ref 3.5–5.1)
PROT SERPL-MCNC: 6.6 G/DL (ref 6–8.4)
RBC # BLD AUTO: 4.27 M/UL (ref 4–5.4)
SODIUM SERPL-SCNC: 141 MMOL/L (ref 136–145)
TRIGL SERPL-MCNC: 107 MG/DL (ref 30–150)
TSH SERPL DL<=0.005 MIU/L-ACNC: 0.75 UIU/ML (ref 0.4–4)
WBC # BLD AUTO: 6.41 K/UL (ref 3.9–12.7)

## 2021-03-26 PROCEDURE — 80061 LIPID PANEL: CPT | Performed by: FAMILY MEDICINE

## 2021-03-26 PROCEDURE — 85025 COMPLETE CBC W/AUTO DIFF WBC: CPT | Performed by: FAMILY MEDICINE

## 2021-03-26 PROCEDURE — 36415 COLL VENOUS BLD VENIPUNCTURE: CPT | Mod: PN | Performed by: FAMILY MEDICINE

## 2021-03-26 PROCEDURE — 84443 ASSAY THYROID STIM HORMONE: CPT | Performed by: FAMILY MEDICINE

## 2021-03-26 PROCEDURE — 80053 COMPREHEN METABOLIC PANEL: CPT | Performed by: FAMILY MEDICINE

## 2021-03-29 RX ORDER — ATORVASTATIN CALCIUM 10 MG/1
TABLET, FILM COATED ORAL
Qty: 90 TABLET | Refills: 1 | Status: SHIPPED | OUTPATIENT
Start: 2021-03-29 | End: 2021-06-29

## 2021-05-10 ENCOUNTER — OFFICE VISIT (OUTPATIENT)
Dept: FAMILY MEDICINE | Facility: CLINIC | Age: 71
End: 2021-05-10
Payer: COMMERCIAL

## 2021-05-10 VITALS
HEART RATE: 80 BPM | OXYGEN SATURATION: 96 % | RESPIRATION RATE: 20 BRPM | SYSTOLIC BLOOD PRESSURE: 118 MMHG | TEMPERATURE: 98 F | DIASTOLIC BLOOD PRESSURE: 78 MMHG

## 2021-05-10 DIAGNOSIS — E03.9 HYPOTHYROIDISM, UNSPECIFIED TYPE: Chronic | ICD-10-CM

## 2021-05-10 DIAGNOSIS — E78.00 HYPERCHOLESTEREMIA: Primary | Chronic | ICD-10-CM

## 2021-05-10 PROCEDURE — 3288F FALL RISK ASSESSMENT DOCD: CPT | Mod: CPTII,S$GLB,, | Performed by: FAMILY MEDICINE

## 2021-05-10 PROCEDURE — 1101F PR PT FALLS ASSESS DOC 0-1 FALLS W/OUT INJ PAST YR: ICD-10-PCS | Mod: CPTII,S$GLB,, | Performed by: FAMILY MEDICINE

## 2021-05-10 PROCEDURE — 1126F AMNT PAIN NOTED NONE PRSNT: CPT | Mod: S$GLB,,, | Performed by: FAMILY MEDICINE

## 2021-05-10 PROCEDURE — 99214 PR OFFICE/OUTPT VISIT, EST, LEVL IV, 30-39 MIN: ICD-10-PCS | Mod: S$GLB,,, | Performed by: FAMILY MEDICINE

## 2021-05-10 PROCEDURE — 99214 OFFICE O/P EST MOD 30 MIN: CPT | Mod: S$GLB,,, | Performed by: FAMILY MEDICINE

## 2021-05-10 PROCEDURE — 99999 PR PBB SHADOW E&M-EST. PATIENT-LVL III: CPT | Mod: PBBFAC,,, | Performed by: FAMILY MEDICINE

## 2021-05-10 PROCEDURE — 1159F PR MEDICATION LIST DOCUMENTED IN MEDICAL RECORD: ICD-10-PCS | Mod: S$GLB,,, | Performed by: FAMILY MEDICINE

## 2021-05-10 PROCEDURE — 1101F PT FALLS ASSESS-DOCD LE1/YR: CPT | Mod: CPTII,S$GLB,, | Performed by: FAMILY MEDICINE

## 2021-05-10 PROCEDURE — 1126F PR PAIN SEVERITY QUANTIFIED, NO PAIN PRESENT: ICD-10-PCS | Mod: S$GLB,,, | Performed by: FAMILY MEDICINE

## 2021-05-10 PROCEDURE — 3288F PR FALLS RISK ASSESSMENT DOCUMENTED: ICD-10-PCS | Mod: CPTII,S$GLB,, | Performed by: FAMILY MEDICINE

## 2021-05-10 PROCEDURE — 99999 PR PBB SHADOW E&M-EST. PATIENT-LVL III: ICD-10-PCS | Mod: PBBFAC,,, | Performed by: FAMILY MEDICINE

## 2021-05-10 PROCEDURE — 1159F MED LIST DOCD IN RCRD: CPT | Mod: S$GLB,,, | Performed by: FAMILY MEDICINE

## 2021-05-10 RX ORDER — CEPHALEXIN 500 MG/1
500 CAPSULE ORAL EVERY 8 HOURS
COMMUNITY
Start: 2021-04-07 | End: 2021-08-02

## 2021-06-27 DIAGNOSIS — E78.00 HYPERCHOLESTEREMIA: ICD-10-CM

## 2021-06-29 RX ORDER — ATORVASTATIN CALCIUM 10 MG/1
TABLET, FILM COATED ORAL
Qty: 90 TABLET | Refills: 3 | Status: SHIPPED | OUTPATIENT
Start: 2021-06-29 | End: 2021-07-23

## 2021-07-01 ENCOUNTER — PATIENT MESSAGE (OUTPATIENT)
Dept: PHYSICAL MEDICINE AND REHAB | Facility: CLINIC | Age: 71
End: 2021-07-01

## 2021-07-06 ENCOUNTER — PATIENT OUTREACH (OUTPATIENT)
Dept: ADMINISTRATIVE | Facility: OTHER | Age: 71
End: 2021-07-06

## 2021-07-07 ENCOUNTER — TELEPHONE (OUTPATIENT)
Dept: PHYSICAL MEDICINE AND REHAB | Facility: CLINIC | Age: 71
End: 2021-07-07

## 2021-07-07 ENCOUNTER — OFFICE VISIT (OUTPATIENT)
Dept: PHYSICAL MEDICINE AND REHAB | Facility: CLINIC | Age: 71
End: 2021-07-07
Payer: COMMERCIAL

## 2021-07-07 VITALS — HEIGHT: 66 IN | WEIGHT: 143 LBS | BODY MASS INDEX: 22.98 KG/M2

## 2021-07-07 DIAGNOSIS — M17.0 PRIMARY OSTEOARTHRITIS OF BOTH KNEES: Primary | ICD-10-CM

## 2021-07-07 PROCEDURE — 1159F MED LIST DOCD IN RCRD: CPT | Mod: S$GLB,,, | Performed by: PHYSICAL MEDICINE & REHABILITATION

## 2021-07-07 PROCEDURE — 99212 OFFICE O/P EST SF 10 MIN: CPT | Mod: S$GLB,,, | Performed by: PHYSICAL MEDICINE & REHABILITATION

## 2021-07-07 PROCEDURE — 1126F AMNT PAIN NOTED NONE PRSNT: CPT | Mod: S$GLB,,, | Performed by: PHYSICAL MEDICINE & REHABILITATION

## 2021-07-07 PROCEDURE — 3008F PR BODY MASS INDEX (BMI) DOCUMENTED: ICD-10-PCS | Mod: CPTII,S$GLB,, | Performed by: PHYSICAL MEDICINE & REHABILITATION

## 2021-07-07 PROCEDURE — 1159F PR MEDICATION LIST DOCUMENTED IN MEDICAL RECORD: ICD-10-PCS | Mod: S$GLB,,, | Performed by: PHYSICAL MEDICINE & REHABILITATION

## 2021-07-07 PROCEDURE — 99212 PR OFFICE/OUTPT VISIT, EST, LEVL II, 10-19 MIN: ICD-10-PCS | Mod: S$GLB,,, | Performed by: PHYSICAL MEDICINE & REHABILITATION

## 2021-07-07 PROCEDURE — 99999 PR PBB SHADOW E&M-EST. PATIENT-LVL III: CPT | Mod: PBBFAC,,, | Performed by: PHYSICAL MEDICINE & REHABILITATION

## 2021-07-07 PROCEDURE — 1126F PR PAIN SEVERITY QUANTIFIED, NO PAIN PRESENT: ICD-10-PCS | Mod: S$GLB,,, | Performed by: PHYSICAL MEDICINE & REHABILITATION

## 2021-07-07 PROCEDURE — 99999 PR PBB SHADOW E&M-EST. PATIENT-LVL III: ICD-10-PCS | Mod: PBBFAC,,, | Performed by: PHYSICAL MEDICINE & REHABILITATION

## 2021-07-07 PROCEDURE — 3008F BODY MASS INDEX DOCD: CPT | Mod: CPTII,S$GLB,, | Performed by: PHYSICAL MEDICINE & REHABILITATION

## 2021-07-14 ENCOUNTER — OFFICE VISIT (OUTPATIENT)
Dept: PHYSICAL MEDICINE AND REHAB | Facility: CLINIC | Age: 71
End: 2021-07-14
Payer: COMMERCIAL

## 2021-07-14 VITALS — HEIGHT: 66 IN | WEIGHT: 143.06 LBS | BODY MASS INDEX: 22.99 KG/M2

## 2021-07-14 DIAGNOSIS — M17.0 PRIMARY OSTEOARTHRITIS OF BOTH KNEES: Primary | ICD-10-CM

## 2021-07-14 PROCEDURE — 1126F PR PAIN SEVERITY QUANTIFIED, NO PAIN PRESENT: ICD-10-PCS | Mod: S$GLB,,, | Performed by: PHYSICAL MEDICINE & REHABILITATION

## 2021-07-14 PROCEDURE — 20611 DRAIN/INJ JOINT/BURSA W/US: CPT | Mod: 50,S$GLB,, | Performed by: PHYSICAL MEDICINE & REHABILITATION

## 2021-07-14 PROCEDURE — 99499 NO LOS: ICD-10-PCS | Mod: S$GLB,,, | Performed by: PHYSICAL MEDICINE & REHABILITATION

## 2021-07-14 PROCEDURE — 1101F PR PT FALLS ASSESS DOC 0-1 FALLS W/OUT INJ PAST YR: ICD-10-PCS | Mod: CPTII,S$GLB,, | Performed by: PHYSICAL MEDICINE & REHABILITATION

## 2021-07-14 PROCEDURE — 1126F AMNT PAIN NOTED NONE PRSNT: CPT | Mod: S$GLB,,, | Performed by: PHYSICAL MEDICINE & REHABILITATION

## 2021-07-14 PROCEDURE — 1101F PT FALLS ASSESS-DOCD LE1/YR: CPT | Mod: CPTII,S$GLB,, | Performed by: PHYSICAL MEDICINE & REHABILITATION

## 2021-07-14 PROCEDURE — 3288F FALL RISK ASSESSMENT DOCD: CPT | Mod: CPTII,S$GLB,, | Performed by: PHYSICAL MEDICINE & REHABILITATION

## 2021-07-14 PROCEDURE — 3288F PR FALLS RISK ASSESSMENT DOCUMENTED: ICD-10-PCS | Mod: CPTII,S$GLB,, | Performed by: PHYSICAL MEDICINE & REHABILITATION

## 2021-07-14 PROCEDURE — 99999 PR PBB SHADOW E&M-EST. PATIENT-LVL III: CPT | Mod: PBBFAC,,, | Performed by: PHYSICAL MEDICINE & REHABILITATION

## 2021-07-14 PROCEDURE — 99999 PR PBB SHADOW E&M-EST. PATIENT-LVL III: ICD-10-PCS | Mod: PBBFAC,,, | Performed by: PHYSICAL MEDICINE & REHABILITATION

## 2021-07-14 PROCEDURE — 3008F PR BODY MASS INDEX (BMI) DOCUMENTED: ICD-10-PCS | Mod: CPTII,S$GLB,, | Performed by: PHYSICAL MEDICINE & REHABILITATION

## 2021-07-14 PROCEDURE — 99499 UNLISTED E&M SERVICE: CPT | Mod: S$GLB,,, | Performed by: PHYSICAL MEDICINE & REHABILITATION

## 2021-07-14 PROCEDURE — 20611 LARGE JOINT ASPIRATION/INJECTION: BILATERAL KNEE: ICD-10-PCS | Mod: 50,S$GLB,, | Performed by: PHYSICAL MEDICINE & REHABILITATION

## 2021-07-14 PROCEDURE — 3008F BODY MASS INDEX DOCD: CPT | Mod: CPTII,S$GLB,, | Performed by: PHYSICAL MEDICINE & REHABILITATION

## 2021-07-23 DIAGNOSIS — E78.00 HYPERCHOLESTEREMIA: ICD-10-CM

## 2021-07-23 RX ORDER — ATORVASTATIN CALCIUM 10 MG/1
TABLET, FILM COATED ORAL
Qty: 90 TABLET | Refills: 2 | Status: SHIPPED | OUTPATIENT
Start: 2021-07-23 | End: 2022-02-25

## 2021-07-29 ENCOUNTER — PATIENT MESSAGE (OUTPATIENT)
Dept: FAMILY MEDICINE | Facility: CLINIC | Age: 71
End: 2021-07-29

## 2021-07-29 ENCOUNTER — TELEPHONE (OUTPATIENT)
Dept: FAMILY MEDICINE | Facility: CLINIC | Age: 71
End: 2021-07-29

## 2021-07-29 DIAGNOSIS — Z12.31 ENCOUNTER FOR SCREENING MAMMOGRAM FOR MALIGNANT NEOPLASM OF BREAST: Primary | ICD-10-CM

## 2021-07-30 ENCOUNTER — PATIENT MESSAGE (OUTPATIENT)
Dept: FAMILY MEDICINE | Facility: CLINIC | Age: 71
End: 2021-07-30

## 2021-08-02 ENCOUNTER — OFFICE VISIT (OUTPATIENT)
Dept: URGENT CARE | Facility: CLINIC | Age: 71
End: 2021-08-02
Payer: COMMERCIAL

## 2021-08-02 VITALS
TEMPERATURE: 98 F | WEIGHT: 143 LBS | BODY MASS INDEX: 22.98 KG/M2 | OXYGEN SATURATION: 98 % | HEIGHT: 66 IN | HEART RATE: 101 BPM | SYSTOLIC BLOOD PRESSURE: 151 MMHG | RESPIRATION RATE: 16 BRPM | DIASTOLIC BLOOD PRESSURE: 86 MMHG

## 2021-08-02 DIAGNOSIS — U07.1 COVID-19: Primary | ICD-10-CM

## 2021-08-02 DIAGNOSIS — U07.1 COVID-19 VIRUS DETECTED: ICD-10-CM

## 2021-08-02 DIAGNOSIS — Z20.822 CONTACT WITH AND (SUSPECTED) EXPOSURE TO COVID-19: ICD-10-CM

## 2021-08-02 LAB
CTP QC/QA: YES
SARS-COV-2 RDRP RESP QL NAA+PROBE: POSITIVE

## 2021-08-02 PROCEDURE — 1160F PR REVIEW ALL MEDS BY PRESCRIBER/CLIN PHARMACIST DOCUMENTED: ICD-10-PCS | Mod: CPTII,S$GLB,, | Performed by: PHYSICIAN ASSISTANT

## 2021-08-02 PROCEDURE — 3008F PR BODY MASS INDEX (BMI) DOCUMENTED: ICD-10-PCS | Mod: CPTII,S$GLB,, | Performed by: PHYSICIAN ASSISTANT

## 2021-08-02 PROCEDURE — 1160F RVW MEDS BY RX/DR IN RCRD: CPT | Mod: CPTII,S$GLB,, | Performed by: PHYSICIAN ASSISTANT

## 2021-08-02 PROCEDURE — U0002 COVID-19 LAB TEST NON-CDC: HCPCS | Mod: QW,S$GLB,, | Performed by: PHYSICIAN ASSISTANT

## 2021-08-02 PROCEDURE — 3008F BODY MASS INDEX DOCD: CPT | Mod: CPTII,S$GLB,, | Performed by: PHYSICIAN ASSISTANT

## 2021-08-02 PROCEDURE — U0002: ICD-10-PCS | Mod: QW,S$GLB,, | Performed by: PHYSICIAN ASSISTANT

## 2021-08-02 PROCEDURE — 99214 PR OFFICE/OUTPT VISIT, EST, LEVL IV, 30-39 MIN: ICD-10-PCS | Mod: S$GLB,CS,, | Performed by: PHYSICIAN ASSISTANT

## 2021-08-02 PROCEDURE — 99214 OFFICE O/P EST MOD 30 MIN: CPT | Mod: S$GLB,CS,, | Performed by: PHYSICIAN ASSISTANT

## 2021-08-02 PROCEDURE — 3079F PR MOST RECENT DIASTOLIC BLOOD PRESSURE 80-89 MM HG: ICD-10-PCS | Mod: CPTII,S$GLB,, | Performed by: PHYSICIAN ASSISTANT

## 2021-08-02 PROCEDURE — 3077F SYST BP >= 140 MM HG: CPT | Mod: CPTII,S$GLB,, | Performed by: PHYSICIAN ASSISTANT

## 2021-08-02 PROCEDURE — 1159F PR MEDICATION LIST DOCUMENTED IN MEDICAL RECORD: ICD-10-PCS | Mod: CPTII,S$GLB,, | Performed by: PHYSICIAN ASSISTANT

## 2021-08-02 PROCEDURE — 3077F PR MOST RECENT SYSTOLIC BLOOD PRESSURE >= 140 MM HG: ICD-10-PCS | Mod: CPTII,S$GLB,, | Performed by: PHYSICIAN ASSISTANT

## 2021-08-02 PROCEDURE — 3079F DIAST BP 80-89 MM HG: CPT | Mod: CPTII,S$GLB,, | Performed by: PHYSICIAN ASSISTANT

## 2021-08-02 PROCEDURE — 1159F MED LIST DOCD IN RCRD: CPT | Mod: CPTII,S$GLB,, | Performed by: PHYSICIAN ASSISTANT

## 2021-08-02 RX ORDER — BENZONATATE 200 MG/1
200 CAPSULE ORAL 3 TIMES DAILY PRN
Qty: 30 CAPSULE | Refills: 0 | Status: SHIPPED | OUTPATIENT
Start: 2021-08-02 | End: 2021-08-12

## 2021-08-02 RX ORDER — AZELASTINE 1 MG/ML
1 SPRAY, METERED NASAL 2 TIMES DAILY
Qty: 30 ML | Refills: 0 | Status: SHIPPED | OUTPATIENT
Start: 2021-08-02 | End: 2024-01-04

## 2021-08-05 ENCOUNTER — NURSE TRIAGE (OUTPATIENT)
Dept: ADMINISTRATIVE | Facility: CLINIC | Age: 71
End: 2021-08-05

## 2021-08-05 ENCOUNTER — PATIENT MESSAGE (OUTPATIENT)
Dept: FAMILY MEDICINE | Facility: CLINIC | Age: 71
End: 2021-08-05

## 2021-08-06 ENCOUNTER — OFFICE VISIT (OUTPATIENT)
Dept: FAMILY MEDICINE | Facility: CLINIC | Age: 71
End: 2021-08-06
Payer: COMMERCIAL

## 2021-08-06 ENCOUNTER — TELEPHONE (OUTPATIENT)
Dept: FAMILY MEDICINE | Facility: CLINIC | Age: 71
End: 2021-08-06

## 2021-08-06 DIAGNOSIS — R35.0 URINARY FREQUENCY: Primary | ICD-10-CM

## 2021-08-06 PROCEDURE — 99214 OFFICE O/P EST MOD 30 MIN: CPT | Mod: 95,,, | Performed by: NURSE PRACTITIONER

## 2021-08-06 PROCEDURE — 99214 PR OFFICE/OUTPT VISIT, EST, LEVL IV, 30-39 MIN: ICD-10-PCS | Mod: 95,,, | Performed by: NURSE PRACTITIONER

## 2021-08-06 RX ORDER — SULFAMETHOXAZOLE AND TRIMETHOPRIM 800; 160 MG/1; MG/1
1 TABLET ORAL 2 TIMES DAILY
Qty: 10 TABLET | Refills: 0 | Status: SHIPPED | OUTPATIENT
Start: 2021-08-06 | End: 2021-08-06

## 2021-08-06 RX ORDER — NITROFURANTOIN (MACROCRYSTALS) 100 MG/1
100 CAPSULE ORAL EVERY 12 HOURS
Qty: 14 CAPSULE | Refills: 0 | Status: SHIPPED | OUTPATIENT
Start: 2021-08-06 | End: 2021-08-13

## 2021-08-12 ENCOUNTER — TELEPHONE (OUTPATIENT)
Dept: FAMILY MEDICINE | Facility: CLINIC | Age: 71
End: 2021-08-12

## 2021-08-25 ENCOUNTER — DOCUMENTATION ONLY (OUTPATIENT)
Dept: FAMILY MEDICINE | Facility: CLINIC | Age: 71
End: 2021-08-25

## 2021-08-26 ENCOUNTER — HOSPITAL ENCOUNTER (OUTPATIENT)
Dept: RADIOLOGY | Facility: HOSPITAL | Age: 71
Discharge: HOME | End: 2021-08-26
Attending: FAMILY MEDICINE
Payer: COMMERCIAL

## 2021-08-26 DIAGNOSIS — Z12.31 ENCOUNTER FOR SCREENING MAMMOGRAM FOR MALIGNANT NEOPLASM OF BREAST: ICD-10-CM

## 2021-08-26 PROCEDURE — 77063 MAMMO DIGITAL SCREENING BILAT WITH TOMO: ICD-10-PCS | Mod: 26,,, | Performed by: RADIOLOGY

## 2021-08-26 PROCEDURE — 77067 SCR MAMMO BI INCL CAD: CPT | Mod: TC,PO

## 2021-08-26 PROCEDURE — 77067 MAMMO DIGITAL SCREENING BILAT WITH TOMO: ICD-10-PCS | Mod: 26,,, | Performed by: RADIOLOGY

## 2021-08-26 PROCEDURE — 77063 BREAST TOMOSYNTHESIS BI: CPT | Mod: 26,,, | Performed by: RADIOLOGY

## 2021-08-26 PROCEDURE — 77067 SCR MAMMO BI INCL CAD: CPT | Mod: 26,,, | Performed by: RADIOLOGY

## 2021-08-31 DIAGNOSIS — E03.4 HYPOTHYROIDISM DUE TO ACQUIRED ATROPHY OF THYROID: Chronic | ICD-10-CM

## 2021-09-01 RX ORDER — LEVOTHYROXINE SODIUM 50 UG/1
50 TABLET ORAL DAILY
Qty: 90 TABLET | Refills: 3 | Status: SHIPPED | OUTPATIENT
Start: 2021-09-01 | End: 2021-11-17

## 2021-09-29 ENCOUNTER — PATIENT MESSAGE (OUTPATIENT)
Dept: FAMILY MEDICINE | Facility: CLINIC | Age: 71
End: 2021-09-29

## 2021-10-05 ENCOUNTER — PATIENT MESSAGE (OUTPATIENT)
Dept: FAMILY MEDICINE | Facility: CLINIC | Age: 71
End: 2021-10-05

## 2021-10-07 ENCOUNTER — PATIENT MESSAGE (OUTPATIENT)
Dept: FAMILY MEDICINE | Facility: CLINIC | Age: 71
End: 2021-10-07

## 2021-11-06 ENCOUNTER — LAB VISIT (OUTPATIENT)
Dept: LAB | Facility: HOSPITAL | Age: 71
End: 2021-11-06
Attending: FAMILY MEDICINE
Payer: COMMERCIAL

## 2021-11-06 DIAGNOSIS — E78.00 HYPERCHOLESTEREMIA: Chronic | ICD-10-CM

## 2021-11-06 DIAGNOSIS — E03.9 HYPOTHYROIDISM, UNSPECIFIED TYPE: Chronic | ICD-10-CM

## 2021-11-06 LAB
ALBUMIN SERPL BCP-MCNC: 3.7 G/DL (ref 3.5–5.2)
ALP SERPL-CCNC: 60 U/L (ref 55–135)
ALT SERPL W/O P-5'-P-CCNC: 16 U/L (ref 10–44)
ANION GAP SERPL CALC-SCNC: 7 MMOL/L (ref 8–16)
AST SERPL-CCNC: 13 U/L (ref 10–40)
BASOPHILS # BLD AUTO: 0.05 K/UL (ref 0–0.2)
BASOPHILS NFR BLD: 0.9 % (ref 0–1.9)
BILIRUB SERPL-MCNC: 1.1 MG/DL (ref 0.1–1)
BUN SERPL-MCNC: 15 MG/DL (ref 8–23)
CALCIUM SERPL-MCNC: 9.3 MG/DL (ref 8.7–10.5)
CHLORIDE SERPL-SCNC: 105 MMOL/L (ref 95–110)
CHOLEST SERPL-MCNC: 181 MG/DL (ref 120–199)
CHOLEST/HDLC SERPL: 3.3 {RATIO} (ref 2–5)
CO2 SERPL-SCNC: 27 MMOL/L (ref 23–29)
CREAT SERPL-MCNC: 0.7 MG/DL (ref 0.5–1.4)
DIFFERENTIAL METHOD: NORMAL
EOSINOPHIL # BLD AUTO: 0.3 K/UL (ref 0–0.5)
EOSINOPHIL NFR BLD: 5 % (ref 0–8)
ERYTHROCYTE [DISTWIDTH] IN BLOOD BY AUTOMATED COUNT: 12.9 % (ref 11.5–14.5)
EST. GFR  (AFRICAN AMERICAN): >60 ML/MIN/1.73 M^2
EST. GFR  (NON AFRICAN AMERICAN): >60 ML/MIN/1.73 M^2
GLUCOSE SERPL-MCNC: 103 MG/DL (ref 70–110)
HCT VFR BLD AUTO: 38.8 % (ref 37–48.5)
HDLC SERPL-MCNC: 55 MG/DL (ref 40–75)
HDLC SERPL: 30.4 % (ref 20–50)
HGB BLD-MCNC: 12.6 G/DL (ref 12–16)
IMM GRANULOCYTES # BLD AUTO: 0 K/UL (ref 0–0.04)
IMM GRANULOCYTES NFR BLD AUTO: 0 % (ref 0–0.5)
LDLC SERPL CALC-MCNC: 110.8 MG/DL (ref 63–159)
LYMPHOCYTES # BLD AUTO: 2 K/UL (ref 1–4.8)
LYMPHOCYTES NFR BLD: 33.9 % (ref 18–48)
MCH RBC QN AUTO: 29.5 PG (ref 27–31)
MCHC RBC AUTO-ENTMCNC: 32.5 G/DL (ref 32–36)
MCV RBC AUTO: 91 FL (ref 82–98)
MONOCYTES # BLD AUTO: 0.6 K/UL (ref 0.3–1)
MONOCYTES NFR BLD: 10.5 % (ref 4–15)
NEUTROPHILS # BLD AUTO: 2.9 K/UL (ref 1.8–7.7)
NEUTROPHILS NFR BLD: 49.7 % (ref 38–73)
NONHDLC SERPL-MCNC: 126 MG/DL
NRBC BLD-RTO: 0 /100 WBC
PLATELET # BLD AUTO: 364 K/UL (ref 150–450)
PMV BLD AUTO: 9.5 FL (ref 9.2–12.9)
POTASSIUM SERPL-SCNC: 4.2 MMOL/L (ref 3.5–5.1)
PROT SERPL-MCNC: 6.3 G/DL (ref 6–8.4)
RBC # BLD AUTO: 4.27 M/UL (ref 4–5.4)
SODIUM SERPL-SCNC: 139 MMOL/L (ref 136–145)
TRIGL SERPL-MCNC: 76 MG/DL (ref 30–150)
TSH SERPL DL<=0.005 MIU/L-ACNC: 0.4 UIU/ML (ref 0.4–4)
WBC # BLD AUTO: 5.79 K/UL (ref 3.9–12.7)

## 2021-11-06 PROCEDURE — 84443 ASSAY THYROID STIM HORMONE: CPT | Performed by: FAMILY MEDICINE

## 2021-11-06 PROCEDURE — 85025 COMPLETE CBC W/AUTO DIFF WBC: CPT | Performed by: FAMILY MEDICINE

## 2021-11-06 PROCEDURE — 80053 COMPREHEN METABOLIC PANEL: CPT | Performed by: FAMILY MEDICINE

## 2021-11-06 PROCEDURE — 36415 COLL VENOUS BLD VENIPUNCTURE: CPT | Mod: PO | Performed by: FAMILY MEDICINE

## 2021-11-06 PROCEDURE — 80061 LIPID PANEL: CPT | Performed by: FAMILY MEDICINE

## 2021-11-15 ENCOUNTER — OFFICE VISIT (OUTPATIENT)
Dept: FAMILY MEDICINE | Facility: CLINIC | Age: 71
End: 2021-11-15
Payer: COMMERCIAL

## 2021-11-15 VITALS
HEART RATE: 91 BPM | HEIGHT: 66 IN | DIASTOLIC BLOOD PRESSURE: 80 MMHG | SYSTOLIC BLOOD PRESSURE: 138 MMHG | TEMPERATURE: 98 F | BODY MASS INDEX: 23.08 KG/M2 | OXYGEN SATURATION: 95 %

## 2021-11-15 DIAGNOSIS — E03.9 HYPOTHYROIDISM, UNSPECIFIED TYPE: Chronic | ICD-10-CM

## 2021-11-15 DIAGNOSIS — E78.00 HYPERCHOLESTEREMIA: Chronic | ICD-10-CM

## 2021-11-15 DIAGNOSIS — Z00.00 WELLNESS EXAMINATION: Primary | ICD-10-CM

## 2021-11-15 PROCEDURE — 99999 PR PBB SHADOW E&M-EST. PATIENT-LVL III: CPT | Mod: PBBFAC,,, | Performed by: FAMILY MEDICINE

## 2021-11-15 PROCEDURE — 1159F MED LIST DOCD IN RCRD: CPT | Mod: CPTII,S$GLB,, | Performed by: FAMILY MEDICINE

## 2021-11-15 PROCEDURE — 90471 IMMUNIZATION ADMIN: CPT | Mod: S$GLB,,, | Performed by: FAMILY MEDICINE

## 2021-11-15 PROCEDURE — 1101F PT FALLS ASSESS-DOCD LE1/YR: CPT | Mod: CPTII,S$GLB,, | Performed by: FAMILY MEDICINE

## 2021-11-15 PROCEDURE — 1160F PR REVIEW ALL MEDS BY PRESCRIBER/CLIN PHARMACIST DOCUMENTED: ICD-10-PCS | Mod: CPTII,S$GLB,, | Performed by: FAMILY MEDICINE

## 2021-11-15 PROCEDURE — 90694 FLU VACCINE - QUADRIVALENT - ADJUVANTED: ICD-10-PCS | Mod: S$GLB,,, | Performed by: FAMILY MEDICINE

## 2021-11-15 PROCEDURE — 90471 FLU VACCINE - QUADRIVALENT - ADJUVANTED: ICD-10-PCS | Mod: S$GLB,,, | Performed by: FAMILY MEDICINE

## 2021-11-15 PROCEDURE — 90694 VACC AIIV4 NO PRSRV 0.5ML IM: CPT | Mod: S$GLB,,, | Performed by: FAMILY MEDICINE

## 2021-11-15 PROCEDURE — 3288F PR FALLS RISK ASSESSMENT DOCUMENTED: ICD-10-PCS | Mod: CPTII,S$GLB,, | Performed by: FAMILY MEDICINE

## 2021-11-15 PROCEDURE — 3075F SYST BP GE 130 - 139MM HG: CPT | Mod: CPTII,S$GLB,, | Performed by: FAMILY MEDICINE

## 2021-11-15 PROCEDURE — 3008F BODY MASS INDEX DOCD: CPT | Mod: CPTII,S$GLB,, | Performed by: FAMILY MEDICINE

## 2021-11-15 PROCEDURE — 1101F PR PT FALLS ASSESS DOC 0-1 FALLS W/OUT INJ PAST YR: ICD-10-PCS | Mod: CPTII,S$GLB,, | Performed by: FAMILY MEDICINE

## 2021-11-15 PROCEDURE — 1160F RVW MEDS BY RX/DR IN RCRD: CPT | Mod: CPTII,S$GLB,, | Performed by: FAMILY MEDICINE

## 2021-11-15 PROCEDURE — 99397 PER PM REEVAL EST PAT 65+ YR: CPT | Mod: 25,S$GLB,, | Performed by: FAMILY MEDICINE

## 2021-11-15 PROCEDURE — 99397 PR PREVENTIVE VISIT,EST,65 & OVER: ICD-10-PCS | Mod: 25,S$GLB,, | Performed by: FAMILY MEDICINE

## 2021-11-15 PROCEDURE — 3288F FALL RISK ASSESSMENT DOCD: CPT | Mod: CPTII,S$GLB,, | Performed by: FAMILY MEDICINE

## 2021-11-15 PROCEDURE — 1159F PR MEDICATION LIST DOCUMENTED IN MEDICAL RECORD: ICD-10-PCS | Mod: CPTII,S$GLB,, | Performed by: FAMILY MEDICINE

## 2021-11-15 PROCEDURE — 99999 PR PBB SHADOW E&M-EST. PATIENT-LVL III: ICD-10-PCS | Mod: PBBFAC,,, | Performed by: FAMILY MEDICINE

## 2021-11-15 PROCEDURE — 3079F DIAST BP 80-89 MM HG: CPT | Mod: CPTII,S$GLB,, | Performed by: FAMILY MEDICINE

## 2021-11-15 PROCEDURE — 3008F PR BODY MASS INDEX (BMI) DOCUMENTED: ICD-10-PCS | Mod: CPTII,S$GLB,, | Performed by: FAMILY MEDICINE

## 2021-11-15 PROCEDURE — 3079F PR MOST RECENT DIASTOLIC BLOOD PRESSURE 80-89 MM HG: ICD-10-PCS | Mod: CPTII,S$GLB,, | Performed by: FAMILY MEDICINE

## 2021-11-15 PROCEDURE — 3075F PR MOST RECENT SYSTOLIC BLOOD PRESS GE 130-139MM HG: ICD-10-PCS | Mod: CPTII,S$GLB,, | Performed by: FAMILY MEDICINE

## 2021-11-15 RX ORDER — CELECOXIB 100 MG/1
100 CAPSULE ORAL DAILY PRN
Qty: 30 CAPSULE | Refills: 2 | Status: SHIPPED | OUTPATIENT
Start: 2021-11-15 | End: 2024-01-04

## 2021-11-15 RX ORDER — BIMATOPROST 3 UG/ML
SOLUTION TOPICAL
Qty: 5 ML | Refills: 1 | Status: SHIPPED | OUTPATIENT
Start: 2021-11-15 | End: 2023-06-06

## 2021-11-17 DIAGNOSIS — E03.4 HYPOTHYROIDISM DUE TO ACQUIRED ATROPHY OF THYROID: Chronic | ICD-10-CM

## 2021-11-17 RX ORDER — LEVOTHYROXINE SODIUM 50 UG/1
50 TABLET ORAL DAILY
Qty: 90 TABLET | Refills: 3 | Status: SHIPPED | OUTPATIENT
Start: 2021-11-17 | End: 2022-09-20

## 2021-11-18 ENCOUNTER — DOCUMENTATION ONLY (OUTPATIENT)
Dept: FAMILY MEDICINE | Facility: CLINIC | Age: 71
End: 2021-11-18
Payer: COMMERCIAL

## 2022-01-06 ENCOUNTER — PATIENT OUTREACH (OUTPATIENT)
Dept: ADMINISTRATIVE | Facility: OTHER | Age: 72
End: 2022-01-06
Payer: COMMERCIAL

## 2022-01-06 NOTE — PROGRESS NOTES
Health Maintenance Due   Topic Date Due    Pneumococcal Vaccines (Age 65+) (2 of 2 - PPSV23) 11/20/2019     Updates were requested from care everywhere.  Chart was reviewed for overdue [facility]ters ([facility]) topics (CRS, Breast Cancer Screening, Eye exam)  Health Maintenance has been updated.  LINKS immunization registry triggered.  Immunizations were reconciled.

## 2022-01-07 ENCOUNTER — OFFICE VISIT (OUTPATIENT)
Dept: PHYSICAL MEDICINE AND REHAB | Facility: CLINIC | Age: 72
End: 2022-01-07
Payer: COMMERCIAL

## 2022-01-07 VITALS — BODY MASS INDEX: 22.99 KG/M2 | HEIGHT: 66 IN | WEIGHT: 143.06 LBS

## 2022-01-07 DIAGNOSIS — M67.952 TENDINOPATHY OF LEFT GLUTEUS MEDIUS: ICD-10-CM

## 2022-01-07 DIAGNOSIS — M17.0 PRIMARY OSTEOARTHRITIS OF BOTH KNEES: Primary | ICD-10-CM

## 2022-01-07 PROCEDURE — 1159F PR MEDICATION LIST DOCUMENTED IN MEDICAL RECORD: ICD-10-PCS | Mod: CPTII,S$GLB,, | Performed by: PHYSICAL MEDICINE & REHABILITATION

## 2022-01-07 PROCEDURE — 1101F PR PT FALLS ASSESS DOC 0-1 FALLS W/OUT INJ PAST YR: ICD-10-PCS | Mod: CPTII,S$GLB,, | Performed by: PHYSICAL MEDICINE & REHABILITATION

## 2022-01-07 PROCEDURE — 3288F FALL RISK ASSESSMENT DOCD: CPT | Mod: CPTII,S$GLB,, | Performed by: PHYSICAL MEDICINE & REHABILITATION

## 2022-01-07 PROCEDURE — 1160F RVW MEDS BY RX/DR IN RCRD: CPT | Mod: CPTII,S$GLB,, | Performed by: PHYSICAL MEDICINE & REHABILITATION

## 2022-01-07 PROCEDURE — 1101F PT FALLS ASSESS-DOCD LE1/YR: CPT | Mod: CPTII,S$GLB,, | Performed by: PHYSICAL MEDICINE & REHABILITATION

## 2022-01-07 PROCEDURE — 99213 OFFICE O/P EST LOW 20 MIN: CPT | Mod: 25,S$GLB,, | Performed by: PHYSICAL MEDICINE & REHABILITATION

## 2022-01-07 PROCEDURE — 1159F MED LIST DOCD IN RCRD: CPT | Mod: CPTII,S$GLB,, | Performed by: PHYSICAL MEDICINE & REHABILITATION

## 2022-01-07 PROCEDURE — 3008F PR BODY MASS INDEX (BMI) DOCUMENTED: ICD-10-PCS | Mod: CPTII,S$GLB,, | Performed by: PHYSICAL MEDICINE & REHABILITATION

## 2022-01-07 PROCEDURE — 3008F BODY MASS INDEX DOCD: CPT | Mod: CPTII,S$GLB,, | Performed by: PHYSICAL MEDICINE & REHABILITATION

## 2022-01-07 PROCEDURE — 99999 PR PBB SHADOW E&M-EST. PATIENT-LVL III: ICD-10-PCS | Mod: PBBFAC,,, | Performed by: PHYSICAL MEDICINE & REHABILITATION

## 2022-01-07 PROCEDURE — 3288F PR FALLS RISK ASSESSMENT DOCUMENTED: ICD-10-PCS | Mod: CPTII,S$GLB,, | Performed by: PHYSICAL MEDICINE & REHABILITATION

## 2022-01-07 PROCEDURE — 1160F PR REVIEW ALL MEDS BY PRESCRIBER/CLIN PHARMACIST DOCUMENTED: ICD-10-PCS | Mod: CPTII,S$GLB,, | Performed by: PHYSICAL MEDICINE & REHABILITATION

## 2022-01-07 PROCEDURE — 1125F PR PAIN SEVERITY QUANTIFIED, PAIN PRESENT: ICD-10-PCS | Mod: CPTII,S$GLB,, | Performed by: PHYSICAL MEDICINE & REHABILITATION

## 2022-01-07 PROCEDURE — 99999 PR PBB SHADOW E&M-EST. PATIENT-LVL III: CPT | Mod: PBBFAC,,, | Performed by: PHYSICAL MEDICINE & REHABILITATION

## 2022-01-07 PROCEDURE — 1125F AMNT PAIN NOTED PAIN PRSNT: CPT | Mod: CPTII,S$GLB,, | Performed by: PHYSICAL MEDICINE & REHABILITATION

## 2022-01-07 PROCEDURE — 99213 PR OFFICE/OUTPT VISIT, EST, LEVL III, 20-29 MIN: ICD-10-PCS | Mod: 25,S$GLB,, | Performed by: PHYSICAL MEDICINE & REHABILITATION

## 2022-01-07 PROCEDURE — 20611 DRAIN/INJ JOINT/BURSA W/US: CPT | Mod: LT,S$GLB,, | Performed by: PHYSICAL MEDICINE & REHABILITATION

## 2022-01-07 PROCEDURE — 20611 LARGE JOINT ASPIRATION/INJECTION: L GREATER TROCHANTERIC BURSA: ICD-10-PCS | Mod: LT,S$GLB,, | Performed by: PHYSICAL MEDICINE & REHABILITATION

## 2022-01-07 RX ORDER — BETAMETHASONE SODIUM PHOSPHATE AND BETAMETHASONE ACETATE 3; 3 MG/ML; MG/ML
6 INJECTION, SUSPENSION INTRA-ARTICULAR; INTRALESIONAL; INTRAMUSCULAR; SOFT TISSUE
Status: DISCONTINUED | OUTPATIENT
Start: 2022-01-07 | End: 2022-01-08 | Stop reason: HOSPADM

## 2022-01-07 RX ADMIN — BETAMETHASONE SODIUM PHOSPHATE AND BETAMETHASONE ACETATE 6 MG: 3; 3 INJECTION, SUSPENSION INTRA-ARTICULAR; INTRALESIONAL; INTRAMUSCULAR; SOFT TISSUE at 02:01

## 2022-01-08 NOTE — PROCEDURES
Large Joint Aspiration/Injection: L greater trochanteric bursa    Date/Time: 1/7/2022 2:30 PM  Performed by: Kieran Shay MD  Authorized by: Kieran Shay MD     Consent Done?:  Yes (Verbal)  Indications:  Pain  Site marked: the procedure site was marked    Timeout: prior to procedure the correct patient, procedure, and site was verified    Prep: patient was prepped and draped in usual sterile fashion    Local anesthesia used?: No      Details:  Needle Size:  22 G  Ultrasonic Guidance for needle placement?: Yes    Images are saved and documented.  Approach: in plane, lat to med.  Location:  Hip  Site:  L greater trochanteric bursa  Medications:  6 mg betamethasone acetate-betamethasone sodium phosphate 6 mg/mL  Patient tolerance:  Patient tolerated the procedure well with no immediate complications     Ultrasound guidance was used for correct needle placement, the images were saved will be uploaded to EMR.

## 2022-01-08 NOTE — PROGRESS NOTES
[facility]    CHIEF COMPLAINT:   Chief Complaint   Patient presents with    Hip Pain     Left hip     HISTORY OF PRESENT ILLNESS: Opal Sahu is a 71 y.o. female who presents to me for evaluation of left hip pain.  She previously underwent left gluteal bursa injection in November of 2020. She feels this injection was beneficial.  She feels she has been experiencing similar pains over the lateral left hip as of late.  She denies any significant trauma or injury event.  There is no significant radiation down the leg.    She has also been experiencing increasing bilateral knee pain.  She has received injections of hyaluronic acid in the past with good relief.  She feels those injections have began to wore off and she is interested in repeating the injections of hyaluronic acid.    Review of Systems   Constitutional: Negative for fever.   HENT: Negative for drooling.    Eyes: Negative for discharge.   Respiratory: Negative for choking.    Cardiovascular: Negative for chest pain.   Genitourinary: Negative for flank pain.   Skin: Negative for wound.   Allergic/Immunologic: Negative for immunocompromised state.   Neurological: Negative for tremors and syncope.   Psychiatric/Behavioral: Negative for behavioral problems.     Past Medical History:   Past Medical History:   Diagnosis Date    Hyperlipidemia     Hypothyroid        Past Surgical History:   Past Surgical History:   Procedure Laterality Date    None         Family History:   Family History   Problem Relation Age of Onset    Heart disease Mother     Cancer Father     Heart disease Brother     Breast cancer Other        Medications:   Current Outpatient Medications on File Prior to Visit   Medication Sig Dispense Refill    aspirin 81 mg Tab Every day      atorvastatin (LIPITOR) 10 MG tablet TAKE ONE TABLET BY MOUTH ONCE DAILY 90 tablet 2    azelastine (ASTELIN) 137 mcg (0.1 %) nasal spray 1 spray (137 mcg total) by Nasal route 2  "(two) times daily. 30 mL 0    bimatoprost (LATISSE) 0.03 % ophthalmic solution Place one drop on applicator and apply evenly along the skin of the upper eyelid at base of eyelashes once daily at bedtime; repeat procedure for second eye (use a clean applicator). 5 mL 1    celecoxib (CELEBREX) 100 MG capsule Take 1 capsule (100 mg total) by mouth daily as needed for Pain. 30 capsule 2    mupirocin (BACTROBAN) 2 % ointment Apply topically 2 (two) times daily. 22 g 1    mupirocin calcium 2% (BACTROBAN) 2 % cream Apply topically 2 (two) times daily. Apply to woundTwice a day to the wound. 30 g 6    SYNTHROID 50 mcg tablet Take 1 tablet (50 mcg total) by mouth once daily. 90 tablet 3    tretinoin (RETIN-A) 0.1 % cream Apply topically every evening. 45 g 1    valACYclovir (VALTREX) 1000 MG tablet Take 2 tablets (2,000 mg total) by mouth 2 (two) times daily. for 1 day 30 tablet 1     No current facility-administered medications on file prior to visit.       Allergies:   Review of patient's allergies indicates:   Allergen Reactions    Povidone-iodine      Other reaction(s): Unknown    Sulfa (sulfonamide antibiotics)      Pharmacy notified us       Social History:   Social History     Socioeconomic History    Marital status:    Occupational History     Employer: [employer]   Tobacco Use    Smoking status: Never Smoker   Substance and Sexual Activity    Alcohol use: Yes    Drug use: No     PHYSICAL EXAMINATION:   General    Vitals:    01/07/22 1445   Weight: 64.9 kg (143 lb 1.3 oz)   Height: 5' 6" (1.676 m)     Constitutional: Oriented to person, place, and time. No apparent distress. Pleasant.  HENT:   Head: Normocephalic and atraumatic.   Eyes: Right eye exhibits no discharge. Left eye exhibits no discharge. No scleral icterus.   Pulmonary/Chest: Effort normal. No respiratory distress.   Abdominal: There is no guarding.   Neurological: Alert and oriented to person, place, and time. "   Psychiatric: Behavior is normal.   Right Knee Exam     Other   Erythema: absent  Scars: absent  Sensation: normal  Pulse: present  Swelling: none      Left Knee Exam     Other   Erythema: absent  Scars: absent  Sensation: normal  Pulse: present  Swelling: none      Left Hip Exam     Tenderness   The patient is experiencing tenderness in the greater trochanter and lateral.    Range of Motion   Abduction: 45   Adduction: 25   Extension: 10   Left hip flexion: 115.   Internal rotation: 25     Muscle Strength   Abduction: 5/5   Adduction: 5/5   Flexion: 5/5     Tests   ALISIA: negative    Other   Erythema: absent  Scars: absent  Sensation: normal  Pulse: present    Comments:  -log roll  -ALISIA  -FADIR  No TTP over SIJ or pirformis        INSPECTION: There is no swelling, ecchymoses, erythema or gross deformity.  LIGAMENTOUS LAXITY AND STABILITY: Negative ALISIA test. No pain with SI joint compression. Negative log roll.  GAIT/DYNAMIC: symmetric gait without compensaton    Imaging  X-ray of the knees from 2017: Osteoarthritic changes in both knees with progression particularly in the right medial compartment relative to 09/16/2014.    ASSESSMENT:   1. Primary osteoarthritis of both knees    2. Tendinopathy of left gluteus medius      PLAN:     1. In terms of her hip, her symptoms and exam are consistent with gluteal tendinopathy as the primary generator of her pain.  She has responded well the past 2 targeted injection of the area which is to repeat this today.  See separate procedure note for ultrasound-guided injection of corticosteroid to the left gluteal bursa.    2. In terms of her knees, she also received good results in the past from injection of hyaluronic acid.  The effects seem to be wearing off and I do believe it is reasonable to repeat these injections to promote intra-articular health in slow the progression of further degenerative changes.  We will seek insurance prior authorization for bilateral knee  injections of hyaluronic acid.    3. RTC in the next 1-2 weeks for bilateral knee injections of hyaluronic acid.    The above note was completed, in part, with the aid of Dragon dictation software/hardware. Translation errors may be present.

## 2022-02-02 ENCOUNTER — TELEPHONE (OUTPATIENT)
Dept: PHYSICAL MEDICINE AND REHAB | Facility: CLINIC | Age: 72
End: 2022-02-02
Payer: COMMERCIAL

## 2022-02-02 ENCOUNTER — PATIENT MESSAGE (OUTPATIENT)
Dept: PHYSICAL MEDICINE AND REHAB | Facility: CLINIC | Age: 72
End: 2022-02-02

## 2022-02-02 ENCOUNTER — OFFICE VISIT (OUTPATIENT)
Dept: PHYSICAL MEDICINE AND REHAB | Facility: CLINIC | Age: 72
End: 2022-02-02
Payer: COMMERCIAL

## 2022-02-02 VITALS — WEIGHT: 143 LBS | HEIGHT: 66 IN | RESPIRATION RATE: 16 BRPM | BODY MASS INDEX: 22.98 KG/M2

## 2022-02-02 DIAGNOSIS — M17.0 PRIMARY OSTEOARTHRITIS OF BOTH KNEES: Primary | ICD-10-CM

## 2022-02-02 PROCEDURE — 1101F PT FALLS ASSESS-DOCD LE1/YR: CPT | Mod: CPTII,S$GLB,, | Performed by: PHYSICAL MEDICINE & REHABILITATION

## 2022-02-02 PROCEDURE — 99499 UNLISTED E&M SERVICE: CPT | Mod: S$GLB,,, | Performed by: PHYSICAL MEDICINE & REHABILITATION

## 2022-02-02 PROCEDURE — 3008F BODY MASS INDEX DOCD: CPT | Mod: CPTII,S$GLB,, | Performed by: PHYSICAL MEDICINE & REHABILITATION

## 2022-02-02 PROCEDURE — 99499 NO LOS: ICD-10-PCS | Mod: S$GLB,,, | Performed by: PHYSICAL MEDICINE & REHABILITATION

## 2022-02-02 PROCEDURE — 99999 PR PBB SHADOW E&M-EST. PATIENT-LVL III: CPT | Mod: PBBFAC,,, | Performed by: PHYSICAL MEDICINE & REHABILITATION

## 2022-02-02 PROCEDURE — 1101F PR PT FALLS ASSESS DOC 0-1 FALLS W/OUT INJ PAST YR: ICD-10-PCS | Mod: CPTII,S$GLB,, | Performed by: PHYSICAL MEDICINE & REHABILITATION

## 2022-02-02 PROCEDURE — 3288F FALL RISK ASSESSMENT DOCD: CPT | Mod: CPTII,S$GLB,, | Performed by: PHYSICAL MEDICINE & REHABILITATION

## 2022-02-02 PROCEDURE — 1159F MED LIST DOCD IN RCRD: CPT | Mod: CPTII,S$GLB,, | Performed by: PHYSICAL MEDICINE & REHABILITATION

## 2022-02-02 PROCEDURE — 1159F PR MEDICATION LIST DOCUMENTED IN MEDICAL RECORD: ICD-10-PCS | Mod: CPTII,S$GLB,, | Performed by: PHYSICAL MEDICINE & REHABILITATION

## 2022-02-02 PROCEDURE — 20611 LARGE JOINT ASPIRATION/INJECTION: BILATERAL KNEE: ICD-10-PCS | Mod: 50,S$GLB,, | Performed by: PHYSICAL MEDICINE & REHABILITATION

## 2022-02-02 PROCEDURE — 3008F PR BODY MASS INDEX (BMI) DOCUMENTED: ICD-10-PCS | Mod: CPTII,S$GLB,, | Performed by: PHYSICAL MEDICINE & REHABILITATION

## 2022-02-02 PROCEDURE — 1160F PR REVIEW ALL MEDS BY PRESCRIBER/CLIN PHARMACIST DOCUMENTED: ICD-10-PCS | Mod: CPTII,S$GLB,, | Performed by: PHYSICAL MEDICINE & REHABILITATION

## 2022-02-02 PROCEDURE — 1160F RVW MEDS BY RX/DR IN RCRD: CPT | Mod: CPTII,S$GLB,, | Performed by: PHYSICAL MEDICINE & REHABILITATION

## 2022-02-02 PROCEDURE — 20611 DRAIN/INJ JOINT/BURSA W/US: CPT | Mod: 50,S$GLB,, | Performed by: PHYSICAL MEDICINE & REHABILITATION

## 2022-02-02 PROCEDURE — 99999 PR PBB SHADOW E&M-EST. PATIENT-LVL III: ICD-10-PCS | Mod: PBBFAC,,, | Performed by: PHYSICAL MEDICINE & REHABILITATION

## 2022-02-02 PROCEDURE — 3288F PR FALLS RISK ASSESSMENT DOCUMENTED: ICD-10-PCS | Mod: CPTII,S$GLB,, | Performed by: PHYSICAL MEDICINE & REHABILITATION

## 2022-02-02 PROCEDURE — 1126F AMNT PAIN NOTED NONE PRSNT: CPT | Mod: CPTII,S$GLB,, | Performed by: PHYSICAL MEDICINE & REHABILITATION

## 2022-02-02 PROCEDURE — 1126F PR PAIN SEVERITY QUANTIFIED, NO PAIN PRESENT: ICD-10-PCS | Mod: CPTII,S$GLB,, | Performed by: PHYSICAL MEDICINE & REHABILITATION

## 2022-02-02 NOTE — TELEPHONE ENCOUNTER
----- Message from Jack Nowak sent at 2/2/2022 11:24 AM CST -----  Type:  Patient Returning Call  Who Called:  Patient   Who Left Message for Patient:  not sure  Does the patient know what this is regarding?:  not sure  Best Call Back Number:  [phone]  Additional Information:  Pt may have missed call, Pt not sure for what reason.Pt is scheduled for 4:00 today with provider.

## 2022-02-04 NOTE — PROCEDURES
Large Joint Aspiration/Injection: bilateral knee    Date/Time: 2/2/2022 4:00 PM  Performed by: Kieran Shay MD  Authorized by: Kieran Shay MD     Consent Done?:  Yes (Verbal)  Indications:  Pain and arthritis  Site marked: the procedure site was marked    Timeout: prior to procedure the correct patient, procedure, and site was verified    Prep: patient was prepped and draped in usual sterile fashion    Local anesthesia used?: No      Details:  Needle Size:  22 G  Ultrasonic Guidance for needle placement?: Yes    Images are saved and documented.  Approach: in plane, lat to med.  Location:  Knee  Laterality:  Bilateral  Site:  Bilateral knee  Medications (Right):  48 mg hylan g-f 20 48 mg/6 mL  Medications (Left):  48 mg hylan g-f 20 48 mg/6 mL  Patient tolerance:  Patient tolerated the procedure well with no immediate complications     Ultrasound guidance was used for correct needle placement, the images were saved will be uploaded to EMR.

## 2022-02-04 NOTE — PROGRESS NOTES
[facility]    CHIEF COMPLAINT:   Chief Complaint   Patient presents with    Knee Pain     Bilateral knee pain/ synvisc one     HISTORY OF PRESENT ILLNESS: Opal Sahu is a 71 y.o. female who presents to me for scheduled injections of hyaluronic acid to both knees.  She reports persisting knee pain since her last clinic visit.    Review of Systems   Constitutional: Negative for fever.   HENT: Negative for drooling.    Eyes: Negative for discharge.   Respiratory: Negative for choking.    Cardiovascular: Negative for chest pain.   Genitourinary: Negative for flank pain.   Skin: Negative for wound.   Allergic/Immunologic: Negative for immunocompromised state.   Neurological: Negative for tremors and syncope.   Psychiatric/Behavioral: Negative for behavioral problems.     Past Medical History:   Past Medical History:   Diagnosis Date    Hyperlipidemia     Hypothyroid        Past Surgical History:   Past Surgical History:   Procedure Laterality Date    None         Family History:   Family History   Problem Relation Age of Onset    Heart disease Mother     Cancer Father     Heart disease Brother     Breast cancer Other        Medications:   Current Outpatient Medications on File Prior to Visit   Medication Sig Dispense Refill    aspirin 81 mg Tab Every day      atorvastatin (LIPITOR) 10 MG tablet TAKE ONE TABLET BY MOUTH ONCE DAILY 90 tablet 2    azelastine (ASTELIN) 137 mcg (0.1 %) nasal spray 1 spray (137 mcg total) by Nasal route 2 (two) times daily. 30 mL 0    bimatoprost (LATISSE) 0.03 % ophthalmic solution Place one drop on applicator and apply evenly along the skin of the upper eyelid at base of eyelashes once daily at bedtime; repeat procedure for second eye (use a clean applicator). 5 mL 1    celecoxib (CELEBREX) 100 MG capsule Take 1 capsule (100 mg total) by mouth daily as needed for Pain. 30 capsule 2    mupirocin (BACTROBAN) 2 % ointment Apply topically 2 (two) times  "daily. 22 g 1    mupirocin calcium 2% (BACTROBAN) 2 % cream Apply topically 2 (two) times daily. Apply to woundTwice a day to the wound. 30 g 6    SYNTHROID 50 mcg tablet Take 1 tablet (50 mcg total) by mouth once daily. 90 tablet 3    tretinoin (RETIN-A) 0.1 % cream Apply topically every evening. 45 g 1    valACYclovir (VALTREX) 1000 MG tablet Take 2 tablets (2,000 mg total) by mouth 2 (two) times daily. for 1 day 30 tablet 1     No current facility-administered medications on file prior to visit.       Allergies:   Review of patient's allergies indicates:   Allergen Reactions    Povidone-iodine      Other reaction(s): Unknown    Sulfa (sulfonamide antibiotics)      Pharmacy notified us     Social History:   Social History     Socioeconomic History    Marital status:    Occupational History     Employer: [employer]   Tobacco Use    Smoking status: Never Smoker   Substance and Sexual Activity    Alcohol use: Yes    Drug use: No     PHYSICAL EXAMINATION:   General    Vitals:    02/02/22 1438   Resp: 16   Weight: 64.9 kg (143 lb)   Height: 5' 6" (1.676 m)     Constitutional: Oriented to person, place, and time. No apparent distress. Pleasant.  HENT:   Head: Normocephalic and atraumatic.   Eyes: Right eye exhibits no discharge. Left eye exhibits no discharge. No scleral icterus.   Pulmonary/Chest: Effort normal. No respiratory distress.   Abdominal: There is no guarding.   Neurological: Alert and oriented to person, place, and time.   Psychiatric: Behavior is normal.   Right Knee Exam     Other   Erythema: absent  Scars: absent  Sensation: normal  Pulse: present  Swelling: none      Left Knee Exam     Other   Erythema: absent  Scars: absent  Sensation: normal  Pulse: present  Swelling: none        INSPECTION: There is no swelling, ecchymoses, erythema or gross deformity.  GAIT/DYNAMIC: symmetric gait without compensaton    Imaging  X-ray of the knees from 2017: Osteoarthritic changes in " both knees with progression particularly in the right medial compartment relative to 09/16/2014.    ASSESSMENT:   1. Primary osteoarthritis of both knees      PLAN:     1. We proceed today with scheduled injection of hyaluronic acid both knees under ultrasound guidance, see separate procedure note.    2. RTC as needed.    The above note was completed, in part, with the aid of Dragon dictation software/hardware. Translation errors may be present.

## 2022-02-22 DIAGNOSIS — E78.00 HYPERCHOLESTEREMIA: ICD-10-CM

## 2022-02-22 NOTE — TELEPHONE ENCOUNTER
No new care gaps identified.  Powered by [employer]. Reference number: [id number].   2/22/2022 2:53:26 PM CST

## 2022-02-23 NOTE — TELEPHONE ENCOUNTER
Refill Routing Note   Medication(s) are not appropriate for processing by [facility] for the following reason(s):      - Allergy/Contraindication (SYNTHROID, valACYclovir, atorvastatin, celecoxib)    [facility] action(s):  Defer       Medication Therapy Plan: Allergy warning- patient reports allergy to drug in the same class; defer  --->Care Gap information included in message below if applicable.   Medication reconciliation completed: No   Automatic Epic Generated Protocol Data:        Requested Prescriptions   Pending Prescriptions Disp Refills    atorvastatin (LIPITOR) 10 MG tablet [Pharmacy Med Name: atorvastatin 10 mg tablet] 90 tablet 2     Sig: TAKE ONE TABLET BY MOUTH ONCE DAILY       Cardiovascular:  Antilipid - Statins Passed - 2/22/2022  2:53 PM        Passed - Patient is at least 18 years old        Passed - Valid encounter within last 15 months     Recent Visits  Date Type Provider Dept   11/15/21 Office Visit JOLENE Bailey MD [facility]   05/10/21 Office Visit JOLENE Bailey MD [facility]   10/08/20 Office Visit JOLENE Bailey MD [facility]   Showing recent visits within past 720 days and meeting all other requirements  Future Appointments  No visits were found meeting these conditions.  Showing future appointments within next 150 days and meeting all other requirements      Future Appointments              In 2 months LAB, [facility]  Lab, [facility]    In 3 months JOLENE Bailey MD [facility]                Passed - ALT is 131 or below and within 360 days     ALT   Date Value Ref Range Status   11/06/2021 16 10 - 44 U/L Final   03/26/2021 14 10 - 44 U/L Final   08/13/2020 13 10 - 44 U/L Final              Passed - AST is 119 or below and within 360 days     AST   Date Value Ref Range Status   11/06/2021 13 10 - 40 U/L Final   03/26/2021 13 10 - 40 U/L Final   08/13/2020 13 10 - 40 U/L Final               Passed - Total Cholesterol within 360 days     Lab Results   Component Value Date    CHOL 181 11/06/2021    CHOL 189 03/26/2021    CHOL 214 (H) 08/13/2020              Passed - LDL within 360 days     LDL Cholesterol   Date Value Ref Range Status   11/06/2021 110.8 63.0 - 159.0 mg/dL Final     Comment:     The National Cholesterol Education Program (NCEP) has set the  following guidelines (reference values) for LDL Cholesterol:  Optimal.......................<130 mg/dL  Borderline High...............130-159 mg/dL  High..........................160-189 mg/dL  Very High.....................>190 mg/dL              Passed - HDL within 360 days     HDL   Date Value Ref Range Status   11/06/2021 55 40 - 75 mg/dL Final     Comment:     The National Cholesterol Education Program (NCEP) has set the  following guidelines (reference values) for HDL Cholesterol:  Low...............<40 mg/dL  Optimal...........>60 mg/dL              Passed - Triglycerides within 360 days     Lab Results   Component Value Date    TRIG 76 11/06/2021    TRIG 107 03/26/2021    TRIG 99 08/13/2020                    Appointments  past 12m or future 3m with PCP    Date Provider   Last Visit   11/15/2021 JOLENE Bailey MD   Next Visit   5/26/2022 JOLENE Bailey MD   ED visits in past 90 days: 0        Note composed:4:50 PM 02/23/2022

## 2022-02-25 RX ORDER — ATORVASTATIN CALCIUM 10 MG/1
TABLET, FILM COATED ORAL
Qty: 90 TABLET | Refills: 2 | Status: SHIPPED | OUTPATIENT
Start: 2022-02-25 | End: 2022-11-20

## 2022-05-09 ENCOUNTER — PATIENT MESSAGE (OUTPATIENT)
Dept: SMOKING CESSATION | Facility: CLINIC | Age: 72
End: 2022-05-09
Payer: COMMERCIAL

## 2022-05-24 ENCOUNTER — LAB VISIT (OUTPATIENT)
Dept: LAB | Facility: HOSPITAL | Age: 72
End: 2022-05-24
Attending: FAMILY MEDICINE
Payer: COMMERCIAL

## 2022-05-24 DIAGNOSIS — E03.9 HYPOTHYROIDISM, UNSPECIFIED TYPE: Chronic | ICD-10-CM

## 2022-05-24 DIAGNOSIS — E78.00 HYPERCHOLESTEREMIA: Chronic | ICD-10-CM

## 2022-05-24 LAB
ALBUMIN SERPL BCP-MCNC: 3.7 G/DL (ref 3.5–5.2)
ALP SERPL-CCNC: 58 U/L (ref 55–135)
ALT SERPL W/O P-5'-P-CCNC: 15 U/L (ref 10–44)
ANION GAP SERPL CALC-SCNC: 11 MMOL/L (ref 8–16)
AST SERPL-CCNC: 14 U/L (ref 10–40)
BASOPHILS # BLD AUTO: 0.05 K/UL (ref 0–0.2)
BASOPHILS NFR BLD: 0.9 % (ref 0–1.9)
BILIRUB SERPL-MCNC: 1.2 MG/DL (ref 0.1–1)
BUN SERPL-MCNC: 16 MG/DL (ref 8–23)
CALCIUM SERPL-MCNC: 9.3 MG/DL (ref 8.7–10.5)
CHLORIDE SERPL-SCNC: 104 MMOL/L (ref 95–110)
CHOLEST SERPL-MCNC: 191 MG/DL (ref 120–199)
CHOLEST/HDLC SERPL: 3.4 {RATIO} (ref 2–5)
CO2 SERPL-SCNC: 24 MMOL/L (ref 23–29)
CREAT SERPL-MCNC: 0.6 MG/DL (ref 0.5–1.4)
DIFFERENTIAL METHOD: NORMAL
EOSINOPHIL # BLD AUTO: 0.4 K/UL (ref 0–0.5)
EOSINOPHIL NFR BLD: 7.1 % (ref 0–8)
ERYTHROCYTE [DISTWIDTH] IN BLOOD BY AUTOMATED COUNT: 12.6 % (ref 11.5–14.5)
EST. GFR  (AFRICAN AMERICAN): >60 ML/MIN/1.73 M^2
EST. GFR  (NON AFRICAN AMERICAN): >60 ML/MIN/1.73 M^2
GLUCOSE SERPL-MCNC: 107 MG/DL (ref 70–110)
HCT VFR BLD AUTO: 38.7 % (ref 37–48.5)
HDLC SERPL-MCNC: 56 MG/DL (ref 40–75)
HDLC SERPL: 29.3 % (ref 20–50)
HGB BLD-MCNC: 13 G/DL (ref 12–16)
IMM GRANULOCYTES # BLD AUTO: 0.01 K/UL (ref 0–0.04)
IMM GRANULOCYTES NFR BLD AUTO: 0.2 % (ref 0–0.5)
LDLC SERPL CALC-MCNC: 114.4 MG/DL (ref 63–159)
LYMPHOCYTES # BLD AUTO: 2 K/UL (ref 1–4.8)
LYMPHOCYTES NFR BLD: 34.3 % (ref 18–48)
MCH RBC QN AUTO: 30 PG (ref 27–31)
MCHC RBC AUTO-ENTMCNC: 33.6 G/DL (ref 32–36)
MCV RBC AUTO: 89 FL (ref 82–98)
MONOCYTES # BLD AUTO: 0.7 K/UL (ref 0.3–1)
MONOCYTES NFR BLD: 11.5 % (ref 4–15)
NEUTROPHILS # BLD AUTO: 2.7 K/UL (ref 1.8–7.7)
NEUTROPHILS NFR BLD: 46 % (ref 38–73)
NONHDLC SERPL-MCNC: 135 MG/DL
NRBC BLD-RTO: 0 /100 WBC
PLATELET # BLD AUTO: 377 K/UL (ref 150–450)
PMV BLD AUTO: 9.5 FL (ref 9.2–12.9)
POTASSIUM SERPL-SCNC: 4.1 MMOL/L (ref 3.5–5.1)
PROT SERPL-MCNC: 6.6 G/DL (ref 6–8.4)
RBC # BLD AUTO: 4.33 M/UL (ref 4–5.4)
SODIUM SERPL-SCNC: 139 MMOL/L (ref 136–145)
TRIGL SERPL-MCNC: 103 MG/DL (ref 30–150)
TSH SERPL DL<=0.005 MIU/L-ACNC: 0.73 UIU/ML (ref 0.4–4)
WBC # BLD AUTO: 5.81 K/UL (ref 3.9–12.7)

## 2022-05-24 PROCEDURE — 36415 COLL VENOUS BLD VENIPUNCTURE: CPT | Mod: PN | Performed by: FAMILY MEDICINE

## 2022-05-24 PROCEDURE — 84443 ASSAY THYROID STIM HORMONE: CPT | Performed by: FAMILY MEDICINE

## 2022-05-24 PROCEDURE — 85025 COMPLETE CBC W/AUTO DIFF WBC: CPT | Performed by: FAMILY MEDICINE

## 2022-05-24 PROCEDURE — 80061 LIPID PANEL: CPT | Performed by: FAMILY MEDICINE

## 2022-05-24 PROCEDURE — 80053 COMPREHEN METABOLIC PANEL: CPT | Performed by: FAMILY MEDICINE

## 2022-05-25 ENCOUNTER — TELEPHONE (OUTPATIENT)
Dept: FAMILY MEDICINE | Facility: CLINIC | Age: 72
End: 2022-05-25
Payer: COMMERCIAL

## 2022-05-25 NOTE — TELEPHONE ENCOUNTER
----- Message from Ventura Duenas sent at 5/25/2022  3:00 PM CDT -----  Contact: pt  Type: Needs Medical Advice    Who Called: pt  Best Call Back Number.[phone]   Requesting a call back regarding- pt asking for nurse call back.  Please Advise- Thank you

## 2022-05-30 ENCOUNTER — OFFICE VISIT (OUTPATIENT)
Dept: FAMILY MEDICINE | Facility: CLINIC | Age: 72
End: 2022-05-30
Payer: COMMERCIAL

## 2022-05-30 VITALS
BODY MASS INDEX: 22.99 KG/M2 | HEIGHT: 66 IN | OXYGEN SATURATION: 95 % | SYSTOLIC BLOOD PRESSURE: 138 MMHG | WEIGHT: 143.06 LBS | DIASTOLIC BLOOD PRESSURE: 82 MMHG | HEART RATE: 86 BPM

## 2022-05-30 DIAGNOSIS — M54.32 SCIATICA OF LEFT SIDE: ICD-10-CM

## 2022-05-30 DIAGNOSIS — E78.00 HYPERCHOLESTEREMIA: Primary | Chronic | ICD-10-CM

## 2022-05-30 DIAGNOSIS — E03.9 HYPOTHYROIDISM, UNSPECIFIED TYPE: Chronic | ICD-10-CM

## 2022-05-30 PROCEDURE — 99214 OFFICE O/P EST MOD 30 MIN: CPT | Mod: S$GLB,,, | Performed by: FAMILY MEDICINE

## 2022-05-30 PROCEDURE — 3008F BODY MASS INDEX DOCD: CPT | Mod: CPTII,S$GLB,, | Performed by: FAMILY MEDICINE

## 2022-05-30 PROCEDURE — 3075F PR MOST RECENT SYSTOLIC BLOOD PRESS GE 130-139MM HG: ICD-10-PCS | Mod: CPTII,S$GLB,, | Performed by: FAMILY MEDICINE

## 2022-05-30 PROCEDURE — 1101F PR PT FALLS ASSESS DOC 0-1 FALLS W/OUT INJ PAST YR: ICD-10-PCS | Mod: CPTII,S$GLB,, | Performed by: FAMILY MEDICINE

## 2022-05-30 PROCEDURE — 99999 PR PBB SHADOW E&M-EST. PATIENT-LVL III: CPT | Mod: PBBFAC,,, | Performed by: FAMILY MEDICINE

## 2022-05-30 PROCEDURE — 1159F MED LIST DOCD IN RCRD: CPT | Mod: CPTII,S$GLB,, | Performed by: FAMILY MEDICINE

## 2022-05-30 PROCEDURE — 99214 PR OFFICE/OUTPT VISIT, EST, LEVL IV, 30-39 MIN: ICD-10-PCS | Mod: S$GLB,,, | Performed by: FAMILY MEDICINE

## 2022-05-30 PROCEDURE — 3288F PR FALLS RISK ASSESSMENT DOCUMENTED: ICD-10-PCS | Mod: CPTII,S$GLB,, | Performed by: FAMILY MEDICINE

## 2022-05-30 PROCEDURE — 3075F SYST BP GE 130 - 139MM HG: CPT | Mod: CPTII,S$GLB,, | Performed by: FAMILY MEDICINE

## 2022-05-30 PROCEDURE — 1125F AMNT PAIN NOTED PAIN PRSNT: CPT | Mod: CPTII,S$GLB,, | Performed by: FAMILY MEDICINE

## 2022-05-30 PROCEDURE — 3079F PR MOST RECENT DIASTOLIC BLOOD PRESSURE 80-89 MM HG: ICD-10-PCS | Mod: CPTII,S$GLB,, | Performed by: FAMILY MEDICINE

## 2022-05-30 PROCEDURE — 99999 PR PBB SHADOW E&M-EST. PATIENT-LVL III: ICD-10-PCS | Mod: PBBFAC,,, | Performed by: FAMILY MEDICINE

## 2022-05-30 PROCEDURE — 3079F DIAST BP 80-89 MM HG: CPT | Mod: CPTII,S$GLB,, | Performed by: FAMILY MEDICINE

## 2022-05-30 PROCEDURE — 3008F PR BODY MASS INDEX (BMI) DOCUMENTED: ICD-10-PCS | Mod: CPTII,S$GLB,, | Performed by: FAMILY MEDICINE

## 2022-05-30 PROCEDURE — 3288F FALL RISK ASSESSMENT DOCD: CPT | Mod: CPTII,S$GLB,, | Performed by: FAMILY MEDICINE

## 2022-05-30 PROCEDURE — 1159F PR MEDICATION LIST DOCUMENTED IN MEDICAL RECORD: ICD-10-PCS | Mod: CPTII,S$GLB,, | Performed by: FAMILY MEDICINE

## 2022-05-30 PROCEDURE — 1101F PT FALLS ASSESS-DOCD LE1/YR: CPT | Mod: CPTII,S$GLB,, | Performed by: FAMILY MEDICINE

## 2022-05-30 PROCEDURE — 1125F PR PAIN SEVERITY QUANTIFIED, PAIN PRESENT: ICD-10-PCS | Mod: CPTII,S$GLB,, | Performed by: FAMILY MEDICINE

## 2022-05-30 RX ORDER — MUPIROCIN CALCIUM 20 MG/G
CREAM TOPICAL 2 TIMES DAILY
Qty: 30 G | Refills: 5 | Status: SHIPPED | OUTPATIENT
Start: 2022-05-30 | End: 2024-01-04

## 2022-05-30 RX ORDER — TRETINOIN 1 MG/G
CREAM TOPICAL NIGHTLY
Qty: 45 G | Refills: 1 | Status: CANCELLED | OUTPATIENT
Start: 2022-05-30

## 2022-05-30 RX ORDER — CYCLOBENZAPRINE HCL 10 MG
10 TABLET ORAL DAILY PRN
Qty: 30 TABLET | Refills: 0 | Status: SHIPPED | OUTPATIENT
Start: 2022-05-30 | End: 2022-06-09

## 2022-05-30 RX ORDER — VALACYCLOVIR HYDROCHLORIDE 1 G/1
2000 TABLET, FILM COATED ORAL 2 TIMES DAILY
Qty: 30 TABLET | Refills: 1 | Status: SHIPPED | OUTPATIENT
Start: 2022-05-30 | End: 2023-06-01 | Stop reason: SDUPTHER

## 2022-05-30 RX ORDER — PHENAZOPYRIDINE HYDROCHLORIDE 100 MG/1
100 TABLET, FILM COATED ORAL 3 TIMES DAILY PRN
Qty: 14 TABLET | Refills: 1 | Status: SHIPPED | OUTPATIENT
Start: 2022-05-30 | End: 2022-06-09

## 2022-05-30 RX ORDER — TRETINOIN 1 MG/G
CREAM TOPICAL NIGHTLY
Qty: 45 G | Refills: 1 | Status: SHIPPED | OUTPATIENT
Start: 2022-05-30 | End: 2023-06-09

## 2022-05-30 NOTE — PROGRESS NOTES
Subjective:       Patient ID: Opal Sahu is a 71 y.o. female.    Chief Complaint: review lab results and Muscle Pain (Left buttocks)    Here for f/u lipids and hypothyroid. Doing well overall and in her normal state of health.   Left sciatic pain after lifting ice chest at school trip.       Hyperlipidemia  This is a chronic problem. The current episode started more than 1 year ago. The problem is controlled. Pertinent negatives include no chest pain or shortness of breath.     Review of Systems   Constitutional: Negative for chills and fever.   Respiratory: Negative for cough, chest tightness and shortness of breath.    Cardiovascular: Negative for chest pain, palpitations and leg swelling.   Endocrine: Negative for cold intolerance and heat intolerance.   Musculoskeletal: Positive for back pain.   Psychiatric/Behavioral: Negative for decreased concentration. The patient is not nervous/anxious.        Objective:      Physical Exam  Vitals and nursing note reviewed.   Constitutional:       Appearance: She is well-developed.   HENT:      Head: Normocephalic and atraumatic.   Cardiovascular:      Rate and Rhythm: Normal rate and regular rhythm.      Heart sounds: Normal heart sounds.   Pulmonary:      Effort: Pulmonary effort is normal.      Breath sounds: Normal breath sounds.   Psychiatric:         Mood and Affect: Mood normal.         Behavior: Behavior normal.         Assessment:       1. Hypercholesteremia    2. Hypothyroidism, unspecified type    3. Sciatica of left side        Plan:       Hypercholesteremia  -     Comprehensive Metabolic Panel; Future; Expected date: 05/30/2022  -     CBC Without Differential; Future; Expected date: 05/30/2022  -     Lipid Panel; Future; Expected date: 05/30/2022  -     TSH; Future; Expected date: 05/30/2022    Hypothyroidism, unspecified type  -     Comprehensive Metabolic Panel; Future; Expected date: 05/30/2022  -     CBC Without Differential; Future; Expected date:  05/30/2022  -     Lipid Panel; Future; Expected date: 05/30/2022  -     TSH; Future; Expected date: 05/30/2022    Sciatica of left side    Other orders  -     valACYclovir (VALTREX) 1000 MG tablet; Take 2 tablets (2,000 mg total) by mouth 2 (two) times daily. for 1 day  Dispense: 30 tablet; Refill: 1  -     mupirocin calcium 2% (BACTROBAN) 2 % cream; Apply topically 2 (two) times daily.  Dispense: 30 g; Refill: 5  -     tretinoin (RETIN-A) 0.1 % cream; Apply topically every evening.  Dispense: 45 g; Refill: 1  -     cyclobenzaprine (FLEXERIL) 10 MG tablet; Take 1 tablet (10 mg total) by mouth daily as needed for Muscle spasms.  Dispense: 30 tablet; Refill: 0  -     phenazopyridine (PYRIDIUM) 100 MG tablet; Take 1 tablet (100 mg total) by mouth 3 (three) times daily as needed for Pain.  Dispense: 14 tablet; Refill: 1        Reviewed labs and at goal  Will monitor chronic medical issues and continue current plan of care.  Return precautions given for back    Follow up in about 6 months (around 11/30/2022), or if symptoms worsen or fail to improve.

## 2022-05-31 NOTE — TELEPHONE ENCOUNTER
----- Message from Corinne Kristen sent at 5/31/2022  2:05 PM CDT -----  Contact: Lupe FERNÁNDEZ [facility]  Type:  Pharmacy Calling to Clarify an RX    Name of Caller:  Lupe  Pharmacy Name:  [facility]  Prescription Name:  mupirocin calcium 2% (BACTROBAN) 2 % cream  What do they need to clarify?:  Needs a new RX sent stating ointment  NOT Cream to be filled for pt.  Best Call Back Number:  [phone]  Additional Information:  Please have new RX sent over to [facility], for ointment please. Thank You.

## 2022-06-03 RX ORDER — MUPIROCIN 20 MG/G
OINTMENT TOPICAL 3 TIMES DAILY
Qty: 30 G | Refills: 5 | Status: SHIPPED | OUTPATIENT
Start: 2022-06-03 | End: 2023-06-01

## 2022-06-27 ENCOUNTER — OFFICE VISIT (OUTPATIENT)
Dept: PHYSICAL MEDICINE AND REHAB | Facility: CLINIC | Age: 72
End: 2022-06-27
Payer: COMMERCIAL

## 2022-06-27 VITALS — RESPIRATION RATE: 16 BRPM | WEIGHT: 143.06 LBS | BODY MASS INDEX: 22.99 KG/M2 | HEIGHT: 66 IN

## 2022-06-27 DIAGNOSIS — M17.0 PRIMARY OSTEOARTHRITIS OF BOTH KNEES: Primary | ICD-10-CM

## 2022-06-27 PROCEDURE — 1160F PR REVIEW ALL MEDS BY PRESCRIBER/CLIN PHARMACIST DOCUMENTED: ICD-10-PCS | Mod: CPTII,S$GLB,, | Performed by: PHYSICAL MEDICINE & REHABILITATION

## 2022-06-27 PROCEDURE — 3008F BODY MASS INDEX DOCD: CPT | Mod: CPTII,S$GLB,, | Performed by: PHYSICAL MEDICINE & REHABILITATION

## 2022-06-27 PROCEDURE — 1160F RVW MEDS BY RX/DR IN RCRD: CPT | Mod: CPTII,S$GLB,, | Performed by: PHYSICAL MEDICINE & REHABILITATION

## 2022-06-27 PROCEDURE — 99212 PR OFFICE/OUTPT VISIT, EST, LEVL II, 10-19 MIN: ICD-10-PCS | Mod: S$GLB,,, | Performed by: PHYSICAL MEDICINE & REHABILITATION

## 2022-06-27 PROCEDURE — 99999 PR PBB SHADOW E&M-EST. PATIENT-LVL III: ICD-10-PCS | Mod: PBBFAC,,, | Performed by: PHYSICAL MEDICINE & REHABILITATION

## 2022-06-27 PROCEDURE — 3008F PR BODY MASS INDEX (BMI) DOCUMENTED: ICD-10-PCS | Mod: CPTII,S$GLB,, | Performed by: PHYSICAL MEDICINE & REHABILITATION

## 2022-06-27 PROCEDURE — 1125F AMNT PAIN NOTED PAIN PRSNT: CPT | Mod: CPTII,S$GLB,, | Performed by: PHYSICAL MEDICINE & REHABILITATION

## 2022-06-27 PROCEDURE — 1101F PR PT FALLS ASSESS DOC 0-1 FALLS W/OUT INJ PAST YR: ICD-10-PCS | Mod: CPTII,S$GLB,, | Performed by: PHYSICAL MEDICINE & REHABILITATION

## 2022-06-27 PROCEDURE — 99999 PR PBB SHADOW E&M-EST. PATIENT-LVL III: CPT | Mod: PBBFAC,,, | Performed by: PHYSICAL MEDICINE & REHABILITATION

## 2022-06-27 PROCEDURE — 1125F PR PAIN SEVERITY QUANTIFIED, PAIN PRESENT: ICD-10-PCS | Mod: CPTII,S$GLB,, | Performed by: PHYSICAL MEDICINE & REHABILITATION

## 2022-06-27 PROCEDURE — 1101F PT FALLS ASSESS-DOCD LE1/YR: CPT | Mod: CPTII,S$GLB,, | Performed by: PHYSICAL MEDICINE & REHABILITATION

## 2022-06-27 PROCEDURE — 99212 OFFICE O/P EST SF 10 MIN: CPT | Mod: S$GLB,,, | Performed by: PHYSICAL MEDICINE & REHABILITATION

## 2022-06-27 PROCEDURE — 3288F FALL RISK ASSESSMENT DOCD: CPT | Mod: CPTII,S$GLB,, | Performed by: PHYSICAL MEDICINE & REHABILITATION

## 2022-06-27 PROCEDURE — 1159F PR MEDICATION LIST DOCUMENTED IN MEDICAL RECORD: ICD-10-PCS | Mod: CPTII,S$GLB,, | Performed by: PHYSICAL MEDICINE & REHABILITATION

## 2022-06-27 PROCEDURE — 1159F MED LIST DOCD IN RCRD: CPT | Mod: CPTII,S$GLB,, | Performed by: PHYSICAL MEDICINE & REHABILITATION

## 2022-06-27 PROCEDURE — 3288F PR FALLS RISK ASSESSMENT DOCUMENTED: ICD-10-PCS | Mod: CPTII,S$GLB,, | Performed by: PHYSICAL MEDICINE & REHABILITATION

## 2022-06-27 NOTE — PROGRESS NOTES
[facility]    CHIEF COMPLAINT:   Chief Complaint   Patient presents with    Knee Pain     Bilateral knee pain     HISTORY OF PRESENT ILLNESS: Opal Sahu is a 71 y.o. female who presents to me for follow-up of her bilateral knee pain.  I last saw her about 4 months ago.  At that time we performed bilateral knee injections of hyaluronic acid.  She reports some mild improvement after receiving those injections.  She does experience some periodic knee discomfort at times, more so on the left lateral knee.  She denies any significant swelling of either knee or locking symptoms.  She reports today she is doing well and does not have any pain today.  She does engage in some routine a yoga for exercise.    Review of Systems   Constitutional: Negative for fever.   HENT: Negative for drooling.    Eyes: Negative for discharge.   Respiratory: Negative for choking.    Cardiovascular: Negative for chest pain.   Genitourinary: Negative for flank pain.   Skin: Negative for wound.   Allergic/Immunologic: Negative for immunocompromised state.   Neurological: Negative for tremors and syncope.   Psychiatric/Behavioral: Negative for behavioral problems.     Past Medical History:   Past Medical History:   Diagnosis Date    Hyperlipidemia     Hypothyroid        Past Surgical History:   Past Surgical History:   Procedure Laterality Date    None         Family History:   Family History   Problem Relation Age of Onset    Heart disease Mother     Cancer Father     Heart disease Brother     Breast cancer Other        Medications:   Current Outpatient Medications on File Prior to Visit   Medication Sig Dispense Refill    aspirin 81 mg Tab Every day      atorvastatin (LIPITOR) 10 MG tablet TAKE ONE TABLET BY MOUTH ONCE DAILY 90 tablet 2    azelastine (ASTELIN) 137 mcg (0.1 %) nasal spray 1 spray (137 mcg total) by Nasal route 2 (two) times daily. 30 mL 0    bimatoprost (LATISSE) 0.03 % ophthalmic solution  "Place one drop on applicator and apply evenly along the skin of the upper eyelid at base of eyelashes once daily at bedtime; repeat procedure for second eye (use a clean applicator). 5 mL 1    celecoxib (CELEBREX) 100 MG capsule Take 1 capsule (100 mg total) by mouth daily as needed for Pain. 30 capsule 2    mupirocin (BACTROBAN) 2 % ointment Apply topically 2 (two) times daily. 22 g 1    mupirocin (BACTROBAN) 2 % ointment Apply topically 3 (three) times daily. 30 g 5    mupirocin calcium 2% (BACTROBAN) 2 % cream Apply topically 2 (two) times daily. 30 g 5    SYNTHROID 50 mcg tablet Take 1 tablet (50 mcg total) by mouth once daily. 90 tablet 3    tretinoin (RETIN-A) 0.1 % cream Apply topically every evening. 45 g 1    valACYclovir (VALTREX) 1000 MG tablet Take 2 tablets (2,000 mg total) by mouth 2 (two) times daily. for 1 day 30 tablet 1     No current facility-administered medications on file prior to visit.       Allergies:   Review of patient's allergies indicates:   Allergen Reactions    Povidone-iodine      Other reaction(s): Unknown    Sulfa (sulfonamide antibiotics)      Pharmacy notified us     Social History:   Social History     Socioeconomic History    Marital status:    Occupational History     Employer: [employer]   Tobacco Use    Smoking status: Never Smoker    Smokeless tobacco: Never Used   Substance and Sexual Activity    Alcohol use: Yes    Drug use: No     PHYSICAL EXAMINATION:   General    Vitals:    06/27/22 1042   Resp: 16   Weight: 64.9 kg (143 lb 1.3 oz)   Height: 5' 6" (1.676 m)     Constitutional: Oriented to person, place, and time. No apparent distress. Pleasant.  HENT:   Head: Normocephalic and atraumatic.   Eyes: Right eye exhibits no discharge. Left eye exhibits no discharge. No scleral icterus.   Pulmonary/Chest: Effort normal. No respiratory distress.   Abdominal: There is no guarding.   Neurological: Alert and oriented to person, place, and time. "   Psychiatric: Behavior is normal.   Right Knee Exam     Tenderness   The patient is experiencing no tenderness.     Range of Motion   Extension: 0   Flexion: 140     Tests   Varus: negative Valgus: negative  Patellar apprehension: negative    Other   Erythema: absent  Scars: absent  Sensation: normal  Pulse: present  Swelling: none  Effusion: no effusion present      Left Knee Exam     Tenderness   The patient is experiencing no tenderness.     Range of Motion   Extension: 0   Flexion: 140     Tests   Varus: negative Valgus: negative  Patellar apprehension: negative    Other   Erythema: absent  Scars: absent  Sensation: normal  Pulse: present  Swelling: none  Effusion: no effusion present        INSPECTION: There is no swelling, ecchymoses, erythema or gross deformity of the knees.  GAIT/DYNAMIC: symmetric gait without compensaton    Imaging  X-ray of the knees from 2017: Osteoarthritic changes in both knees with progression particularly in the right medial compartment relative to 09/16/2014.    MRI of the right knee from 2014:  1.  Complex, primarily oblique undersurface degenerative tear of the posterior horn and body of the medial meniscus with involvement of the posterior root attachment of the medial meniscus resulting in medial subluxation of the body of the medial   meniscus.     2.  Reactive edema versus kissing bone contusions at the medial femoral condyle and medial tibial plateau     3.  High-grade multifocal articular cartilage loss and fissuring with possible developing osteochondral lesion formation at the patellar eminence and inferomedial trochlear notch     4.  Ruptured Baker's cyst antegrade I-II injury of the medial head of the gastrocnemius muscle    ASSESSMENT:   1. Primary osteoarthritis of both knees      PLAN:     1. Ms. Sahu appears to be doing fairly well in regards to her bilateral knee osteoarthritis.  She is staying active and I encouraged her to continue to do so with emphasis on  low-impact activities such as cycling, elliptical machine and yoga.  We discussed our immediate course of action will be continued to consider injection therapies.  We did discuss biologic injections which she has had the past without any significant improvement.  She feels that her pain is not high enough today to warrant injection of corticosteroid.  She has had the injections of hyaluronic acid early this year with mild improvement.  We discussed these injections to improve intra-articular health and slow progression of further osteoarthritic change.  As such, I do believe it is appropriate to repeat these after a full 6 months from her previous around.    2. RTC in 2 months for repeat injections of hyaluronic acid to both knees.    The above note was completed, in part, with the aid of Dragon dictation software/hardware. Translation errors may be present.

## 2022-07-13 DIAGNOSIS — M17.0 PRIMARY OSTEOARTHRITIS OF BOTH KNEES: Primary | ICD-10-CM

## 2022-08-05 ENCOUNTER — PATIENT MESSAGE (OUTPATIENT)
Dept: PHYSICAL MEDICINE AND REHAB | Facility: CLINIC | Age: 72
End: 2022-08-05
Payer: COMMERCIAL

## 2022-08-05 ENCOUNTER — TELEPHONE (OUTPATIENT)
Dept: FAMILY MEDICINE | Facility: CLINIC | Age: 72
End: 2022-08-05
Payer: COMMERCIAL

## 2022-08-05 DIAGNOSIS — Z12.31 ENCOUNTER FOR SCREENING MAMMOGRAM FOR MALIGNANT NEOPLASM OF BREAST: Primary | ICD-10-CM

## 2022-08-05 NOTE — TELEPHONE ENCOUNTER
----- Message from Rachel Nielsen sent at 8/5/2022  4:29 PM CDT -----  Contact: self  Type: Sooner Appointment Request        Caller is requesting a sooner appointment. Caller declined first available appointment listed below. Caller will not accept being placed on the waitlist and is requesting a message be sent to doctor.        Name of Caller: Patient  When is the first available appointment? N/a  Best Call Back Number: [phone] (home)   Additional Information: Pt just needs to get scheduled for her mammo screening. Call pt back to get scheduled. Thanks

## 2022-08-29 ENCOUNTER — HOSPITAL ENCOUNTER (OUTPATIENT)
Dept: RADIOLOGY | Facility: HOSPITAL | Age: 72
Discharge: HOME | End: 2022-08-29
Attending: FAMILY MEDICINE
Payer: COMMERCIAL

## 2022-08-29 DIAGNOSIS — Z12.31 ENCOUNTER FOR SCREENING MAMMOGRAM FOR MALIGNANT NEOPLASM OF BREAST: ICD-10-CM

## 2022-08-29 PROCEDURE — 77067 MAMMO DIGITAL SCREENING BILAT WITH TOMO: ICD-10-PCS | Mod: 26,,, | Performed by: RADIOLOGY

## 2022-08-29 PROCEDURE — 77063 MAMMO DIGITAL SCREENING BILAT WITH TOMO: ICD-10-PCS | Mod: 26,,, | Performed by: RADIOLOGY

## 2022-08-29 PROCEDURE — 77067 SCR MAMMO BI INCL CAD: CPT | Mod: 26,,, | Performed by: RADIOLOGY

## 2022-08-29 PROCEDURE — 77063 BREAST TOMOSYNTHESIS BI: CPT | Mod: 26,,, | Performed by: RADIOLOGY

## 2022-08-29 PROCEDURE — 77063 BREAST TOMOSYNTHESIS BI: CPT | Mod: TC,PO

## 2022-08-31 ENCOUNTER — OFFICE VISIT (OUTPATIENT)
Dept: PHYSICAL MEDICINE AND REHAB | Facility: CLINIC | Age: 72
End: 2022-08-31
Payer: COMMERCIAL

## 2022-08-31 ENCOUNTER — TELEPHONE (OUTPATIENT)
Dept: PHYSICAL MEDICINE AND REHAB | Facility: CLINIC | Age: 72
End: 2022-08-31
Payer: COMMERCIAL

## 2022-08-31 VITALS — BODY MASS INDEX: 22.99 KG/M2 | WEIGHT: 143.06 LBS | HEIGHT: 66 IN

## 2022-08-31 DIAGNOSIS — M17.0 PRIMARY OSTEOARTHRITIS OF BOTH KNEES: Primary | ICD-10-CM

## 2022-08-31 DIAGNOSIS — M67.952 TENDINOPATHY OF LEFT GLUTEUS MEDIUS: ICD-10-CM

## 2022-08-31 PROCEDURE — 1159F PR MEDICATION LIST DOCUMENTED IN MEDICAL RECORD: ICD-10-PCS | Mod: CPTII,S$GLB,, | Performed by: PHYSICAL MEDICINE & REHABILITATION

## 2022-08-31 PROCEDURE — 99213 OFFICE O/P EST LOW 20 MIN: CPT | Mod: 25,S$GLB,, | Performed by: PHYSICAL MEDICINE & REHABILITATION

## 2022-08-31 PROCEDURE — 1101F PT FALLS ASSESS-DOCD LE1/YR: CPT | Mod: CPTII,S$GLB,, | Performed by: PHYSICAL MEDICINE & REHABILITATION

## 2022-08-31 PROCEDURE — 99999 PR PBB SHADOW E&M-EST. PATIENT-LVL III: CPT | Mod: PBBFAC,,, | Performed by: PHYSICAL MEDICINE & REHABILITATION

## 2022-08-31 PROCEDURE — 99999 PR PBB SHADOW E&M-EST. PATIENT-LVL III: ICD-10-PCS | Mod: PBBFAC,,, | Performed by: PHYSICAL MEDICINE & REHABILITATION

## 2022-08-31 PROCEDURE — 20611 LARGE JOINT ASPIRATION/INJECTION: L GREATER TROCHANTERIC BURSA: ICD-10-PCS | Mod: 59,LT,S$GLB, | Performed by: PHYSICAL MEDICINE & REHABILITATION

## 2022-08-31 PROCEDURE — 3008F PR BODY MASS INDEX (BMI) DOCUMENTED: ICD-10-PCS | Mod: CPTII,S$GLB,, | Performed by: PHYSICAL MEDICINE & REHABILITATION

## 2022-08-31 PROCEDURE — 1126F AMNT PAIN NOTED NONE PRSNT: CPT | Mod: CPTII,S$GLB,, | Performed by: PHYSICAL MEDICINE & REHABILITATION

## 2022-08-31 PROCEDURE — 20611 DRAIN/INJ JOINT/BURSA W/US: CPT | Mod: 59,LT,S$GLB, | Performed by: PHYSICAL MEDICINE & REHABILITATION

## 2022-08-31 PROCEDURE — 1159F MED LIST DOCD IN RCRD: CPT | Mod: CPTII,S$GLB,, | Performed by: PHYSICAL MEDICINE & REHABILITATION

## 2022-08-31 PROCEDURE — 3288F PR FALLS RISK ASSESSMENT DOCUMENTED: ICD-10-PCS | Mod: CPTII,S$GLB,, | Performed by: PHYSICAL MEDICINE & REHABILITATION

## 2022-08-31 PROCEDURE — 99213 PR OFFICE/OUTPT VISIT, EST, LEVL III, 20-29 MIN: ICD-10-PCS | Mod: 25,S$GLB,, | Performed by: PHYSICAL MEDICINE & REHABILITATION

## 2022-08-31 PROCEDURE — 3008F BODY MASS INDEX DOCD: CPT | Mod: CPTII,S$GLB,, | Performed by: PHYSICAL MEDICINE & REHABILITATION

## 2022-08-31 PROCEDURE — 1160F PR REVIEW ALL MEDS BY PRESCRIBER/CLIN PHARMACIST DOCUMENTED: ICD-10-PCS | Mod: CPTII,S$GLB,, | Performed by: PHYSICAL MEDICINE & REHABILITATION

## 2022-08-31 PROCEDURE — 20611 DRAIN/INJ JOINT/BURSA W/US: CPT | Mod: 50,S$GLB,, | Performed by: PHYSICAL MEDICINE & REHABILITATION

## 2022-08-31 PROCEDURE — 1160F RVW MEDS BY RX/DR IN RCRD: CPT | Mod: CPTII,S$GLB,, | Performed by: PHYSICAL MEDICINE & REHABILITATION

## 2022-08-31 PROCEDURE — 3288F FALL RISK ASSESSMENT DOCD: CPT | Mod: CPTII,S$GLB,, | Performed by: PHYSICAL MEDICINE & REHABILITATION

## 2022-08-31 PROCEDURE — 1101F PR PT FALLS ASSESS DOC 0-1 FALLS W/OUT INJ PAST YR: ICD-10-PCS | Mod: CPTII,S$GLB,, | Performed by: PHYSICAL MEDICINE & REHABILITATION

## 2022-08-31 PROCEDURE — 1126F PR PAIN SEVERITY QUANTIFIED, NO PAIN PRESENT: ICD-10-PCS | Mod: CPTII,S$GLB,, | Performed by: PHYSICAL MEDICINE & REHABILITATION

## 2022-08-31 RX ORDER — BETAMETHASONE SODIUM PHOSPHATE AND BETAMETHASONE ACETATE 3; 3 MG/ML; MG/ML
6 INJECTION, SUSPENSION INTRA-ARTICULAR; INTRALESIONAL; INTRAMUSCULAR; SOFT TISSUE
Status: DISCONTINUED | OUTPATIENT
Start: 2022-08-31 | End: 2022-08-31 | Stop reason: HOSPADM

## 2022-08-31 RX ADMIN — BETAMETHASONE SODIUM PHOSPHATE AND BETAMETHASONE ACETATE 6 MG: 3; 3 INJECTION, SUSPENSION INTRA-ARTICULAR; INTRALESIONAL; INTRAMUSCULAR; SOFT TISSUE at 03:08

## 2022-08-31 NOTE — PROGRESS NOTES
[facility]    CHIEF COMPLAINT:   Chief Complaint   Patient presents with    Other Misc     JUSTINA synvisc one     HISTORY OF PRESENT ILLNESS: Opal Sahu is a 71 y.o. female who presents to me for follow-up of her bilateral knee and left hip pain.  I last saw her in June of this year.  We have discussed performing injection of hyaluronic acid.  She returns today for bilateral knee injections today.  She reports persisting knee pain over the past several weeks.  She denies any new trauma or injury.  She also is reporting recurrence of left lateral hip pain.  This hip pain is intermittent but she does seems to have consistent pain with lying on her left side in bed.     Review of Systems   Constitutional: Negative for fever.   HENT: Negative for drooling.    Eyes: Negative for discharge.   Respiratory: Negative for choking.    Cardiovascular: Negative for chest pain.   Genitourinary: Negative for flank pain.   Skin: Negative for wound.   Allergic/Immunologic: Negative for immunocompromised state.   Neurological: Negative for tremors and syncope.   Psychiatric/Behavioral: Negative for behavioral problems.     Past Medical History:   Past Medical History:   Diagnosis Date    Hyperlipidemia     Hypothyroid        Past Surgical History:   Past Surgical History:   Procedure Laterality Date    None         Family History:   Family History   Problem Relation Age of Onset    Heart disease Mother     Cancer Father     Heart disease Brother     Breast cancer Other        Medications:   Current Outpatient Medications on File Prior to Visit   Medication Sig Dispense Refill    aspirin 81 mg Tab Every day      atorvastatin (LIPITOR) 10 MG tablet TAKE ONE TABLET BY MOUTH ONCE DAILY 90 tablet 2    bimatoprost (LATISSE) 0.03 % ophthalmic solution Place one drop on applicator and apply evenly along the skin of the upper eyelid at base of eyelashes once daily at bedtime; repeat procedure for second eye (use a  "clean applicator). 5 mL 1    celecoxib (CELEBREX) 100 MG capsule Take 1 capsule (100 mg total) by mouth daily as needed for Pain. 30 capsule 2    mupirocin (BACTROBAN) 2 % ointment Apply topically 2 (two) times daily. 22 g 1    mupirocin (BACTROBAN) 2 % ointment Apply topically 3 (three) times daily. 30 g 5    mupirocin calcium 2% (BACTROBAN) 2 % cream Apply topically 2 (two) times daily. 30 g 5    SYNTHROID 50 mcg tablet Take 1 tablet (50 mcg total) by mouth once daily. 90 tablet 3    tretinoin (RETIN-A) 0.1 % cream Apply topically every evening. 45 g 1    azelastine (ASTELIN) 137 mcg (0.1 %) nasal spray 1 spray (137 mcg total) by Nasal route 2 (two) times daily. 30 mL 0    valACYclovir (VALTREX) 1000 MG tablet Take 2 tablets (2,000 mg total) by mouth 2 (two) times daily. for 1 day 30 tablet 1     No current facility-administered medications on file prior to visit.       Allergies:   Review of patient's allergies indicates:   Allergen Reactions    Povidone-iodine Hives     Other reaction(s): Unknown    Sulfa (sulfonamide antibiotics)      Pharmacy notified us     Social History:   Social History     Socioeconomic History    Marital status:    Occupational History     Employer: [employer]   Tobacco Use    Smoking status: Never    Smokeless tobacco: Never   Substance and Sexual Activity    Alcohol use: Yes    Drug use: No     PHYSICAL EXAMINATION:   General    Vitals:    08/31/22 1530   Weight: 64.9 kg (143 lb 1.3 oz)   Height: 5' 6" (1.676 m)     Constitutional: Oriented to person, place, and time. No apparent distress. Pleasant.  HENT:   Head: Normocephalic and atraumatic.   Eyes: Right eye exhibits no discharge. Left eye exhibits no discharge. No scleral icterus.   Pulmonary/Chest: Effort normal. No respiratory distress.   Abdominal: There is no guarding.   Neurological: Alert and oriented to person, place, and time.   Psychiatric: Behavior is normal.   Right Knee Exam     Tenderness   The " patient is experiencing no tenderness.     Range of Motion   Extension:  0   Flexion:  140     Tests   Varus: negative Valgus: negative  Patellar apprehension: negative    Other   Erythema: absent  Scars: absent  Sensation: normal  Pulse: present  Swelling: none  Effusion: no effusion present      Left Knee Exam     Tenderness   The patient is experiencing no tenderness.     Range of Motion   Extension:  0   Flexion:  140     Tests   Varus: negative Valgus: negative  Patellar apprehension: negative    Other   Erythema: absent  Scars: absent  Sensation: normal  Pulse: present  Swelling: none  Effusion: no effusion present      Left Hip Exam     Tenderness   The patient is experiencing tenderness in the lateral and greater trochanter.    Muscle Strength   Flexion: 5/5     Tests   ALISIA: negative    Other   Erythema: absent  Scars: absent  Sensation: normal  Pulse: present      INSPECTION: There is no swelling, ecchymoses, erythema or gross deformity of the knees.  GAIT/DYNAMIC: Symmetric gait without compensation.    Imaging  X-ray of the knees from 2017: Osteoarthritic changes in both knees with progression particularly in the right medial compartment relative to 09/16/2014.    MRI of the right knee from 2014:  1.  Complex, primarily oblique undersurface degenerative tear of the posterior horn and body of the medial meniscus with involvement of the posterior root attachment of the medial meniscus resulting in medial subluxation of the body of the medial   meniscus.     2.  Reactive edema versus kissing bone contusions at the medial femoral condyle and medial tibial plateau     3.  High-grade multifocal articular cartilage loss and fissuring with possible developing osteochondral lesion formation at the patellar eminence and inferomedial trochlear notch     4.  Ruptured Baker's cyst antegrade I-II injury of the medial head of the gastrocnemius muscle    ASSESSMENT:   1. Primary osteoarthritis of both knees    2.  Tendinopathy of left gluteus medius      PLAN:     1. Ms. Sahu returns today for scheduled injections of hyaluronic acid both knees.  See separate procedure note for ultrasound-guided injection of Synvisc-One to both knees.    2. In terms of her left hip, her symptoms are again consistent with gluteal tendinopathy.  As such, we moved forward with ultrasound-guided injection of corticosteroid to the left gluteal bursae.    3. Return to clinic as needed.    The above note was completed, in part, with the aid of Dragon dictation software/hardware. Translation errors may be present.

## 2022-08-31 NOTE — PROCEDURES
Large Joint Aspiration/Injection: bilateral knee    Date/Time: 8/31/2022 3:00 PM  Performed by: Kieran Shay MD  Authorized by: Kieran Shay MD     Consent Done?:  Yes (Verbal)  Indications:  Arthritis and pain  Site marked: the procedure site was marked    Timeout: prior to procedure the correct patient, procedure, and site was verified    Prep: patient was prepped and draped in usual sterile fashion    Local anesthesia used?: No      Details:  Needle Size:  22 G  Ultrasonic Guidance for needle placement?: Yes    Images are saved and documented.  Approach: in plane, lat to med.  Location:  Knee  Laterality:  Bilateral  Site:  Bilateral knee  Medications (Right):  48 mg hylan g-f 20 48 mg/6 mL  Medications (Left):  48 mg hylan g-f 20 48 mg/6 mL  Patient tolerance:  Patient tolerated the procedure well with no immediate complications     Ultrasound guidance was used for correct needle placement, the images were saved will be uploaded to EMR.

## 2022-08-31 NOTE — PROCEDURES
Large Joint Aspiration/Injection: L greater trochanteric bursa    Date/Time: 8/31/2022 3:00 PM  Performed by: Kieran Shay MD  Authorized by: Kieran Shay MD     Consent Done?:  Yes (Verbal)  Indications:  Pain  Site marked: the procedure site was marked    Timeout: prior to procedure the correct patient, procedure, and site was verified    Prep: patient was prepped and draped in usual sterile fashion    Local anesthesia used?: No      Details:  Needle Size:  22 G  Ultrasonic Guidance for needle placement?: Yes    Images are saved and documented.  Approach: in plane, lat to med.  Location:  Hip  Site:  L greater trochanteric bursa  Medications:  6 mg betamethasone acetate-betamethasone sodium phosphate 6 mg/mL  Patient tolerance:  Patient tolerated the procedure well with no immediate complications     Ultrasound guidance was used for correct needle placement, the images were saved will be uploaded to EMR.

## 2022-08-31 NOTE — TELEPHONE ENCOUNTER
----- Message from Treasure Tapia sent at 8/31/2022  3:04 PM CDT -----  .Type:  Patient Call Back    Who Called: PT       Does the patient know what this is regarding?: PT RUNNING LATE     Would the patient rather a call back YES     Best Call Back Number:  [phone]    Additional Information: Thank You

## 2022-08-31 NOTE — TELEPHONE ENCOUNTER
Pt stated that she is running late and is in traffic. I informed pt that her appt was for 3. Pt thought her appt was for 3:20. I informed pt that I would double check with Dr. Shay to see if we can still see her today. Pt understood and is still on the way.

## 2022-09-20 DIAGNOSIS — E03.4 HYPOTHYROIDISM DUE TO ACQUIRED ATROPHY OF THYROID: Chronic | ICD-10-CM

## 2022-09-20 RX ORDER — LEVOTHYROXINE SODIUM 50 UG/1
TABLET ORAL
Qty: 90 TABLET | Refills: 2 | Status: SHIPPED | OUTPATIENT
Start: 2022-09-20 | End: 2023-07-02 | Stop reason: SDUPTHER

## 2022-09-20 NOTE — TELEPHONE ENCOUNTER
No new care gaps identified.  [facility] Embedded Care Gaps. Reference number: [id number]. 9/20/2022   11:30:49 AM CDT

## 2022-09-20 NOTE — TELEPHONE ENCOUNTER
Refill Decision Note   Opal Adelina  is requesting a refill authorization.  Brief Assessment and Rationale for Refill:  Approve     Medication Therapy Plan:       Medication Reconciliation Completed: No   Comments:     No Care Gaps recommended.     Note composed:12:41 PM 09/20/2022

## 2022-11-18 ENCOUNTER — LAB VISIT (OUTPATIENT)
Dept: LAB | Facility: HOSPITAL | Age: 72
End: 2022-11-18
Attending: FAMILY MEDICINE
Payer: COMMERCIAL

## 2022-11-18 DIAGNOSIS — E03.9 HYPOTHYROIDISM, UNSPECIFIED TYPE: Chronic | ICD-10-CM

## 2022-11-18 DIAGNOSIS — E78.00 HYPERCHOLESTEREMIA: Chronic | ICD-10-CM

## 2022-11-18 LAB
ALBUMIN SERPL BCP-MCNC: 4 G/DL (ref 3.5–5.2)
ALP SERPL-CCNC: 66 U/L (ref 55–135)
ALT SERPL W/O P-5'-P-CCNC: 17 U/L (ref 10–44)
ANION GAP SERPL CALC-SCNC: 9 MMOL/L (ref 8–16)
AST SERPL-CCNC: 13 U/L (ref 10–40)
BILIRUB SERPL-MCNC: 1.5 MG/DL (ref 0.1–1)
BUN SERPL-MCNC: 16 MG/DL (ref 8–23)
CALCIUM SERPL-MCNC: 9.8 MG/DL (ref 8.7–10.5)
CHLORIDE SERPL-SCNC: 105 MMOL/L (ref 95–110)
CHOLEST SERPL-MCNC: 216 MG/DL (ref 120–199)
CHOLEST/HDLC SERPL: 3.7 {RATIO} (ref 2–5)
CO2 SERPL-SCNC: 27 MMOL/L (ref 23–29)
CREAT SERPL-MCNC: 0.7 MG/DL (ref 0.5–1.4)
ERYTHROCYTE [DISTWIDTH] IN BLOOD BY AUTOMATED COUNT: 12.9 % (ref 11.5–14.5)
EST. GFR  (NO RACE VARIABLE): >60 ML/MIN/1.73 M^2
GLUCOSE SERPL-MCNC: 107 MG/DL (ref 70–110)
HCT VFR BLD AUTO: 41.5 % (ref 37–48.5)
HDLC SERPL-MCNC: 58 MG/DL (ref 40–75)
HDLC SERPL: 26.9 % (ref 20–50)
HGB BLD-MCNC: 13.5 G/DL (ref 12–16)
LDLC SERPL CALC-MCNC: 133.8 MG/DL (ref 63–159)
MCH RBC QN AUTO: 29.7 PG (ref 27–31)
MCHC RBC AUTO-ENTMCNC: 32.5 G/DL (ref 32–36)
MCV RBC AUTO: 91 FL (ref 82–98)
NONHDLC SERPL-MCNC: 158 MG/DL
PLATELET # BLD AUTO: 428 K/UL (ref 150–450)
PMV BLD AUTO: 9.3 FL (ref 9.2–12.9)
POTASSIUM SERPL-SCNC: 4.7 MMOL/L (ref 3.5–5.1)
PROT SERPL-MCNC: 7 G/DL (ref 6–8.4)
RBC # BLD AUTO: 4.54 M/UL (ref 4–5.4)
SODIUM SERPL-SCNC: 141 MMOL/L (ref 136–145)
TRIGL SERPL-MCNC: 121 MG/DL (ref 30–150)
TSH SERPL DL<=0.005 MIU/L-ACNC: 0.74 UIU/ML (ref 0.4–4)
WBC # BLD AUTO: 6.98 K/UL (ref 3.9–12.7)

## 2022-11-18 PROCEDURE — 36415 COLL VENOUS BLD VENIPUNCTURE: CPT | Mod: PN | Performed by: FAMILY MEDICINE

## 2022-11-18 PROCEDURE — 80061 LIPID PANEL: CPT | Performed by: FAMILY MEDICINE

## 2022-11-18 PROCEDURE — 80053 COMPREHEN METABOLIC PANEL: CPT | Performed by: FAMILY MEDICINE

## 2022-11-18 PROCEDURE — 84443 ASSAY THYROID STIM HORMONE: CPT | Performed by: FAMILY MEDICINE

## 2022-11-18 PROCEDURE — 85027 COMPLETE CBC AUTOMATED: CPT | Performed by: FAMILY MEDICINE

## 2022-12-01 ENCOUNTER — OFFICE VISIT (OUTPATIENT)
Dept: FAMILY MEDICINE | Facility: CLINIC | Age: 72
End: 2022-12-01
Payer: COMMERCIAL

## 2022-12-01 VITALS
HEIGHT: 66 IN | BODY MASS INDEX: 23.09 KG/M2 | OXYGEN SATURATION: 98 % | DIASTOLIC BLOOD PRESSURE: 88 MMHG | HEART RATE: 95 BPM | SYSTOLIC BLOOD PRESSURE: 128 MMHG | TEMPERATURE: 98 F

## 2022-12-01 DIAGNOSIS — Z00.00 WELLNESS EXAMINATION: Primary | ICD-10-CM

## 2022-12-01 DIAGNOSIS — E03.9 HYPOTHYROIDISM, UNSPECIFIED TYPE: Chronic | ICD-10-CM

## 2022-12-01 DIAGNOSIS — E04.1 THYROID NODULE: ICD-10-CM

## 2022-12-01 DIAGNOSIS — E78.00 HYPERCHOLESTEREMIA: Chronic | ICD-10-CM

## 2022-12-01 PROCEDURE — 99999 PR PBB SHADOW E&M-EST. PATIENT-LVL IV: CPT | Mod: PBBFAC,,, | Performed by: FAMILY MEDICINE

## 2022-12-01 PROCEDURE — 3074F SYST BP LT 130 MM HG: CPT | Mod: CPTII,S$GLB,, | Performed by: FAMILY MEDICINE

## 2022-12-01 PROCEDURE — 90732 PNEUMOCOCCAL POLYSACCHARIDE VACCINE 23-VALENT =>2YO SQ IM: ICD-10-PCS | Mod: S$GLB,,, | Performed by: FAMILY MEDICINE

## 2022-12-01 PROCEDURE — 1159F PR MEDICATION LIST DOCUMENTED IN MEDICAL RECORD: ICD-10-PCS | Mod: CPTII,S$GLB,, | Performed by: FAMILY MEDICINE

## 2022-12-01 PROCEDURE — 3008F PR BODY MASS INDEX (BMI) DOCUMENTED: ICD-10-PCS | Mod: CPTII,S$GLB,, | Performed by: FAMILY MEDICINE

## 2022-12-01 PROCEDURE — 1126F PR PAIN SEVERITY QUANTIFIED, NO PAIN PRESENT: ICD-10-PCS | Mod: CPTII,S$GLB,, | Performed by: FAMILY MEDICINE

## 2022-12-01 PROCEDURE — 3079F PR MOST RECENT DIASTOLIC BLOOD PRESSURE 80-89 MM HG: ICD-10-PCS | Mod: CPTII,S$GLB,, | Performed by: FAMILY MEDICINE

## 2022-12-01 PROCEDURE — 99397 PER PM REEVAL EST PAT 65+ YR: CPT | Mod: 25,S$GLB,, | Performed by: FAMILY MEDICINE

## 2022-12-01 PROCEDURE — 90471 PNEUMOCOCCAL POLYSACCHARIDE VACCINE 23-VALENT =>2YO SQ IM: ICD-10-PCS | Mod: S$GLB,,, | Performed by: FAMILY MEDICINE

## 2022-12-01 PROCEDURE — 90471 IMMUNIZATION ADMIN: CPT | Mod: S$GLB,,, | Performed by: FAMILY MEDICINE

## 2022-12-01 PROCEDURE — 99999 PR PBB SHADOW E&M-EST. PATIENT-LVL IV: ICD-10-PCS | Mod: PBBFAC,,, | Performed by: FAMILY MEDICINE

## 2022-12-01 PROCEDURE — 3008F BODY MASS INDEX DOCD: CPT | Mod: CPTII,S$GLB,, | Performed by: FAMILY MEDICINE

## 2022-12-01 PROCEDURE — 3074F PR MOST RECENT SYSTOLIC BLOOD PRESSURE < 130 MM HG: ICD-10-PCS | Mod: CPTII,S$GLB,, | Performed by: FAMILY MEDICINE

## 2022-12-01 PROCEDURE — 1126F AMNT PAIN NOTED NONE PRSNT: CPT | Mod: CPTII,S$GLB,, | Performed by: FAMILY MEDICINE

## 2022-12-01 PROCEDURE — 1159F MED LIST DOCD IN RCRD: CPT | Mod: CPTII,S$GLB,, | Performed by: FAMILY MEDICINE

## 2022-12-01 PROCEDURE — 99397 PR PREVENTIVE VISIT,EST,65 & OVER: ICD-10-PCS | Mod: 25,S$GLB,, | Performed by: FAMILY MEDICINE

## 2022-12-01 PROCEDURE — 3079F DIAST BP 80-89 MM HG: CPT | Mod: CPTII,S$GLB,, | Performed by: FAMILY MEDICINE

## 2022-12-01 PROCEDURE — 90732 PPSV23 VACC 2 YRS+ SUBQ/IM: CPT | Mod: S$GLB,,, | Performed by: FAMILY MEDICINE

## 2022-12-01 RX ORDER — SEMAGLUTIDE 1.34 MG/ML
0.25 INJECTION, SOLUTION SUBCUTANEOUS
Qty: 4 PEN | Refills: 2 | Status: SHIPPED | OUTPATIENT
Start: 2022-12-01 | End: 2023-06-01 | Stop reason: SDUPTHER

## 2022-12-01 NOTE — PROGRESS NOTES
Subjective:       Patient ID: Opal Sahu is a 72 y.o. female.    Chief Complaint: Annual Exam    Here for wellness and f/u chronic issues. Doing well overall.       Review of Systems   Constitutional:  Negative for chills and fever.   Respiratory:  Negative for cough, chest tightness and shortness of breath.    Cardiovascular:  Negative for chest pain, palpitations and leg swelling.   Endocrine: Negative for cold intolerance and heat intolerance.   Psychiatric/Behavioral:  Negative for decreased concentration. The patient is not nervous/anxious.      Objective:      Physical Exam  Vitals and nursing note reviewed.   Constitutional:       Appearance: She is well-developed.   HENT:      Head: Normocephalic and atraumatic.   Cardiovascular:      Rate and Rhythm: Normal rate and regular rhythm.      Heart sounds: Normal heart sounds.   Pulmonary:      Effort: Pulmonary effort is normal.      Breath sounds: Normal breath sounds.   Psychiatric:         Mood and Affect: Mood normal.         Behavior: Behavior normal.       Assessment:       1. Wellness examination    2. Hypercholesteremia    3. Hypothyroidism, unspecified type    4. Thyroid nodule        Plan:       Wellness examination  -     CBC Without Differential; Future; Expected date: 12/01/2022  -     Comprehensive Metabolic Panel; Future; Expected date: 12/01/2022  -     Lipid Panel; Future; Expected date: 12/01/2022  -     TSH; Future; Expected date: 12/01/2022    Hypercholesteremia    Hypothyroidism, unspecified type    Thyroid nodule  -     US Soft Tissue Head Neck Thyroid; Future; Expected date: 12/01/2022    Other orders  -     (In Office Administered) Pneumococcal Polysaccharide Vaccine (23 Valent) (SQ/IM)  -     semaglutide (OZEMPIC) 0.25 mg or 0.5 mg(2 mg/1.5 mL) pen injector; Inject 0.25 mg into the skin every 7 days.  Dispense: 4 pen; Refill: 2    Monitor home bp log  She would like to try ozempic for wt loss  Wt fluctuating after covid; discussed  lifestyle change is needed  Follow up in about 6 months (around 6/1/2023), or if symptoms worsen or fail to improve.

## 2023-02-09 ENCOUNTER — TELEPHONE (OUTPATIENT)
Dept: FAMILY MEDICINE | Facility: CLINIC | Age: 73
End: 2023-02-09

## 2023-02-09 NOTE — TELEPHONE ENCOUNTER
----- Message from Keesha Murhpy sent at 2/9/2023  7:43 AM CST -----  Contact: self [phone]  Pt said can she get a referral to see Dr Colmenares she said it is time for her to get shots in her knees and hip. She said she saw Dr Mcgraw before .

## 2023-03-13 ENCOUNTER — PATIENT MESSAGE (OUTPATIENT)
Dept: FAMILY MEDICINE | Facility: CLINIC | Age: 73
End: 2023-03-13
Payer: COMMERCIAL

## 2023-03-20 ENCOUNTER — TELEPHONE (OUTPATIENT)
Dept: PHYSICAL MEDICINE AND REHAB | Facility: CLINIC | Age: 73
End: 2023-03-20
Payer: COMMERCIAL

## 2023-03-20 NOTE — TELEPHONE ENCOUNTER
----- Message from Joyce Taylor sent at 3/17/2023  4:26 PM CDT -----  Regarding: pt call bk  Name of Who is Calling:EUSEBIA JONES [[id number]]        What is the request in detail: Pt inquiring on knowing if she needs to make a new mateusz for knee injections for her new appointment wt doctor          Can the clinic reply by [facility]: no         What Number to Call Back if not in [facility]: Telephone Information:  [employer]          [phone]

## 2023-03-24 ENCOUNTER — TELEPHONE (OUTPATIENT)
Dept: PHYSICAL MEDICINE AND REHAB | Facility: CLINIC | Age: 73
End: 2023-03-24
Payer: COMMERCIAL

## 2023-03-24 DIAGNOSIS — M17.0 PRIMARY OSTEOARTHRITIS OF BOTH KNEES: Primary | ICD-10-CM

## 2023-03-24 NOTE — TELEPHONE ENCOUNTER
Left voicemail for pt in regards to inform pt that Dr. Colmenares stated we can do the gel injections.

## 2023-03-24 NOTE — TELEPHONE ENCOUNTER
----- Message from Mary Carpio sent at 3/23/2023  4:43 PM CDT -----  Contact: Pt @ [phone]  Type:  Needs Medical Advice    Who Called: Pt  Would the patient rather a call back or a response via [facility]? call  Best Call Back Number: [phone]  Additional Information: Pt would like a call back to discuss her upcoming appt.She has questions about the appt. Please call pt back to advise.

## 2023-03-24 NOTE — TELEPHONE ENCOUNTER
Pt called stating she has gotten the gel injections with Dr. Shay and is wondering if Dr. Colmenares needs to see her before getting the gel injections. I told pt I would inform Dr. Colmenares and let her know what he says.

## 2023-03-28 ENCOUNTER — TELEPHONE (OUTPATIENT)
Dept: PHYSICAL MEDICINE AND REHAB | Facility: CLINIC | Age: 73
End: 2023-03-28
Payer: COMMERCIAL

## 2023-03-28 NOTE — TELEPHONE ENCOUNTER
Informed pt that the gel injections are approved. Pt understood. Pt stated that she would also like an injection for her hip. I informed pt that she can discuss that with Dr. Colmenares at her appointment tomorrow. pt understood.

## 2023-03-28 NOTE — TELEPHONE ENCOUNTER
----- Message from Joya Boyd sent at 3/28/2023  2:15 PM CDT -----  Contact: Patient  Type:  Patient Returning Call    Who Called:  Patient  Who Left Message for Patient:  REJI RENETTA  Does the patient know what this is regarding?:  If pt should keep her injection appt tomorrow  Best Call Back Number:  [phone]  Additional Information:  Please call pt back again to advise. Thanks!

## 2023-03-28 NOTE — TELEPHONE ENCOUNTER
Left voicemail for pt in regards to inform pt that her knee injections were approved for tomorrow.

## 2023-03-29 ENCOUNTER — CLINICAL SUPPORT (OUTPATIENT)
Dept: PHYSICAL MEDICINE AND REHAB | Facility: CLINIC | Age: 73
End: 2023-03-29
Payer: COMMERCIAL

## 2023-03-29 VITALS
HEART RATE: 95 BPM | SYSTOLIC BLOOD PRESSURE: 129 MMHG | DIASTOLIC BLOOD PRESSURE: 81 MMHG | BODY MASS INDEX: 23.09 KG/M2 | HEIGHT: 66 IN

## 2023-03-29 DIAGNOSIS — M17.0 PRIMARY OSTEOARTHRITIS OF BOTH KNEES: Primary | ICD-10-CM

## 2023-03-29 DIAGNOSIS — M22.2X2 PATELLOFEMORAL PAIN SYNDROME OF BOTH KNEES: ICD-10-CM

## 2023-03-29 DIAGNOSIS — M25.562 CHRONIC PAIN OF BOTH KNEES: ICD-10-CM

## 2023-03-29 DIAGNOSIS — M67.952 TENDINOPATHY OF LEFT GLUTEUS MEDIUS: ICD-10-CM

## 2023-03-29 DIAGNOSIS — M25.561 CHRONIC PAIN OF BOTH KNEES: ICD-10-CM

## 2023-03-29 DIAGNOSIS — G89.29 CHRONIC PAIN OF BOTH KNEES: ICD-10-CM

## 2023-03-29 DIAGNOSIS — M22.2X1 PATELLOFEMORAL PAIN SYNDROME OF BOTH KNEES: ICD-10-CM

## 2023-03-29 PROCEDURE — 76942 ECHO GUIDE FOR BIOPSY: CPT | Mod: S$GLB,,, | Performed by: PHYSICAL MEDICINE & REHABILITATION

## 2023-03-29 PROCEDURE — 20551 NJX 1 TENDON ORIGIN/INSJ: CPT | Mod: 59,LT,S$GLB, | Performed by: PHYSICAL MEDICINE & REHABILITATION

## 2023-03-29 PROCEDURE — 76942 TENDON ORIGIN: LEFT GREATER TROCHANTERIC BURSA/GLUTE TENDON: ICD-10-PCS | Mod: S$GLB,,, | Performed by: PHYSICAL MEDICINE & REHABILITATION

## 2023-03-29 PROCEDURE — 20551 TENDON ORIGIN: LEFT GREATER TROCHANTERIC BURSA/GLUTE TENDON: ICD-10-PCS | Mod: 59,LT,S$GLB, | Performed by: PHYSICAL MEDICINE & REHABILITATION

## 2023-03-29 PROCEDURE — 99999 PR PBB SHADOW E&M-EST. PATIENT-LVL III: CPT | Mod: PBBFAC,,, | Performed by: PHYSICAL MEDICINE & REHABILITATION

## 2023-03-29 PROCEDURE — 99215 OFFICE O/P EST HI 40 MIN: CPT | Mod: 25,S$GLB,, | Performed by: PHYSICAL MEDICINE & REHABILITATION

## 2023-03-29 PROCEDURE — 99999 PR PBB SHADOW E&M-EST. PATIENT-LVL III: ICD-10-PCS | Mod: PBBFAC,,, | Performed by: PHYSICAL MEDICINE & REHABILITATION

## 2023-03-29 PROCEDURE — 99215 PR OFFICE/OUTPT VISIT, EST, LEVL V, 40-54 MIN: ICD-10-PCS | Mod: 25,S$GLB,, | Performed by: PHYSICAL MEDICINE & REHABILITATION

## 2023-03-29 PROCEDURE — 20611 DRAIN/INJ JOINT/BURSA W/US: CPT | Mod: 50,S$GLB,, | Performed by: PHYSICAL MEDICINE & REHABILITATION

## 2023-03-29 PROCEDURE — 20611 LARGE JOINT ASPIRATION/INJECTION: BILATERAL KNEE: ICD-10-PCS | Mod: 50,S$GLB,, | Performed by: PHYSICAL MEDICINE & REHABILITATION

## 2023-03-29 RX ORDER — BETAMETHASONE SODIUM PHOSPHATE AND BETAMETHASONE ACETATE 3; 3 MG/ML; MG/ML
6 INJECTION, SUSPENSION INTRA-ARTICULAR; INTRALESIONAL; INTRAMUSCULAR; SOFT TISSUE
Status: DISCONTINUED | OUTPATIENT
Start: 2023-03-29 | End: 2023-03-29 | Stop reason: HOSPADM

## 2023-03-29 RX ORDER — LIDOCAINE HYDROCHLORIDE 10 MG/ML
4 INJECTION INFILTRATION; PERINEURAL
Status: DISCONTINUED | OUTPATIENT
Start: 2023-03-29 | End: 2023-03-29 | Stop reason: HOSPADM

## 2023-03-29 RX ADMIN — BETAMETHASONE SODIUM PHOSPHATE AND BETAMETHASONE ACETATE 6 MG: 3; 3 INJECTION, SUSPENSION INTRA-ARTICULAR; INTRALESIONAL; INTRAMUSCULAR; SOFT TISSUE at 03:03

## 2023-03-29 RX ADMIN — LIDOCAINE HYDROCHLORIDE 4 ML: 10 INJECTION INFILTRATION; PERINEURAL at 03:03

## 2023-03-29 NOTE — PROGRESS NOTES
[facility]    Consulting Provider: Dr. Kristen Colmenares  PCP: BETO Bailey MD    CHIEF COMPLAINT:   Chief Complaint   Patient presents with    Knee Pain     C/o pain in both knees    Hip Pain     Left lateral       HISTORY OF PRESENT ILLNESS: Opal Sahu is a 72 y.o. female who presents to me for evaluation and management of chronic bilateral knee and left hip pain.    She is a former patient of Dr. Shay last seen 08/31/2022 at which time patient underwent bilateral intra-articular knee hyaluronic acid injections as well as left greater trochanteric bursa steroid injection.    Today reports, presents today for repeat bilateral intra-articular knee hyaluronic acid injections as well as left greater trochanteric bursa steroid injection. Her pain complaints are similar to previous and she historically responds well to HA to knees and steroids to her left hip. Her knee pain is stiffness to anterior knee worse with prolonged inactivity/sitting.     Injections: knees - HA, steroids, stem cell; left hip - steroid  Therapies: has trialed formal PT without long term benefit  Bracing: none  DME: none    Review of Systems   Constitutional: Negative for fever.   HENT: Negative for drooling.    Eyes: Negative for discharge.   Respiratory: Negative for choking.    Cardiovascular: Negative for chest pain.   Genitourinary: Negative for flank pain.   Skin: Negative for wound.   Allergic/Immunologic: Negative for immunocompromised state.   Neurological: Negative for tremors and syncope.   Psychiatric/Behavioral: Negative for behavioral problems.     Past Medical History:   Past Medical History:   Diagnosis Date    Hyperlipidemia     Hypothyroid        Past Surgical History:   Past Surgical History:   Procedure Laterality Date    None         Family History:   Family History   Problem Relation Age of Onset    Heart disease Mother     Cancer Father     Heart disease Brother      Breast cancer Other        Medications:   Current Outpatient Medications on File Prior to Visit   Medication Sig Dispense Refill    aspirin 81 mg Tab Every day      atorvastatin (LIPITOR) 10 MG tablet TAKE ONE TABLET BY MOUTH ONCE DAILY 90 tablet 3    bimatoprost (LATISSE) 0.03 % ophthalmic solution Place one drop on applicator and apply evenly along the skin of the upper eyelid at base of eyelashes once daily at bedtime; repeat procedure for second eye (use a clean applicator). 5 mL 1    celecoxib (CELEBREX) 100 MG capsule Take 1 capsule (100 mg total) by mouth daily as needed for Pain. 30 capsule 2    mupirocin (BACTROBAN) 2 % ointment Apply topically 2 (two) times daily. 22 g 1    mupirocin (BACTROBAN) 2 % ointment Apply topically 3 (three) times daily. 30 g 5    mupirocin calcium 2% (BACTROBAN) 2 % cream Apply topically 2 (two) times daily. 30 g 5    semaglutide (OZEMPIC) 0.25 mg or 0.5 mg(2 mg/1.5 mL) pen injector Inject 0.25 mg into the skin every 7 days. 4 pen 2    SYNTHROID 50 mcg tablet TAKE 1 TABLET BY MOUTH EVERY MORNING 90 tablet 2    tretinoin (RETIN-A) 0.1 % cream Apply topically every evening. 45 g 1    azelastine (ASTELIN) 137 mcg (0.1 %) nasal spray 1 spray (137 mcg total) by Nasal route 2 (two) times daily. 30 mL 0    valACYclovir (VALTREX) 1000 MG tablet Take 2 tablets (2,000 mg total) by mouth 2 (two) times daily. for 1 day 30 tablet 1     No current facility-administered medications on file prior to visit.       Allergies:   Review of patient's allergies indicates:   Allergen Reactions    Povidone-iodine Hives     Other reaction(s): Unknown    Sulfa (sulfonamide antibiotics)      Pharmacy notified us       Social History:   Social History     Socioeconomic History    Marital status:    Occupational History     Employer: [employer]   Tobacco Use    Smoking status: Never    Smokeless tobacco: Never   Substance and Sexual Activity    Alcohol use: Yes    Drug use: No  "      PHYSICAL EXAMINATION:   Vitals:    03/29/23 1546   BP: 129/81   Pulse: 95   Height: 5' 6" (1.676 m)     Constitutional: No apparent distress. Pleasant.  HENT: Trachea midline.  Head: Normocephalic and atraumatic.   Eyes: Right eye exhibits no discharge. Left eye exhibits no discharge. No scleral icterus.   CV: Well perfused.   Pulmonary/Chest: Effort normal. No respiratory distress.   Abdominal: There is no guarding.   Neurological: Awake, alert and cooperative.  SKIN: Intact no apparent lesions, cut, ulcers or abrasions  EXT:  No cyanosis, clubbing, or edema.  SENSORY: Intact to light touch in the bilateral lower extemities.  MUSCKULOSKELETAL:   Muscle Strength:(0-5)                             Right     Left  Hip Flexors                5  5  Hip Abductor              5  5  Hip Adductor              5  5  Knee Extensors          5  5  Knee Flexors              5  5  Ankle Dorsiflex           5  5  Ankle Planterflex        5  5  EHL                            5  5    Reflexes: (0-4+/4)   Right     Left  Patellar(L4)  2+  2+  Ankle(S1)  2+  2+       INSPECTION:                                                                           Right                Left        Localized/Generalized swelling:                     -                       -  Muscle contours normal and symmetrical:     +                      +  Patellas symetrical and level:                         +                      +  Ecchymoses:                                                   -                       -  Erythema:                                                        -                       -  Gross deformity:                                             -                       -     KNEE DEFORMITY:            Right                Left  Normal:                       +                      +  Genu varus:                -                       -  Genu valgum:             -                       -  Genu Recervatum:     -                    "    -     RANGE OF MOTION:           Right                Left  Flexion:                       Full                  Full        Extension:                   Full                  Full  Other:      TENDERNESS:                        Right                Left  Medial Tibial Plateau:             -                       -  Lateral Tibial Plateau:             -                       -  Medial Femoral Condyle:        -                       -  Lateral Femoral Condyle:       -                       -  Head of the Fibula:                 -                       -  Medial joint line:                      +                       -  Lateral joint line:                      -                       -  Patella:                                    +                      +  Medial collateral lig:                -                       -  Lateral collateral lig:                -                       -  Pes Anserine:                         -                       -     SOFT TISSUE PALPATION:                                       Right                Left  Baker's cyst:                -                       -             Effusion:                      -                       -  Ballottement:               -                       -  Other:                          -                       -      JOINT STABILITY:           Right                Left  Medial collateral lig:    -                       -  Lateral collateral lig:    -                      -  Anterior draw sign:      -                      -  Posterior draw sign:    -                       -  Lachmans:                  -                       -     SPECIAL TESTS:                   Right                Left  Kaitlin Test:                       -                       -  Patella  Grinding Test:            +                       +  Knee Joint Effusion Test:        -                       -     Limited examination of the left hip with noted tenderness to great troch region  without palpable mass or nodule.     Imaging  XR knee from 06/14/2017 with right greater than left tricompartmental osteoarthritis with medial greater than lateral space narrowing, Kellgren Sonido grade 1    Data Reviewed: X-ray    Supportive Actions: Independent visualization of images or test specimens.    ASSESSMENT:   1. Primary osteoarthritis of both knees    2. Chronic pain of both knees    3. Patellofemoral pain syndrome of both knees    4. Tendinopathy of left gluteus medius        PLAN:   1. Time was spent reviewing the above diagnosis in depth with Opal today, including acute management and rehabilitation.     2. Examination today most consistent with chronic intra-articular bilateral knee pain with underlying patellofemoral syndrome. We discussed options for management of her pain would be to consider injection of either steroid, hyaluronic acid, or platelet rich plasma (PRP). The use, benefits, risks, and expectations of all of these types of injections was discussed at length with her today. At this time, she elects to proceed with bilateral ultrasound-guided intra-articular knee hyaluronic acid injections. This procedure was completed today in clinic. Please see procedure note for further details.  We can repeat this every 6 months if appropriate.    3. With regards to her left hip pain, examination most consistent with left greater trochanteric bursa versus gluteal tendinopathy. We discussed options for management of her pain would be to consider injection of steroid. The use, benefits, risks, and expectations of all of these types of injections was discussed at length with her today. At this time, she elects to proceed with ultrasound-guided left greater trochanter bursa/glute tendon steroid injection. This procedure was completed today in clinic. Please see procedure note for further details.  We can repeat this every 3 months if appropriate.    4. May consider need for formal PT for underlying  bilateral patellofemoral syndrome with aquatic therapy.     RTC as needed.     40 minutes of total time spent on the encounter, which includes face to face time and non-face to face time preparing to see the patient (eg, review of tests), obtaining and/or reviewing separately obtained history, documenting clinical information in the electronic or other health record, independently interpreting results (not separately reported), communicating results to the patient/family/caregiver, and/or care coordination (not separately reported).

## 2023-03-29 NOTE — PROCEDURES
Large Joint Aspiration/Injection: bilateral knee    Date/Time: 3/29/2023 3:30 PM  Performed by: Kristen Colmenares DO  Authorized by: Kristen Colmenares, DO     Consent Done?:  Yes (Verbal)  Indications:  Arthritis, diagnostic evaluation and pain  Site marked: the procedure site was marked    Timeout: prior to procedure the correct patient, procedure, and site was verified    Prep: patient was prepped and draped in usual sterile fashion    Local anesthesia used?: No (ethyl chloride spray)      Details:  Needle Size:  22 G  Ultrasonic Guidance for needle placement?: Yes    Images are saved and documented.  Approach:  Lateral  Location:  Knee  Laterality:  Bilateral  Site:  Bilateral knee  Medications (Right):  48 mg hylan g-f 20 48 mg/6 mL  Medications (Left):  48 mg hylan g-f 20 48 mg/6 mL  Patient tolerance:  Patient tolerated the procedure well with no immediate complications     Ultrasound guidance was used for correct needle placement, the images were saved will be uploaded to EMR.  Tendon Origin: left greater trochanteric bursa/glute tendon    Date/Time: 3/29/2023 3:30 PM  Performed by: Kristen Colmenares DO  Authorized by: Kristen Colmenares, DO     Consent Done?:  Yes (Verbal)  Timeout: prior to procedure the correct patient, procedure, and site was verified    Indications:  Diagnostic evaluation and pain  Site marked: the procedure site was marked    Timeout: prior to procedure the correct patient, procedure, and site was verified    Location:  Hip  Hip joint: Left GT bursa/glute tendon.  Prep: patient was prepped and draped in usual sterile fashion    Ultrasonic Guidance for Needle Placement?: Yes    Needle size:  22 G (spinal)  Approach:  Posterolateral  Medications:  4 mL LIDOcaine HCL 10 mg/ml (1%) 10 mg/mL (1 %); 6 mg betamethasone acetate-betamethasone sodium phosphate 6 mg/mL  Patient tolerance:  Patient tolerated the procedure well with no immediate complications   Ultrasound guidance was used for correct  needle placement, the images were saved will be uploaded to EMR.

## 2023-05-17 ENCOUNTER — PATIENT MESSAGE (OUTPATIENT)
Dept: FAMILY MEDICINE | Facility: CLINIC | Age: 73
End: 2023-05-17
Payer: COMMERCIAL

## 2023-05-30 ENCOUNTER — LAB VISIT (OUTPATIENT)
Dept: LAB | Facility: HOSPITAL | Age: 73
End: 2023-05-30
Attending: FAMILY MEDICINE
Payer: COMMERCIAL

## 2023-05-30 DIAGNOSIS — Z00.00 WELLNESS EXAMINATION: ICD-10-CM

## 2023-05-30 LAB
ALBUMIN SERPL BCP-MCNC: 3.8 G/DL (ref 3.5–5.2)
ALP SERPL-CCNC: 56 U/L (ref 55–135)
ALT SERPL W/O P-5'-P-CCNC: 16 U/L (ref 10–44)
ANION GAP SERPL CALC-SCNC: 10 MMOL/L (ref 8–16)
AST SERPL-CCNC: 11 U/L (ref 10–40)
BILIRUB SERPL-MCNC: 1.1 MG/DL (ref 0.1–1)
BUN SERPL-MCNC: 14 MG/DL (ref 8–23)
CALCIUM SERPL-MCNC: 9.4 MG/DL (ref 8.7–10.5)
CHLORIDE SERPL-SCNC: 106 MMOL/L (ref 95–110)
CHOLEST SERPL-MCNC: 194 MG/DL (ref 120–199)
CHOLEST/HDLC SERPL: 3.7 {RATIO} (ref 2–5)
CO2 SERPL-SCNC: 25 MMOL/L (ref 23–29)
CREAT SERPL-MCNC: 0.7 MG/DL (ref 0.5–1.4)
ERYTHROCYTE [DISTWIDTH] IN BLOOD BY AUTOMATED COUNT: 12.8 % (ref 11.5–14.5)
EST. GFR  (NO RACE VARIABLE): >60 ML/MIN/1.73 M^2
GLUCOSE SERPL-MCNC: 101 MG/DL (ref 70–110)
HCT VFR BLD AUTO: 39.6 % (ref 37–48.5)
HDLC SERPL-MCNC: 52 MG/DL (ref 40–75)
HDLC SERPL: 26.8 % (ref 20–50)
HGB BLD-MCNC: 12.9 G/DL (ref 12–16)
LDLC SERPL CALC-MCNC: 118.8 MG/DL (ref 63–159)
MCH RBC QN AUTO: 28.9 PG (ref 27–31)
MCHC RBC AUTO-ENTMCNC: 32.6 G/DL (ref 32–36)
MCV RBC AUTO: 89 FL (ref 82–98)
NONHDLC SERPL-MCNC: 142 MG/DL
PLATELET # BLD AUTO: 383 K/UL (ref 150–450)
PMV BLD AUTO: 9.1 FL (ref 9.2–12.9)
POTASSIUM SERPL-SCNC: 4.2 MMOL/L (ref 3.5–5.1)
PROT SERPL-MCNC: 6.6 G/DL (ref 6–8.4)
RBC # BLD AUTO: 4.46 M/UL (ref 4–5.4)
SODIUM SERPL-SCNC: 141 MMOL/L (ref 136–145)
TRIGL SERPL-MCNC: 116 MG/DL (ref 30–150)
TSH SERPL DL<=0.005 MIU/L-ACNC: 0.6 UIU/ML (ref 0.4–4)
WBC # BLD AUTO: 7.11 K/UL (ref 3.9–12.7)

## 2023-05-30 PROCEDURE — 80061 LIPID PANEL: CPT | Performed by: FAMILY MEDICINE

## 2023-05-30 PROCEDURE — 85027 COMPLETE CBC AUTOMATED: CPT | Performed by: FAMILY MEDICINE

## 2023-05-30 PROCEDURE — 80053 COMPREHEN METABOLIC PANEL: CPT | Performed by: FAMILY MEDICINE

## 2023-05-30 PROCEDURE — 36415 COLL VENOUS BLD VENIPUNCTURE: CPT | Mod: PN | Performed by: FAMILY MEDICINE

## 2023-05-30 PROCEDURE — 84443 ASSAY THYROID STIM HORMONE: CPT | Performed by: FAMILY MEDICINE

## 2023-06-01 ENCOUNTER — OFFICE VISIT (OUTPATIENT)
Dept: FAMILY MEDICINE | Facility: CLINIC | Age: 73
End: 2023-06-01
Payer: COMMERCIAL

## 2023-06-01 ENCOUNTER — HOSPITAL ENCOUNTER (OUTPATIENT)
Dept: RADIOLOGY | Facility: HOSPITAL | Age: 73
Discharge: HOME | End: 2023-06-01
Attending: FAMILY MEDICINE
Payer: COMMERCIAL

## 2023-06-01 VITALS
OXYGEN SATURATION: 95 % | SYSTOLIC BLOOD PRESSURE: 128 MMHG | HEART RATE: 97 BPM | BODY MASS INDEX: 23.09 KG/M2 | DIASTOLIC BLOOD PRESSURE: 86 MMHG | HEIGHT: 66 IN

## 2023-06-01 DIAGNOSIS — E04.1 THYROID NODULE: ICD-10-CM

## 2023-06-01 DIAGNOSIS — E03.4 HYPOTHYROIDISM DUE TO ACQUIRED ATROPHY OF THYROID: Chronic | ICD-10-CM

## 2023-06-01 DIAGNOSIS — E03.9 HYPOTHYROIDISM, UNSPECIFIED TYPE: Chronic | ICD-10-CM

## 2023-06-01 DIAGNOSIS — E78.00 HYPERCHOLESTEREMIA: Primary | Chronic | ICD-10-CM

## 2023-06-01 PROCEDURE — 3079F PR MOST RECENT DIASTOLIC BLOOD PRESSURE 80-89 MM HG: ICD-10-PCS | Mod: CPTII,S$GLB,, | Performed by: FAMILY MEDICINE

## 2023-06-01 PROCEDURE — 1126F AMNT PAIN NOTED NONE PRSNT: CPT | Mod: CPTII,S$GLB,, | Performed by: FAMILY MEDICINE

## 2023-06-01 PROCEDURE — 3288F FALL RISK ASSESSMENT DOCD: CPT | Mod: CPTII,S$GLB,, | Performed by: FAMILY MEDICINE

## 2023-06-01 PROCEDURE — 76536 US EXAM OF HEAD AND NECK: CPT | Mod: TC,PO

## 2023-06-01 PROCEDURE — 3008F PR BODY MASS INDEX (BMI) DOCUMENTED: ICD-10-PCS | Mod: CPTII,S$GLB,, | Performed by: FAMILY MEDICINE

## 2023-06-01 PROCEDURE — 3008F BODY MASS INDEX DOCD: CPT | Mod: CPTII,S$GLB,, | Performed by: FAMILY MEDICINE

## 2023-06-01 PROCEDURE — 1101F PR PT FALLS ASSESS DOC 0-1 FALLS W/OUT INJ PAST YR: ICD-10-PCS | Mod: CPTII,S$GLB,, | Performed by: FAMILY MEDICINE

## 2023-06-01 PROCEDURE — 76536 US EXAM OF HEAD AND NECK: CPT | Mod: 26,,, | Performed by: RADIOLOGY

## 2023-06-01 PROCEDURE — 99214 PR OFFICE/OUTPT VISIT, EST, LEVL IV, 30-39 MIN: ICD-10-PCS | Mod: S$GLB,,, | Performed by: FAMILY MEDICINE

## 2023-06-01 PROCEDURE — 3074F PR MOST RECENT SYSTOLIC BLOOD PRESSURE < 130 MM HG: ICD-10-PCS | Mod: CPTII,S$GLB,, | Performed by: FAMILY MEDICINE

## 2023-06-01 PROCEDURE — 76536 US SOFT TISSUE HEAD NECK THYROID: ICD-10-PCS | Mod: 26,,, | Performed by: RADIOLOGY

## 2023-06-01 PROCEDURE — 1159F MED LIST DOCD IN RCRD: CPT | Mod: CPTII,S$GLB,, | Performed by: FAMILY MEDICINE

## 2023-06-01 PROCEDURE — 1159F PR MEDICATION LIST DOCUMENTED IN MEDICAL RECORD: ICD-10-PCS | Mod: CPTII,S$GLB,, | Performed by: FAMILY MEDICINE

## 2023-06-01 PROCEDURE — 1126F PR PAIN SEVERITY QUANTIFIED, NO PAIN PRESENT: ICD-10-PCS | Mod: CPTII,S$GLB,, | Performed by: FAMILY MEDICINE

## 2023-06-01 PROCEDURE — 99999 PR PBB SHADOW E&M-EST. PATIENT-LVL III: ICD-10-PCS | Mod: PBBFAC,,, | Performed by: FAMILY MEDICINE

## 2023-06-01 PROCEDURE — 99214 OFFICE O/P EST MOD 30 MIN: CPT | Mod: S$GLB,,, | Performed by: FAMILY MEDICINE

## 2023-06-01 PROCEDURE — 1101F PT FALLS ASSESS-DOCD LE1/YR: CPT | Mod: CPTII,S$GLB,, | Performed by: FAMILY MEDICINE

## 2023-06-01 PROCEDURE — 99999 PR PBB SHADOW E&M-EST. PATIENT-LVL III: CPT | Mod: PBBFAC,,, | Performed by: FAMILY MEDICINE

## 2023-06-01 PROCEDURE — 3288F PR FALLS RISK ASSESSMENT DOCUMENTED: ICD-10-PCS | Mod: CPTII,S$GLB,, | Performed by: FAMILY MEDICINE

## 2023-06-01 PROCEDURE — 3079F DIAST BP 80-89 MM HG: CPT | Mod: CPTII,S$GLB,, | Performed by: FAMILY MEDICINE

## 2023-06-01 PROCEDURE — 3074F SYST BP LT 130 MM HG: CPT | Mod: CPTII,S$GLB,, | Performed by: FAMILY MEDICINE

## 2023-06-01 RX ORDER — SEMAGLUTIDE 1.34 MG/ML
0.25 INJECTION, SOLUTION SUBCUTANEOUS
Qty: 4 EACH | Refills: 2 | Status: SHIPPED | OUTPATIENT
Start: 2023-06-01 | End: 2023-08-02

## 2023-06-01 RX ORDER — VALACYCLOVIR HYDROCHLORIDE 1 G/1
2000 TABLET, FILM COATED ORAL 2 TIMES DAILY
Qty: 30 TABLET | Refills: 1 | Status: SHIPPED | OUTPATIENT
Start: 2023-06-01 | End: 2024-01-04

## 2023-06-01 NOTE — PROGRESS NOTES
Subjective:       Patient ID: Opal Sahu is a 72 y.o. female.    Chief Complaint: Follow-up    Here for follow up multiple chronic medical issues. Doing well overall and in normal state of health.      Follow-up  Pertinent negatives include no chest pain, chills, coughing or fever.   Review of Systems   Constitutional:  Negative for chills and fever.   Respiratory:  Negative for cough, chest tightness and shortness of breath.    Cardiovascular:  Negative for chest pain, palpitations and leg swelling.   Endocrine: Negative for cold intolerance and heat intolerance.   Psychiatric/Behavioral:  Negative for decreased concentration. The patient is not nervous/anxious.      Objective:      Physical Exam  Vitals and nursing note reviewed.   Constitutional:       Appearance: She is well-developed.   HENT:      Head: Normocephalic and atraumatic.   Cardiovascular:      Rate and Rhythm: Normal rate and regular rhythm.      Heart sounds: Normal heart sounds.   Pulmonary:      Effort: Pulmonary effort is normal.      Breath sounds: Normal breath sounds.   Psychiatric:         Mood and Affect: Mood normal.         Behavior: Behavior normal.       Assessment:       1. Hypercholesteremia    2. Hypothyroidism, unspecified type    3. Hypothyroidism due to acquired atrophy of thyroid        Plan:       Hypercholesteremia  -     Comprehensive Metabolic Panel; Future; Expected date: 06/01/2023  -     CBC Without Differential; Future; Expected date: 06/01/2023  -     Lipid Panel; Future; Expected date: 06/01/2023  -     TSH; Future; Expected date: 06/01/2023    Hypothyroidism, unspecified type  -     Comprehensive Metabolic Panel; Future; Expected date: 06/01/2023  -     CBC Without Differential; Future; Expected date: 06/01/2023  -     Lipid Panel; Future; Expected date: 06/01/2023  -     TSH; Future; Expected date: 06/01/2023    Hypothyroidism due to acquired atrophy of thyroid    Other orders  -     valACYclovir (VALTREX) 1000  MG tablet; Take 2 tablets (2,000 mg total) by mouth 2 (two) times daily. for 1 day  Dispense: 30 tablet; Refill: 1  -     semaglutide (OZEMPIC) 0.25 mg or 0.5 mg(2 mg/1.5 mL) pen injector; Inject 0.25 mg into the skin every 7 days.  Dispense: 4 each; Refill: 2      Reviewed labs and at goal  Lipid improved  Will monitor chronic medical issues and continue current plan of care.    Follow up in about 6 months (around 12/1/2023), or if symptoms worsen or fail to improve.

## 2023-06-02 NOTE — TELEPHONE ENCOUNTER
----- Message from Corinne Peterson sent at 6/2/2023  9:49 AM CDT -----  Contact: Self  Type:  RX Refill Request    Who Called:  Patient  Refill or New Rx:  New Rx  RX Name and Strength:  bimatoprost (LATISSE) 0.03 % ophthalmic solution  How is the patient currently taking it? (ex. 1XDay):  As Directed  Is this a 30 day or 90 day RX:  90  Preferred Pharmacy with phone number:    [facility] [address] [facility]  [address] [address]  Phone: [phone] Fax: [phone]  Local or Mail Order:  Local  Ordering Provider:  Dr Leo Lara Call Back Number:  [phone]  Additional Information:  Thank You

## 2023-06-06 RX ORDER — BIMATOPROST 3 UG/ML
SOLUTION TOPICAL
Qty: 5 ML | Refills: 1 | Status: SHIPPED | OUTPATIENT
Start: 2023-06-06 | End: 2024-01-04

## 2023-06-09 RX ORDER — TRETINOIN 1 MG/G
CREAM TOPICAL
Qty: 45 G | Refills: 1 | Status: SHIPPED | OUTPATIENT
Start: 2023-06-09 | End: 2023-08-02

## 2023-07-02 DIAGNOSIS — E03.4 HYPOTHYROIDISM DUE TO ACQUIRED ATROPHY OF THYROID: Chronic | ICD-10-CM

## 2023-07-02 NOTE — TELEPHONE ENCOUNTER
Care Due:                  Date            Visit Type   Department     Provider  --------------------------------------------------------------------------------                                EP -                              [facility]  Last Visit: 06-      CARE ([facility])   NICK WEATHERS                              EP -                              PRIMARY      [facility]  Next Visit: 12-      CARE ([facility])   NICK WEATHERS                                                            Last  Test          Frequency    Reason                     Performed    Due Date  --------------------------------------------------------------------------------    HBA1C.......  6 months...  semaglutide..............  Not Found    Overdue    [employer] Embedded Care Due Messages. Reference number: [id number].   7/02/2023 8:46:56 AM CDT

## 2023-07-03 RX ORDER — LEVOTHYROXINE SODIUM 50 UG/1
50 TABLET ORAL EVERY MORNING
Qty: 90 TABLET | Refills: 3 | Status: SHIPPED | OUTPATIENT
Start: 2023-07-03

## 2023-07-03 NOTE — TELEPHONE ENCOUNTER
Provider Staff:  Action required for this patient     Please see care gap opportunities below in Care Due Message.    Thanks!  [facility]     Appointments      Date Provider   Last Visit   6/1/2023 JOLENE Bailey MD   Next Visit   12/21/2023 JOLENE Bailey MD     Refill Decision Note   Opal Sahu  is requesting a refill authorization.  Brief Assessment and Rationale for Refill:  Approve     Medication Therapy Plan:         Alert overridden per protocol: Yes   Comments:     Note composed:10:55 AM 07/03/2023

## 2023-07-27 ENCOUNTER — TELEPHONE (OUTPATIENT)
Dept: FAMILY MEDICINE | Facility: CLINIC | Age: 73
End: 2023-07-27
Payer: COMMERCIAL

## 2023-07-27 NOTE — TELEPHONE ENCOUNTER
----- Message from Corinne Peterson sent at 7/27/2023 10:59 AM CDT -----  Contact: Self  Type:  RX Refill Request # Needs Increased dosage on Both Meds below#    Who Called:  Patient  Refill or New Rx:  New Rx  RX Name and Strength:  semaglutide (OZEMPIC) 1mg pen injector, tretinoin (RETIN-A) 0.5% cream  How is the patient currently taking it? (ex. 1XDay):  As Directed  Is this a 30 day or 90 day RX:  30  Preferred Pharmacy with phone number:    [facility]/pharmacy #[id number] - [place] - [address] [facility] [address]  [address] [facility] [address]  [place] [address]  Phone: [phone] Fax: [phone]  Local or Mail Order:  Local  Ordering Provider:  Dr Leo Lara Call Back Number:  [phone]  Additional Information:  Pt is requesting an increase in dosage on both medications, stating they are not effecting and needs the Ozempic increased to the next dosage at 1% and the cream increased to the next higher available. Thank You

## 2023-07-27 NOTE — TELEPHONE ENCOUNTER
----- Message from Corinne Peterson sent at 7/27/2023 11:04 AM CDT -----  Contact: Self  Patient is calling to get her annual mammogram scheduled for the end of August. Can we please have Dr Bailey put in the order and call pt back to schedule at [phone]. Thank You.

## 2023-07-28 ENCOUNTER — PATIENT MESSAGE (OUTPATIENT)
Dept: FAMILY MEDICINE | Facility: CLINIC | Age: 73
End: 2023-07-28
Payer: COMMERCIAL

## 2023-07-28 DIAGNOSIS — Z12.31 ENCOUNTER FOR SCREENING MAMMOGRAM FOR BREAST CANCER: Primary | ICD-10-CM

## 2023-07-28 NOTE — TELEPHONE ENCOUNTER
No care due was identified.  [facility] Embedded Care Due Messages. Reference number: [id number].   7/28/2023 12:26:15 PM CDT

## 2023-07-28 NOTE — TELEPHONE ENCOUNTER
Patient requesting dose change on her medication. Also an order for a mammo. Orders pended. Please advise.

## 2023-08-01 RX ORDER — SEMAGLUTIDE 1.34 MG/ML
1 INJECTION, SOLUTION SUBCUTANEOUS
Qty: 3 ML | Refills: 11 | OUTPATIENT
Start: 2023-08-01 | End: 2024-07-31

## 2023-08-01 RX ORDER — TRETINOIN 0.5 MG/G
CREAM TOPICAL NIGHTLY
OUTPATIENT
Start: 2023-08-01

## 2023-08-02 ENCOUNTER — PATIENT MESSAGE (OUTPATIENT)
Dept: FAMILY MEDICINE | Facility: CLINIC | Age: 73
End: 2023-08-02
Payer: COMMERCIAL

## 2023-08-02 RX ORDER — SEMAGLUTIDE 1.34 MG/ML
1 INJECTION, SOLUTION SUBCUTANEOUS
Qty: 3 ML | Refills: 2 | Status: SHIPPED | OUTPATIENT
Start: 2023-08-02 | End: 2024-01-04

## 2023-08-02 RX ORDER — TRETINOIN 0.5 MG/G
CREAM TOPICAL NIGHTLY
Qty: 45 G | Refills: 1 | Status: SHIPPED | OUTPATIENT
Start: 2023-08-02 | End: 2024-03-12 | Stop reason: SDUPTHER

## 2023-08-31 ENCOUNTER — HOSPITAL ENCOUNTER (OUTPATIENT)
Dept: RADIOLOGY | Facility: HOSPITAL | Age: 73
Discharge: HOME | End: 2023-08-31
Attending: FAMILY MEDICINE
Payer: COMMERCIAL

## 2023-08-31 DIAGNOSIS — Z12.31 ENCOUNTER FOR SCREENING MAMMOGRAM FOR BREAST CANCER: ICD-10-CM

## 2023-08-31 PROCEDURE — 77063 MAMMO DIGITAL SCREENING BILAT WITH TOMO: ICD-10-PCS | Mod: 26,,, | Performed by: RADIOLOGY

## 2023-08-31 PROCEDURE — 77063 BREAST TOMOSYNTHESIS BI: CPT | Mod: 26,,, | Performed by: RADIOLOGY

## 2023-08-31 PROCEDURE — 77067 SCR MAMMO BI INCL CAD: CPT | Mod: TC,PO

## 2023-08-31 PROCEDURE — 77067 SCR MAMMO BI INCL CAD: CPT | Mod: 26,,, | Performed by: RADIOLOGY

## 2023-08-31 PROCEDURE — 77067 MAMMO DIGITAL SCREENING BILAT WITH TOMO: ICD-10-PCS | Mod: 26,,, | Performed by: RADIOLOGY

## 2023-09-11 ENCOUNTER — TELEPHONE (OUTPATIENT)
Dept: PHYSICAL MEDICINE AND REHAB | Facility: CLINIC | Age: 73
End: 2023-09-11
Payer: COMMERCIAL

## 2023-09-11 NOTE — TELEPHONE ENCOUNTER
----- Message from Mary Carpio sent at 9/11/2023  3:04 PM CDT -----  Contact: Pt  Type:  Patient Returning Call    Who Called:Pt  Who Left Message for Patient:Deanna  Does the patient know what this is regarding?:yes  Would the patient rather a call back or a response via [facility]? call  Best Call Back Number:[phone]  Additional Information: Pt is returning Deanna's call. Please call pt back to advise.

## 2023-09-11 NOTE — TELEPHONE ENCOUNTER
Left voicemail for pt in regards to schedule appointment with Dr. Colmenares per pt request.   
Performed

## 2023-09-11 NOTE — TELEPHONE ENCOUNTER
Left voicemail for pt in regards to return pt call about scheduling appointment with Dr. Colmenares.

## 2023-09-12 ENCOUNTER — TELEPHONE (OUTPATIENT)
Dept: PHYSICAL MEDICINE AND REHAB | Facility: CLINIC | Age: 73
End: 2023-09-12
Payer: COMMERCIAL

## 2023-09-12 NOTE — TELEPHONE ENCOUNTER
----- Message from Susu Pacheco sent at 9/12/2023  4:07 PM CDT -----  Contact: pt  Type:  Patient Returning Call    Who Called:  patient  Who Left Message for Patient:  kosta  Does the patient know what this is regarding?:    Best Call Back Number:  [phone] (home)   Additional Information:

## 2023-09-12 NOTE — TELEPHONE ENCOUNTER
----- Message from Mary [place] sent at 9/12/2023  3:18 PM CDT -----  Contact: self  Type:  Patient Returning Call    Who Called:self  Who Left Message for Patient:Deanna  Does the patient know what this is regarding?:yes, appt  Would the patient rather a call back or a response via [facility]? call  Best Call Back Number:[phone] (home)     Additional Information: please advise and thank you.

## 2023-09-12 NOTE — TELEPHONE ENCOUNTER
Returned pt call. Pt stated she wanted the gel injection for both her knees and her hip injection. Appointment was made.

## 2023-09-13 DIAGNOSIS — M17.0 PRIMARY OSTEOARTHRITIS OF BOTH KNEES: Primary | ICD-10-CM

## 2023-09-13 DIAGNOSIS — G89.29 CHRONIC PAIN OF BOTH KNEES: ICD-10-CM

## 2023-09-13 DIAGNOSIS — M25.562 CHRONIC PAIN OF BOTH KNEES: ICD-10-CM

## 2023-09-13 DIAGNOSIS — M25.561 CHRONIC PAIN OF BOTH KNEES: ICD-10-CM

## 2023-09-20 ENCOUNTER — TELEPHONE (OUTPATIENT)
Dept: PHYSICAL MEDICINE AND REHAB | Facility: CLINIC | Age: 73
End: 2023-09-20
Payer: COMMERCIAL

## 2023-09-20 NOTE — TELEPHONE ENCOUNTER
This nurse LVM  confirming pts appointment day/time and for both knees. No further needs at this time.   Rosie Keys RN      ----- Message from Lidya Terry sent at 9/20/2023  1:57 PM CDT -----  Type: Needs Medical Advice  Who Called:  pt  Symptoms (please be specific):  pt said she need to speak to the nurse she need to verify that the office has 2 shots for her knees--please call and advice--said she need both knees done  Best Call Back Number: [phone] (home)     Additional Information: thank you

## 2023-10-03 ENCOUNTER — CLINICAL SUPPORT (OUTPATIENT)
Dept: PHYSICAL MEDICINE AND REHAB | Facility: CLINIC | Age: 73
End: 2023-10-03
Payer: COMMERCIAL

## 2023-10-03 VITALS
BODY MASS INDEX: 22.15 KG/M2 | HEART RATE: 104 BPM | SYSTOLIC BLOOD PRESSURE: 125 MMHG | HEIGHT: 69 IN | DIASTOLIC BLOOD PRESSURE: 69 MMHG

## 2023-10-03 DIAGNOSIS — M17.0 PRIMARY OSTEOARTHRITIS OF BOTH KNEES: Primary | ICD-10-CM

## 2023-10-03 DIAGNOSIS — M25.562 CHRONIC PAIN OF BOTH KNEES: ICD-10-CM

## 2023-10-03 DIAGNOSIS — G89.29 CHRONIC PAIN OF BOTH KNEES: ICD-10-CM

## 2023-10-03 DIAGNOSIS — M67.952 TENDINOPATHY OF LEFT GLUTEUS MEDIUS: ICD-10-CM

## 2023-10-03 DIAGNOSIS — M25.561 CHRONIC PAIN OF BOTH KNEES: ICD-10-CM

## 2023-10-03 PROCEDURE — 20551 NJX 1 TENDON ORIGIN/INSJ: CPT | Mod: S$GLB,,, | Performed by: PHYSICAL MEDICINE & REHABILITATION

## 2023-10-03 PROCEDURE — 99214 OFFICE O/P EST MOD 30 MIN: CPT | Mod: 25,S$GLB,, | Performed by: PHYSICAL MEDICINE & REHABILITATION

## 2023-10-03 PROCEDURE — 76942: ICD-10-PCS | Mod: S$GLB,,, | Performed by: PHYSICAL MEDICINE & REHABILITATION

## 2023-10-03 PROCEDURE — 99999 PR PBB SHADOW E&M-EST. PATIENT-LVL IV: ICD-10-PCS | Mod: PBBFAC,,, | Performed by: PHYSICAL MEDICINE & REHABILITATION

## 2023-10-03 PROCEDURE — 99214 PR OFFICE/OUTPT VISIT, EST, LEVL IV, 30-39 MIN: ICD-10-PCS | Mod: 25,S$GLB,, | Performed by: PHYSICAL MEDICINE & REHABILITATION

## 2023-10-03 PROCEDURE — 99999 PR PBB SHADOW E&M-EST. PATIENT-LVL IV: CPT | Mod: PBBFAC,,, | Performed by: PHYSICAL MEDICINE & REHABILITATION

## 2023-10-03 PROCEDURE — 76942 ECHO GUIDE FOR BIOPSY: CPT | Mod: S$GLB,,, | Performed by: PHYSICAL MEDICINE & REHABILITATION

## 2023-10-03 PROCEDURE — 20611 LARGE JOINT ASPIRATION/INJECTION: BILATERAL KNEE: ICD-10-PCS | Mod: 59,LT,, | Performed by: PHYSICAL MEDICINE & REHABILITATION

## 2023-10-03 PROCEDURE — 20551: ICD-10-PCS | Mod: S$GLB,,, | Performed by: PHYSICAL MEDICINE & REHABILITATION

## 2023-10-03 PROCEDURE — 20611 DRAIN/INJ JOINT/BURSA W/US: CPT | Mod: 59,LT,, | Performed by: PHYSICAL MEDICINE & REHABILITATION

## 2023-10-03 RX ORDER — LIDOCAINE HYDROCHLORIDE 10 MG/ML
4 INJECTION INFILTRATION; PERINEURAL
Status: DISCONTINUED | OUTPATIENT
Start: 2023-10-03 | End: 2023-10-03 | Stop reason: HOSPADM

## 2023-10-03 RX ORDER — BETAMETHASONE SODIUM PHOSPHATE AND BETAMETHASONE ACETATE 3; 3 MG/ML; MG/ML
6 INJECTION, SUSPENSION INTRA-ARTICULAR; INTRALESIONAL; INTRAMUSCULAR; SOFT TISSUE
Status: DISCONTINUED | OUTPATIENT
Start: 2023-10-03 | End: 2023-10-03 | Stop reason: HOSPADM

## 2023-10-03 RX ADMIN — LIDOCAINE HYDROCHLORIDE 4 ML: 10 INJECTION INFILTRATION; PERINEURAL at 03:10

## 2023-10-03 RX ADMIN — BETAMETHASONE SODIUM PHOSPHATE AND BETAMETHASONE ACETATE 6 MG: 3; 3 INJECTION, SUSPENSION INTRA-ARTICULAR; INTRALESIONAL; INTRAMUSCULAR; SOFT TISSUE at 03:10

## 2023-10-03 NOTE — PROCEDURES
Large Joint Aspiration/Injection: bilateral knee    Date/Time: 10/3/2023 3:00 PM    Performed by: Kristen Colmenares DO  Authorized by: Kristen Colmenares, DO    Consent Done?:  Yes (Verbal)  Indications:  Arthritis, diagnostic evaluation and pain  Site marked: the procedure site was marked    Timeout: prior to procedure the correct patient, procedure, and site was verified    Prep: patient was prepped and draped in usual sterile fashion    Local anesthesia used?: No (ethyl chloride spray)      Details:  Needle Size:  22 G  Ultrasonic Guidance for needle placement?: Yes    Images are saved and documented.  Approach:  Lateral  Location:  Knee  Laterality:  Bilateral  Site:  Bilateral knee  Medications (Right):  48 mg hylan g-f 20 48 mg/6 mL  Medications (Left):  48 mg hylan g-f 20 48 mg/6 mL  Patient tolerance:  Patient tolerated the procedure well with no immediate complications     Ultrasound guidance was used for correct needle placement, the images were saved will be uploaded to EMR.  Tendon Origin: left greater trochanteric bursa/gluteal tendon: L hip joint    Date/Time: 10/3/2023 3:00 PM    Performed by: Kristen Colmenares DO  Authorized by: Kristen Colmenares, DO    Consent Done?:  Yes (Verbal)  Timeout: prior to procedure the correct patient, procedure, and site was verified    Indications:  Diagnostic evaluation and pain  Site marked: the procedure site was marked    Timeout: prior to procedure the correct patient, procedure, and site was verified    Location:  Hip  Site:  L hip joint (Left GT bursa/glute tendon)  Prep: patient was prepped and draped in usual sterile fashion    Ultrasonic Guidance for Needle Placement?: Yes    Needle size:  22 G (spinal)  Approach:  Posterolateral  Medications:  4 mL LIDOcaine HCL 10 mg/ml (1%) 10 mg/mL (1 %); 6 mg betamethasone acetate-betamethasone sodium phosphate 6 mg/mL  Patient tolerance:  Patient tolerated the procedure well with no immediate complications   Ultrasound  guidance was used for correct needle placement, the images were saved will be uploaded to EMR.

## 2023-10-03 NOTE — PROGRESS NOTES
[facility]    Consulting Provider: Dr. Kristen Colmenares  PCP: JOLENE Bailey MD    CHIEF COMPLAINT:   Chief Complaint   Patient presents with    Knee Pain     C/o pain in both knees    Hip Pain     Left lateral       HISTORY OF PRESENT ILLNESS: Opal Sahu is a 73 y.o. female who presents to me for evaluation and management of chronic bilateral knee and left hip pain.    Today reports,   F/u bilateral knee pain: endorses fullness and achiness to her knees, right worse than the left. Worse with prolonged ambulation and activities. Requesting repeat bilateral knee hyaluronic acid injections today.     F/u left GTB/hip pain: continued left lateral buttock pain with some radiation to lateral thigh. Requesting repeat steroid injection today.     Injections:   - bilateral knee stem cell  - bilateral knee HA 03/29/2023  - left GTB steroid 03/29/2023  Therapies: has trialed formal PT without long term benefit  Bracing: none  DME: none    Review of Systems   Constitutional: Negative for fever.   HENT: Negative for drooling.    Eyes: Negative for discharge.   Respiratory: Negative for choking.    Cardiovascular: Negative for chest pain.   Genitourinary: Negative for flank pain.   Skin: Negative for wound.   Allergic/Immunologic: Negative for immunocompromised state.   Neurological: Negative for tremors and syncope.   Psychiatric/Behavioral: Negative for behavioral problems.     Past Medical History:   Past Medical History:   Diagnosis Date    Hyperlipidemia     Hypothyroid        Past Surgical History:   Past Surgical History:   Procedure Laterality Date    None         Family History:   Family History   Problem Relation Age of Onset    Heart disease Mother     Cancer Father     Heart disease Brother     Breast cancer Other        Medications:   Current Outpatient Medications on File Prior to Visit   Medication Sig Dispense Refill    aspirin 81 mg Tab Every day       "atorvastatin (LIPITOR) 10 MG tablet TAKE ONE TABLET BY MOUTH ONCE DAILY 90 tablet 3    azelastine (ASTELIN) 137 mcg (0.1 %) nasal spray 1 spray (137 mcg total) by Nasal route 2 (two) times daily. 30 mL 0    bimatoprost (LATISSE) 0.03 % ophthalmic solution Place one drop on applicator and apply along the skin of the upper eyelid once daily at bedtime; repeat for second eye (use a clean applicator). 5 mL 1    celecoxib (CELEBREX) 100 MG capsule Take 1 capsule (100 mg total) by mouth daily as needed for Pain. (Patient not taking: Reported on 8/7/2023) 30 capsule 2    levothyroxine (SYNTHROID) 50 MCG tablet Take 1 tablet (50 mcg total) by mouth every morning. 90 tablet 3    mupirocin (BACTROBAN) 2 % ointment Apply topically 2 (two) times daily. 22 g 1    mupirocin calcium 2% (BACTROBAN) 2 % cream Apply topically 2 (two) times daily. (Patient not taking: Reported on 8/7/2023) 30 g 5    semaglutide (OZEMPIC) 1 mg/dose (4 mg/3 mL) Inject 1 mg into the skin every 7 days. 3 mL 2    tretinoin (RETIN-A) 0.05 % cream Apply topically every evening. 45 g 1    valACYclovir (VALTREX) 1000 MG tablet Take 2 tablets (2,000 mg total) by mouth 2 (two) times daily. for 1 day 30 tablet 1     No current facility-administered medications on file prior to visit.       Allergies:   Review of patient's allergies indicates:   Allergen Reactions    Povidone-iodine Hives     Other reaction(s): Unknown    Sulfa (sulfonamide antibiotics)      Pharmacy notified us       Social History:   Social History     Socioeconomic History    Marital status:    Occupational History     Employer: [employer]   Tobacco Use    Smoking status: Never    Smokeless tobacco: Never   Substance and Sexual Activity    Alcohol use: Yes    Drug use: No       PHYSICAL EXAMINATION:   Vitals:    10/03/23 1510   BP: 125/69   BP Location: Left arm   Pulse: 104   Height: 5' 9" (1.753 m)       Constitutional: No apparent distress. Pleasant.  HENT: Trachea " midline.  Head: Normocephalic and atraumatic.   Eyes: Right eye exhibits no discharge. Left eye exhibits no discharge. No scleral icterus.   CV: Well perfused.   Pulmonary/Chest: Effort normal. No respiratory distress.   Abdominal: There is no guarding.   Neurological: Awake, alert and cooperative.  SKIN: Intact no apparent lesions, cut, ulcers or abrasions  EXT:  No cyanosis, clubbing, or edema.  SENSORY: Intact to light touch in the bilateral lower extemities.  MUSCKULOSKELETAL:   Muscle Strength:(0-5)                             Right     Left  Hip Flexors                5  5  Hip Abductor              5  5  Hip Adductor              5  5  Knee Extensors          5  5  Knee Flexors              5  5  Ankle Dorsiflex           5  5  Ankle Planterflex        5  5  EHL                            5  5    Reflexes: (0-4+/4)   Right     Left  Patellar(L4)  2+  2+  Ankle(S1)  2+  2+       INSPECTION:                                                                           Right                Left        Localized/Generalized swelling:                     -                       -  Muscle contours normal and symmetrical:     +                      +  Patellas symetrical and level:                         +                      +  Ecchymoses:                                                   -                       -  Erythema:                                                        -                       -  Gross deformity:                                             -                       -     KNEE DEFORMITY:            Right                Left  Normal:                       +                      +  Genu varus:                -                       -  Genu valgum:             -                       -  Genu Recervatum:     -                       -     RANGE OF MOTION:           Right                Left  Flexion:                       Full                  Full        Extension:                   Full                   Full  Other:      TENDERNESS:                        Right                Left  Medial Tibial Plateau:             -                       -  Lateral Tibial Plateau:             -                       -  Medial Femoral Condyle:        -                       -  Lateral Femoral Condyle:       -                       -  Head of the Fibula:                 -                       -  Medial joint line:                      +                       -  Lateral joint line:                      -                       -  Patella:                                    +                      +  Medial collateral lig:                -                       -  Lateral collateral lig:                -                       -  Pes Anserine:                         -                       -     SOFT TISSUE PALPATION:                                       Right                Left  Baker's cyst:                -                       -             Effusion:                      -                       -  Ballottement:               -                       -  Other:                          -                       -      JOINT STABILITY:           Right                Left  Medial collateral lig:    -                       -  Lateral collateral lig:    -                      -  Anterior draw sign:      -                      -  Posterior draw sign:    -                       -  Lachmans:                  -                       -     SPECIAL TESTS:                   Right                Left  Kaitlin Test:                       -                       -  Patella  Grinding Test:            +                       +  Knee Joint Effusion Test:        -                       -     Limited examination of the left hip with noted tenderness to great troch region without palpable mass or nodule.     Imaging  XR knee from 06/14/2017 with right greater than left tricompartmental osteoarthritis with medial greater than lateral space  narrowing, Kellgren Sonido grade 1    Data Reviewed: X-ray    Supportive Actions: Independent visualization of images or test specimens.    ASSESSMENT:   1. Primary osteoarthritis of both knees    2. Chronic pain of both knees    3. Tendinopathy of left gluteus medius          PLAN:   1. Time was spent reviewing the above diagnosis in depth with Opal today, including acute management and rehabilitation.     2. Examination today most consistent with chronic intra-articular bilateral knee pain with underlying patellofemoral syndrome. We discussed options for management of her pain would be to consider injection of either steroid, hyaluronic acid, or platelet rich plasma (PRP). The use, benefits, risks, and expectations of all of these types of injections was discussed at length with her today. At this time, she elects to proceed with bilateral ultrasound-guided intra-articular knee hyaluronic acid injections. This procedure was completed today in clinic. Please see procedure note for further details.  We can repeat this every 6 months if appropriate.    3. With regards to her left hip pain, examination most consistent with left greater trochanteric bursa versus gluteal tendinopathy. We discussed options for management of her pain would be to consider injection of steroid. The use, benefits, risks, and expectations of all of these types of injections was discussed at length with her today. At this time, she elects to proceed with ultrasound-guided left greater trochanter bursa/glute tendon steroid injection. This procedure was completed today in clinic. Please see procedure note for further details.  We can repeat this every 3 months if appropriate.    4. May consider need for formal PT for underlying bilateral patellofemoral syndrome with aquatic therapy.     RTC as needed.     30 minutes of total time spent on the encounter, which includes face to face time and non-face to face time preparing to see the patient  (eg, review of tests), obtaining and/or reviewing separately obtained history, documenting clinical information in the electronic or other health record, independently interpreting results (not separately reported), communicating results to the patient/family/caregiver, and/or care coordination (not separately reported).

## 2023-12-06 DIAGNOSIS — Z78.0 MENOPAUSE: ICD-10-CM

## 2023-12-07 ENCOUNTER — TELEPHONE (OUTPATIENT)
Dept: FAMILY MEDICINE | Facility: CLINIC | Age: 73
End: 2023-12-07
Payer: COMMERCIAL

## 2023-12-07 NOTE — TELEPHONE ENCOUNTER
----- Message from Lupe Maldonado sent at 12/6/2023  4:16 PM CST -----  Regarding: pt needing apt  Pt is requesting a call about getting a sooner apt. She is a  and trying to get seen during [facility] break 12/26-01/08 if someone could give her a call back about getting this scheduled please.     Pt call back # [phone]    Thank you!

## 2023-12-12 ENCOUNTER — TELEPHONE (OUTPATIENT)
Dept: FAMILY MEDICINE | Facility: CLINIC | Age: 73
End: 2023-12-12
Payer: COMMERCIAL

## 2023-12-12 NOTE — TELEPHONE ENCOUNTER
----- Message from Fauzia Yao sent at 12/11/2023  2:02 PM CST -----  Contact: self  Type:  Patient Returning Call    Who Called:  pt  Who Left Message for Patient:  vijay  Does the patient know what this is regarding?:  yes  Best Call Back Number:  [phone]   Additional Information:  please call

## 2023-12-12 NOTE — TELEPHONE ENCOUNTER
----- Message from Susu Pacheco sent at 12/12/2023  1:46 PM CST -----  Type:  Patient Returning Call    Who Called:  patient  Who Left Message for Patient:  vijay  Does the patient know what this is regarding?:    Best Call Back Number:  [phone] (home)   Additional Information:

## 2023-12-12 NOTE — TELEPHONE ENCOUNTER
----- Message from Fauzia Yao sent at 12/11/2023  2:02 PM CST -----  Contact: self  Type:  Patient Returning Call    Who Called:  pt  Who Left Message for Patient:  vijay  Does the patient know what this is regarding?:  yes  Best Call Back Number:  [phone]   Additional Information:  please call

## 2024-01-02 ENCOUNTER — LAB VISIT (OUTPATIENT)
Dept: LAB | Facility: HOSPITAL | Age: 74
End: 2024-01-02
Attending: FAMILY MEDICINE
Payer: COMMERCIAL

## 2024-01-02 DIAGNOSIS — E78.00 HYPERCHOLESTEREMIA: Chronic | ICD-10-CM

## 2024-01-02 DIAGNOSIS — E03.9 HYPOTHYROIDISM, UNSPECIFIED TYPE: Chronic | ICD-10-CM

## 2024-01-02 LAB
ALBUMIN SERPL BCP-MCNC: 3.6 G/DL (ref 3.5–5.2)
ALP SERPL-CCNC: 59 U/L (ref 55–135)
ALT SERPL W/O P-5'-P-CCNC: 16 U/L (ref 10–44)
ANION GAP SERPL CALC-SCNC: 11 MMOL/L (ref 8–16)
AST SERPL-CCNC: 13 U/L (ref 10–40)
BILIRUB SERPL-MCNC: 1.3 MG/DL (ref 0.1–1)
BUN SERPL-MCNC: 16 MG/DL (ref 8–23)
CALCIUM SERPL-MCNC: 9.1 MG/DL (ref 8.7–10.5)
CHLORIDE SERPL-SCNC: 106 MMOL/L (ref 95–110)
CHOLEST SERPL-MCNC: 227 MG/DL (ref 120–199)
CHOLEST/HDLC SERPL: 3.8 {RATIO} (ref 2–5)
CO2 SERPL-SCNC: 23 MMOL/L (ref 23–29)
CREAT SERPL-MCNC: 0.7 MG/DL (ref 0.5–1.4)
ERYTHROCYTE [DISTWIDTH] IN BLOOD BY AUTOMATED COUNT: 12.7 % (ref 11.5–14.5)
EST. GFR  (NO RACE VARIABLE): >60 ML/MIN/1.73 M^2
GLUCOSE SERPL-MCNC: 104 MG/DL (ref 70–110)
HCT VFR BLD AUTO: 41.2 % (ref 37–48.5)
HDLC SERPL-MCNC: 60 MG/DL (ref 40–75)
HDLC SERPL: 26.4 % (ref 20–50)
HGB BLD-MCNC: 13.6 G/DL (ref 12–16)
LDLC SERPL CALC-MCNC: 131.6 MG/DL (ref 63–159)
MCH RBC QN AUTO: 30.2 PG (ref 27–31)
MCHC RBC AUTO-ENTMCNC: 33 G/DL (ref 32–36)
MCV RBC AUTO: 91 FL (ref 82–98)
NONHDLC SERPL-MCNC: 167 MG/DL
PLATELET # BLD AUTO: 419 K/UL (ref 150–450)
PMV BLD AUTO: 9.5 FL (ref 9.2–12.9)
POTASSIUM SERPL-SCNC: 4.2 MMOL/L (ref 3.5–5.1)
PROT SERPL-MCNC: 6.5 G/DL (ref 6–8.4)
RBC # BLD AUTO: 4.51 M/UL (ref 4–5.4)
SODIUM SERPL-SCNC: 140 MMOL/L (ref 136–145)
TRIGL SERPL-MCNC: 177 MG/DL (ref 30–150)
TSH SERPL DL<=0.005 MIU/L-ACNC: 0.93 UIU/ML (ref 0.4–4)
WBC # BLD AUTO: 7.23 K/UL (ref 3.9–12.7)

## 2024-01-02 PROCEDURE — 84443 ASSAY THYROID STIM HORMONE: CPT | Performed by: FAMILY MEDICINE

## 2024-01-02 PROCEDURE — 80061 LIPID PANEL: CPT | Performed by: FAMILY MEDICINE

## 2024-01-02 PROCEDURE — 80053 COMPREHEN METABOLIC PANEL: CPT | Performed by: FAMILY MEDICINE

## 2024-01-02 PROCEDURE — 85027 COMPLETE CBC AUTOMATED: CPT | Performed by: FAMILY MEDICINE

## 2024-01-02 PROCEDURE — 36415 COLL VENOUS BLD VENIPUNCTURE: CPT | Mod: PN | Performed by: FAMILY MEDICINE

## 2024-01-04 ENCOUNTER — OFFICE VISIT (OUTPATIENT)
Dept: FAMILY MEDICINE | Facility: CLINIC | Age: 74
End: 2024-01-04
Payer: COMMERCIAL

## 2024-01-04 VITALS — SYSTOLIC BLOOD PRESSURE: 128 MMHG | DIASTOLIC BLOOD PRESSURE: 82 MMHG | OXYGEN SATURATION: 98 % | HEART RATE: 95 BPM

## 2024-01-04 DIAGNOSIS — E03.9 HYPOTHYROIDISM, UNSPECIFIED TYPE: Chronic | ICD-10-CM

## 2024-01-04 DIAGNOSIS — Z23 NEED FOR VACCINATION: ICD-10-CM

## 2024-01-04 DIAGNOSIS — E78.00 HYPERCHOLESTEREMIA: Chronic | ICD-10-CM

## 2024-01-04 DIAGNOSIS — Z00.00 WELLNESS EXAMINATION: Primary | ICD-10-CM

## 2024-01-04 PROCEDURE — 1159F MED LIST DOCD IN RCRD: CPT | Mod: CPTII,S$GLB,, | Performed by: FAMILY MEDICINE

## 2024-01-04 PROCEDURE — 3079F DIAST BP 80-89 MM HG: CPT | Mod: CPTII,S$GLB,, | Performed by: FAMILY MEDICINE

## 2024-01-04 PROCEDURE — 99397 PER PM REEVAL EST PAT 65+ YR: CPT | Mod: 25,S$GLB,, | Performed by: FAMILY MEDICINE

## 2024-01-04 PROCEDURE — 99999 PR PBB SHADOW E&M-EST. PATIENT-LVL III: CPT | Mod: PBBFAC,,, | Performed by: FAMILY MEDICINE

## 2024-01-04 PROCEDURE — 1101F PT FALLS ASSESS-DOCD LE1/YR: CPT | Mod: CPTII,S$GLB,, | Performed by: FAMILY MEDICINE

## 2024-01-04 PROCEDURE — 3288F FALL RISK ASSESSMENT DOCD: CPT | Mod: CPTII,S$GLB,, | Performed by: FAMILY MEDICINE

## 2024-01-04 PROCEDURE — 90471 IMMUNIZATION ADMIN: CPT | Mod: S$GLB,,, | Performed by: FAMILY MEDICINE

## 2024-01-04 PROCEDURE — 90694 VACC AIIV4 NO PRSRV 0.5ML IM: CPT | Mod: S$GLB,,, | Performed by: FAMILY MEDICINE

## 2024-01-04 PROCEDURE — 1126F AMNT PAIN NOTED NONE PRSNT: CPT | Mod: CPTII,S$GLB,, | Performed by: FAMILY MEDICINE

## 2024-01-04 PROCEDURE — 3074F SYST BP LT 130 MM HG: CPT | Mod: CPTII,S$GLB,, | Performed by: FAMILY MEDICINE

## 2024-01-04 RX ORDER — FLUTICASONE PROPIONATE 50 MCG
SPRAY, SUSPENSION (ML) NASAL
COMMUNITY

## 2024-01-04 NOTE — PROGRESS NOTES
Subjective:       Patient ID: Opal Sahu is a 73 y.o. female.    Chief Complaint: Hyperlipidemia    Here for wellness and follow up multiple chronic medical issues. Doing well overall and in normal state of health.      Hyperlipidemia  Pertinent negatives include no chest pain or shortness of breath.     Review of Systems   Constitutional:  Negative for chills and fever.   Respiratory:  Negative for cough, chest tightness and shortness of breath.    Cardiovascular:  Negative for chest pain, palpitations and leg swelling.   Endocrine: Negative for cold intolerance and heat intolerance.   Psychiatric/Behavioral:  Negative for decreased concentration. The patient is not nervous/anxious.        Objective:      Physical Exam  Vitals and nursing note reviewed.   Constitutional:       Appearance: She is well-developed.   HENT:      Head: Normocephalic and atraumatic.   Cardiovascular:      Rate and Rhythm: Normal rate and regular rhythm.      Heart sounds: Normal heart sounds.   Pulmonary:      Effort: Pulmonary effort is normal.      Breath sounds: Normal breath sounds.   Psychiatric:         Mood and Affect: Mood normal.         Behavior: Behavior normal.         Assessment:       1. Wellness examination    2. Hypercholesteremia    3. Hypothyroidism, unspecified type        Plan:       Wellness examination    Hypercholesteremia  -     CBC Without Differential; Future; Expected date: 01/04/2024  -     Comprehensive Metabolic Panel; Future; Expected date: 01/04/2024  -     Lipid Panel; Future; Expected date: 01/04/2024  -     TSH; Future; Expected date: 01/04/2024    Hypothyroidism, unspecified type  -     CBC Without Differential; Future; Expected date: 01/04/2024  -     Comprehensive Metabolic Panel; Future; Expected date: 01/04/2024  -     Lipid Panel; Future; Expected date: 01/04/2024  -     TSH; Future; Expected date: 01/04/2024    Other orders  -     tirzepatide, weight loss, 2.5 mg/0.5 mL PnIj; Inject 2.5 mg  into the skin every 7 days.  Dispense: 1 Pen; Refill: 2      Trial of med for wt loss  Lipid stable  Hypothyroid stable  Diet/exercise  Will monitor chronic medical issues and continue current plan of care.    Follow up in about 6 months (around 7/4/2024), or if symptoms worsen or fail to improve.

## 2024-01-09 ENCOUNTER — PATIENT MESSAGE (OUTPATIENT)
Dept: FAMILY MEDICINE | Facility: CLINIC | Age: 74
End: 2024-01-09
Payer: COMMERCIAL

## 2024-01-09 DIAGNOSIS — L01.00 IMPETIGO: Primary | ICD-10-CM

## 2024-01-12 ENCOUNTER — DOCUMENTATION ONLY (OUTPATIENT)
Dept: FAMILY MEDICINE | Facility: CLINIC | Age: 74
End: 2024-01-12
Payer: COMMERCIAL

## 2024-01-25 ENCOUNTER — TELEPHONE (OUTPATIENT)
Dept: FAMILY MEDICINE | Facility: CLINIC | Age: 74
End: 2024-01-25
Payer: COMMERCIAL

## 2024-01-25 NOTE — TELEPHONE ENCOUNTER
----- Message from Joya Oliver sent at 1/25/2024  1:40 PM CST -----  Contact: Self  Type: Needs Medical Advice    Who Called:  Patient  What is this regarding?:  She needs to ask about a request for her Mounjaro. Her insurance denied it. She wants to see about the blood sugar and say why she needs it.  Best Call Back Number:  [phone]  Additional Information:  Please call the patient back at the phone number listed above to advise. Thank you!

## 2024-01-29 ENCOUNTER — TELEPHONE (OUTPATIENT)
Dept: FAMILY MEDICINE | Facility: CLINIC | Age: 74
End: 2024-01-29
Payer: COMMERCIAL

## 2024-01-29 NOTE — TELEPHONE ENCOUNTER
----- Message from Ameena Padgett sent at 1/29/2024  4:21 PM CST -----  Regarding: ret call / phone tag  Contact: Opal [phone]  Type:  Patient Returning Call    Who Called:  Opal    Who Left Message for Patient:  Charla    Does the patient know what this is regarding?:      Best Call Back Number:  [phone]    Additional Information:  patient only has between 4:15 - 5pm when she can answer the phone so has been playing phone tag to try to speak to Charla, please see previous messages and call to advise. Today or Tomorrow

## 2024-01-29 NOTE — TELEPHONE ENCOUNTER
----- Message from Ameena Padgett sent at 1/29/2024  4:21 PM CST -----  Regarding: ret call / phone tag  Contact: Opal [phone]  Type:  Patient Returning Call    Who Called:  Opal    Who Left Message for Patient:  Charla    Does the patient know what this is regarding?:      Best Call Back Number:  [phone]    Additional Information:  patient only has between 4:15 - 5pm when she can answer the phone so has been playing phone tag to try to speak to Charla, please see previous messages and call to advise. Today or Tomorrow

## 2024-03-05 ENCOUNTER — TELEPHONE (OUTPATIENT)
Dept: FAMILY MEDICINE | Facility: CLINIC | Age: 74
End: 2024-03-05
Payer: COMMERCIAL

## 2024-03-05 NOTE — TELEPHONE ENCOUNTER
Historically no sugar elevations for her.  The computer may have showed coverage from what it was pulling from her insurance but that may be for diabetes only.  There are a few clinics in town offering the med compounded at a reduced cost.

## 2024-03-05 NOTE — TELEPHONE ENCOUNTER
----- Message from Chana Jean-Baptiste MA sent at 3/5/2024  1:39 PM CST -----  Contact: self  Type:  Patient Returning Call     Who Called:  Patient  Who Left Message for Patient:  Mt  Does the patient know what this is regarding?:  yes  Best Call Back Number:  [phone]     Additional Information:  Pt ret a call.Please call back

## 2024-03-07 NOTE — TELEPHONE ENCOUNTER
LVM for patient to call back related to the Monjaro and to see if she wants an earlier appointment.

## 2024-03-08 ENCOUNTER — TELEPHONE (OUTPATIENT)
Dept: FAMILY MEDICINE | Facility: CLINIC | Age: 74
End: 2024-03-08
Payer: COMMERCIAL

## 2024-03-08 NOTE — TELEPHONE ENCOUNTER
----- Message from Joaquin Malin sent at 3/6/2024  3:07 PM CST -----  Contact: pt  Type: Needs Medical Advice  Who Called:  pt  Best Call Back Number: [phone]    Additional Information: Pt is calling the office returning call to the nurse.Please call back and advise.

## 2024-03-12 NOTE — TELEPHONE ENCOUNTER
Ms. Sahu called back regarding the Monjaro.  Dr.. Bailey said that historically the sugar is not elevated.  I have scheduled her a appointment to discuss in April.

## 2024-03-12 NOTE — TELEPHONE ENCOUNTER
Ms. Joseph called requesting a refill of the Retin A and Latisse.  I do not see the Latisse in med card.    LOV 1/4/24  Last refill 8/2/23  Follow up in April

## 2024-03-15 RX ORDER — BIMATOPROST 3 UG/ML
SOLUTION TOPICAL
Qty: 5 ML | Refills: 1 | OUTPATIENT
Start: 2024-03-15

## 2024-03-15 RX ORDER — TRETINOIN 0.5 MG/G
CREAM TOPICAL NIGHTLY
Qty: 45 G | Refills: 1 | Status: SHIPPED | OUTPATIENT
Start: 2024-03-15 | End: 2024-04-17 | Stop reason: SDUPTHER

## 2024-03-15 RX ORDER — TRETINOIN 1 MG/G
CREAM TOPICAL
Qty: 45 G | Refills: 1 | OUTPATIENT
Start: 2024-03-15

## 2024-04-04 ENCOUNTER — OFFICE VISIT (OUTPATIENT)
Dept: PHYSICAL MEDICINE AND REHAB | Facility: CLINIC | Age: 74
End: 2024-04-04
Payer: COMMERCIAL

## 2024-04-04 VITALS — DIASTOLIC BLOOD PRESSURE: 79 MMHG | SYSTOLIC BLOOD PRESSURE: 164 MMHG | HEART RATE: 87 BPM

## 2024-04-04 DIAGNOSIS — M25.562 CHRONIC PAIN OF BOTH KNEES: Primary | ICD-10-CM

## 2024-04-04 DIAGNOSIS — G89.29 CHRONIC PAIN OF BOTH KNEES: Primary | ICD-10-CM

## 2024-04-04 DIAGNOSIS — M17.0 PRIMARY OSTEOARTHRITIS OF BOTH KNEES: ICD-10-CM

## 2024-04-04 DIAGNOSIS — M25.561 CHRONIC PAIN OF BOTH KNEES: Primary | ICD-10-CM

## 2024-04-04 DIAGNOSIS — M67.952 TENDINOPATHY OF LEFT GLUTEUS MEDIUS: ICD-10-CM

## 2024-04-04 PROCEDURE — 20611 DRAIN/INJ JOINT/BURSA W/US: CPT | Mod: 50,S$GLB,, | Performed by: PHYSICAL MEDICINE & REHABILITATION

## 2024-04-04 PROCEDURE — 20551 NJX 1 TENDON ORIGIN/INSJ: CPT | Mod: 59,S$GLB,, | Performed by: PHYSICAL MEDICINE & REHABILITATION

## 2024-04-04 PROCEDURE — 99999 PR PBB SHADOW E&M-EST. PATIENT-LVL III: CPT | Mod: PBBFAC,,, | Performed by: PHYSICAL MEDICINE & REHABILITATION

## 2024-04-04 PROCEDURE — 99499 UNLISTED E&M SERVICE: CPT | Mod: S$GLB,,, | Performed by: PHYSICAL MEDICINE & REHABILITATION

## 2024-04-04 RX ORDER — LIDOCAINE HYDROCHLORIDE 10 MG/ML
4 INJECTION INFILTRATION; PERINEURAL
Status: DISCONTINUED | OUTPATIENT
Start: 2024-04-04 | End: 2024-04-04 | Stop reason: HOSPADM

## 2024-04-04 RX ORDER — BETAMETHASONE SODIUM PHOSPHATE AND BETAMETHASONE ACETATE 3; 3 MG/ML; MG/ML
6 INJECTION, SUSPENSION INTRA-ARTICULAR; INTRALESIONAL; INTRAMUSCULAR; SOFT TISSUE
Status: DISCONTINUED | OUTPATIENT
Start: 2024-04-04 | End: 2024-04-04 | Stop reason: HOSPADM

## 2024-04-04 RX ADMIN — BETAMETHASONE SODIUM PHOSPHATE AND BETAMETHASONE ACETATE 6 MG: 3; 3 INJECTION, SUSPENSION INTRA-ARTICULAR; INTRALESIONAL; INTRAMUSCULAR; SOFT TISSUE at 03:04

## 2024-04-04 RX ADMIN — LIDOCAINE HYDROCHLORIDE 4 ML: 10 INJECTION INFILTRATION; PERINEURAL at 03:04

## 2024-04-04 NOTE — PROGRESS NOTES
[facility] PROCEDURE NOTE    CHIEF COMPLAINT:   Chief Complaint   Patient presents with    Knee Pain     Both     Hip Pain     Left        HISTORY OF PRESENT ILLNESS: Opal Sahu is a 73 y.o. female who presents to me for planned procedure/injection of hyaluronic acid and steroid for management of the below noted diagnosis.     Medications:   Current Outpatient Medications on File Prior to Visit   Medication Sig Dispense Refill    aspirin 81 mg Tab Every day      atorvastatin (LIPITOR) 10 MG tablet take 1 tablet by mouth once a day 90 tablet 3    fluticasone propionate (FLONASE ALLERGY RELIEF) 50 mcg/actuation nasal spray 1 spray in each nostril Nasally Once a day for 30 day(s)      levothyroxine (SYNTHROID) 50 MCG tablet Take 1 tablet (50 mcg total) by mouth every morning. 90 tablet 3    mupirocin (BACTROBAN) 2 % ointment Apply topically 2 (two) times daily. 22 g 1    tirzepatide, weight loss, 2.5 mg/0.5 mL PnIj Inject 2.5 mg into the skin every 7 days. 1 Pen 2    tretinoin (RETIN-A) 0.05 % cream Apply topically every evening. 45 g 1    valACYclovir (VALTREX) 1000 MG tablet Take 2 tablets (2,000 mg total) by mouth 2 (two) times daily. for 1 day 30 tablet 1     No current facility-administered medications on file prior to visit.       Allergies:   Review of patient's allergies indicates:   Allergen Reactions    Povidone-iodine Hives     Other reaction(s): Unknown    Sulfa (sulfonamide antibiotics)      Pharmacy notified us       Vitals:   Vitals:    04/04/24 1505   BP: (!) 164/79   Pulse: 87       ASSESSMENT:   1. Chronic pain of both knees    2. Primary osteoarthritis of both knees    3. Tendinopathy of left gluteus medius        PLAN:   1. Procedure/injection was completed for management of above diagnosis.    2. Please see procedure note from today's visit with further details.     RTC as needed. Okay to repeat HA injections every 6 months. Okay to repeat left  GTB steroid every 3 months.

## 2024-04-04 NOTE — PROCEDURES
Large Joint Aspiration/Injection: bilateral knee    Date/Time: 4/4/2024 3:00 PM    Performed by: Kristen Colmenares DO  Authorized by: Kristen Colmenares, DO    Consent Done?:  Yes (Verbal)  Indications:  Arthritis, diagnostic evaluation and pain  Site marked: the procedure site was marked    Timeout: prior to procedure the correct patient, procedure, and site was verified    Prep: patient was prepped and draped in usual sterile fashion    Local anesthesia used?: No (ethyl chloride spray)      Details:  Needle Size:  22 G  Ultrasonic Guidance for needle placement?: Yes    Images are saved and documented.  Approach:  Lateral  Location:  Knee  Laterality:  Bilateral  Site:  Bilateral knee  Medications (Right):  48 mg hylan g-f 20 48 mg/6 mL  Medications (Left):  48 mg hylan g-f 20 48 mg/6 mL  Patient tolerance:  Patient tolerated the procedure well with no immediate complications     Ultrasound guidance was used for correct needle placement, the images were saved will be uploaded to EMR.  Tendon Origin: left GTB    Date/Time: 4/4/2024 3:00 PM    Performed by: Kristen Colmenares,   Authorized by: Kristen Colmenares, DO    Consent Done?:  Yes (Verbal)  Timeout: prior to procedure the correct patient, procedure, and site was verified    Indications:  Diagnostic evaluation and pain  Site marked: the procedure site was marked    Timeout: prior to procedure the correct patient, procedure, and site was verified    Location:  Hip  Hip joint: Left GT bursa/glute tendon.  Prep: patient was prepped and draped in usual sterile fashion    Ultrasonic Guidance for Needle Placement?: Yes    Needle size:  22 G (spinal)  Approach:  Posterolateral  Medications:  4 mL LIDOcaine HCL 10 mg/ml (1%) 10 mg/mL (1 %); 6 mg betamethasone acetate-betamethasone sodium phosphate 6 mg/mL  Patient tolerance:  Patient tolerated the procedure well with no immediate complications   Ultrasound guidance was used for correct needle placement, the images were  saved will be uploaded to EMR.

## 2024-04-17 ENCOUNTER — TELEPHONE (OUTPATIENT)
Dept: FAMILY MEDICINE | Facility: CLINIC | Age: 74
End: 2024-04-17
Payer: COMMERCIAL

## 2024-04-17 ENCOUNTER — OFFICE VISIT (OUTPATIENT)
Dept: FAMILY MEDICINE | Facility: CLINIC | Age: 74
End: 2024-04-17
Payer: COMMERCIAL

## 2024-04-17 VITALS
HEART RATE: 92 BPM | WEIGHT: 149.94 LBS | SYSTOLIC BLOOD PRESSURE: 130 MMHG | HEIGHT: 69 IN | BODY MASS INDEX: 22.21 KG/M2 | DIASTOLIC BLOOD PRESSURE: 82 MMHG | OXYGEN SATURATION: 96 %

## 2024-04-17 DIAGNOSIS — H04.123 DRY EYES: Primary | ICD-10-CM

## 2024-04-17 DIAGNOSIS — Z86.39 HISTORY OF BEING OBESE: ICD-10-CM

## 2024-04-17 PROCEDURE — 1101F PT FALLS ASSESS-DOCD LE1/YR: CPT | Mod: CPTII,S$GLB,, | Performed by: FAMILY MEDICINE

## 2024-04-17 PROCEDURE — 1159F MED LIST DOCD IN RCRD: CPT | Mod: CPTII,S$GLB,, | Performed by: FAMILY MEDICINE

## 2024-04-17 PROCEDURE — 1126F AMNT PAIN NOTED NONE PRSNT: CPT | Mod: CPTII,S$GLB,, | Performed by: FAMILY MEDICINE

## 2024-04-17 PROCEDURE — 3075F SYST BP GE 130 - 139MM HG: CPT | Mod: CPTII,S$GLB,, | Performed by: FAMILY MEDICINE

## 2024-04-17 PROCEDURE — 99213 OFFICE O/P EST LOW 20 MIN: CPT | Mod: S$GLB,,, | Performed by: FAMILY MEDICINE

## 2024-04-17 PROCEDURE — 3288F FALL RISK ASSESSMENT DOCD: CPT | Mod: CPTII,S$GLB,, | Performed by: FAMILY MEDICINE

## 2024-04-17 PROCEDURE — 3008F BODY MASS INDEX DOCD: CPT | Mod: CPTII,S$GLB,, | Performed by: FAMILY MEDICINE

## 2024-04-17 PROCEDURE — 3079F DIAST BP 80-89 MM HG: CPT | Mod: CPTII,S$GLB,, | Performed by: FAMILY MEDICINE

## 2024-04-17 PROCEDURE — 99999 PR PBB SHADOW E&M-EST. PATIENT-LVL III: CPT | Mod: PBBFAC,,, | Performed by: FAMILY MEDICINE

## 2024-04-17 RX ORDER — SEMAGLUTIDE 0.25 MG/.5ML
0.25 INJECTION, SOLUTION SUBCUTANEOUS
Qty: 2 ML | Refills: 2 | Status: SHIPPED | OUTPATIENT
Start: 2024-04-17 | End: 2024-06-03

## 2024-04-17 RX ORDER — BIMATOPROST 3 UG/ML
SOLUTION TOPICAL
Qty: 5 ML | Refills: 5 | Status: SHIPPED | OUTPATIENT
Start: 2024-04-17 | End: 2024-06-03 | Stop reason: SDUPTHER

## 2024-04-17 RX ORDER — TRETINOIN 0.5 MG/G
CREAM TOPICAL NIGHTLY
Qty: 45 G | Refills: 1 | Status: SHIPPED | OUTPATIENT
Start: 2024-04-17 | End: 2024-06-03 | Stop reason: SDUPTHER

## 2024-04-17 NOTE — TELEPHONE ENCOUNTER
LVM for patient to the clinic to reschedule her appointment also talked with patient  and he will try to reach her and have her call the clinic.

## 2024-04-17 NOTE — PROGRESS NOTES
Subjective:       Patient ID: Opal Sahu is a 73 y.o. female.    Chief Complaint: Follow-up (3 month follow up. Discuss medication ( Ozempic, facial acne cream. )    Here for follow up multiple chronic medical issues. Doing well overall and in normal state of health.  Needs refill eye drops and retin cream.      Follow-up  Pertinent negatives include no chest pain, chills, coughing or fever.     Review of Systems   Constitutional:  Negative for chills and fever.   Respiratory:  Negative for cough, chest tightness and shortness of breath.    Cardiovascular:  Negative for chest pain, palpitations and leg swelling.   Endocrine: Negative for cold intolerance and heat intolerance.   Psychiatric/Behavioral:  Negative for decreased concentration. The patient is not nervous/anxious.        Objective:      Physical Exam  Vitals and nursing note reviewed.   Constitutional:       Appearance: She is well-developed.   HENT:      Head: Normocephalic and atraumatic.   Cardiovascular:      Rate and Rhythm: Normal rate and regular rhythm.      Heart sounds: Normal heart sounds.   Pulmonary:      Effort: Pulmonary effort is normal.      Breath sounds: Normal breath sounds.   Psychiatric:         Mood and Affect: Mood normal.         Behavior: Behavior normal.         Assessment:       1. Dry eyes    2. History of being obese        Plan:       Dry eyes  -     bimatoprost (LATISSE) 0.03 % ophthalmic solution; Place one drop on applicator and apply evenly along the skin of the upper eyelid at base of eyelashes once daily at bedtime; repeat procedure for second eye (use a clean applicator).  Dispense: 5 mL; Refill: 5    History of being obese  -     semaglutide, weight loss, (WEGOVY) 0.25 mg/0.5 mL PnIj; Inject 0.25 mg into the skin every 7 days.  Dispense: 2 mL; Refill: 2    Other orders  -     tretinoin (RETIN-A) 0.05 % cream; Apply topically every evening.  Dispense: 45 g; Refill: 1      Refill needed meds  Labs before f/u  Wt  loss clinic if med not covered    Follow up if symptoms worsen or fail to improve.

## 2024-04-17 NOTE — TELEPHONE ENCOUNTER
----- Message from John Mendoza sent at 4/17/2024 10:15 AM CDT -----  Regarding: advice  Contact: patient  Type: Needs Medical Advice  Who Called:  patient   Symptoms (please be specific):    How long has patient had these symptoms:    Pharmacy name and phone #:    Best Call Back Number: [phone]  Additional Information: pt stated that she would take the the appt today at 2pm. Please call to advice. Thanks

## 2024-05-14 ENCOUNTER — TELEPHONE (OUTPATIENT)
Dept: FAMILY MEDICINE | Facility: CLINIC | Age: 74
End: 2024-05-14
Payer: COMMERCIAL

## 2024-05-31 ENCOUNTER — LAB VISIT (OUTPATIENT)
Dept: LAB | Facility: HOSPITAL | Age: 74
End: 2024-05-31
Attending: FAMILY MEDICINE
Payer: COMMERCIAL

## 2024-05-31 DIAGNOSIS — E03.9 HYPOTHYROIDISM, UNSPECIFIED TYPE: Chronic | ICD-10-CM

## 2024-05-31 DIAGNOSIS — E78.00 HYPERCHOLESTEREMIA: Chronic | ICD-10-CM

## 2024-05-31 LAB
ALBUMIN SERPL BCP-MCNC: 3.7 G/DL (ref 3.5–5.2)
ALP SERPL-CCNC: 60 U/L (ref 55–135)
ALT SERPL W/O P-5'-P-CCNC: 17 U/L (ref 10–44)
ANION GAP SERPL CALC-SCNC: 9 MMOL/L (ref 8–16)
AST SERPL-CCNC: 14 U/L (ref 10–40)
BILIRUB SERPL-MCNC: 1 MG/DL (ref 0.1–1)
BUN SERPL-MCNC: 14 MG/DL (ref 8–23)
CALCIUM SERPL-MCNC: 9.4 MG/DL (ref 8.7–10.5)
CHLORIDE SERPL-SCNC: 108 MMOL/L (ref 95–110)
CHOLEST SERPL-MCNC: 215 MG/DL (ref 120–199)
CHOLEST/HDLC SERPL: 3.8 {RATIO} (ref 2–5)
CO2 SERPL-SCNC: 23 MMOL/L (ref 23–29)
CREAT SERPL-MCNC: 0.8 MG/DL (ref 0.5–1.4)
ERYTHROCYTE [DISTWIDTH] IN BLOOD BY AUTOMATED COUNT: 13.2 % (ref 11.5–14.5)
EST. GFR  (NO RACE VARIABLE): >60 ML/MIN/1.73 M^2
GLUCOSE SERPL-MCNC: 104 MG/DL (ref 70–110)
HCT VFR BLD AUTO: 41.1 % (ref 37–48.5)
HDLC SERPL-MCNC: 56 MG/DL (ref 40–75)
HDLC SERPL: 26 % (ref 20–50)
HGB BLD-MCNC: 13.5 G/DL (ref 12–16)
LDLC SERPL CALC-MCNC: 127.2 MG/DL (ref 63–159)
MCH RBC QN AUTO: 29.9 PG (ref 27–31)
MCHC RBC AUTO-ENTMCNC: 32.8 G/DL (ref 32–36)
MCV RBC AUTO: 91 FL (ref 82–98)
NONHDLC SERPL-MCNC: 159 MG/DL
PLATELET # BLD AUTO: 401 K/UL (ref 150–450)
PMV BLD AUTO: 9.5 FL (ref 9.2–12.9)
POTASSIUM SERPL-SCNC: 4.3 MMOL/L (ref 3.5–5.1)
PROT SERPL-MCNC: 6.5 G/DL (ref 6–8.4)
RBC # BLD AUTO: 4.52 M/UL (ref 4–5.4)
SODIUM SERPL-SCNC: 140 MMOL/L (ref 136–145)
TRIGL SERPL-MCNC: 159 MG/DL (ref 30–150)
TSH SERPL DL<=0.005 MIU/L-ACNC: 0.8 UIU/ML (ref 0.4–4)
WBC # BLD AUTO: 7.62 K/UL (ref 3.9–12.7)

## 2024-05-31 PROCEDURE — 85027 COMPLETE CBC AUTOMATED: CPT | Performed by: FAMILY MEDICINE

## 2024-05-31 PROCEDURE — 36415 COLL VENOUS BLD VENIPUNCTURE: CPT | Mod: PN | Performed by: FAMILY MEDICINE

## 2024-05-31 PROCEDURE — 80053 COMPREHEN METABOLIC PANEL: CPT | Performed by: FAMILY MEDICINE

## 2024-05-31 PROCEDURE — 84443 ASSAY THYROID STIM HORMONE: CPT | Performed by: FAMILY MEDICINE

## 2024-05-31 PROCEDURE — 80061 LIPID PANEL: CPT | Performed by: FAMILY MEDICINE

## 2024-06-03 ENCOUNTER — OFFICE VISIT (OUTPATIENT)
Dept: FAMILY MEDICINE | Facility: CLINIC | Age: 74
End: 2024-06-03
Attending: FAMILY MEDICINE
Payer: COMMERCIAL

## 2024-06-03 ENCOUNTER — HOSPITAL ENCOUNTER (OUTPATIENT)
Dept: RADIOLOGY | Facility: HOSPITAL | Age: 74
Discharge: HOME | End: 2024-06-03
Attending: FAMILY MEDICINE
Payer: COMMERCIAL

## 2024-06-03 VITALS
SYSTOLIC BLOOD PRESSURE: 118 MMHG | HEART RATE: 91 BPM | BODY MASS INDEX: 24.1 KG/M2 | DIASTOLIC BLOOD PRESSURE: 78 MMHG | WEIGHT: 149.94 LBS | HEIGHT: 66 IN | OXYGEN SATURATION: 96 %

## 2024-06-03 DIAGNOSIS — Z12.39 ENCOUNTER FOR SCREENING FOR MALIGNANT NEOPLASM OF BREAST, UNSPECIFIED SCREENING MODALITY: Primary | ICD-10-CM

## 2024-06-03 DIAGNOSIS — Z78.0 MENOPAUSE: ICD-10-CM

## 2024-06-03 DIAGNOSIS — E04.1 THYROID NODULE: ICD-10-CM

## 2024-06-03 DIAGNOSIS — H04.123 DRY EYES: ICD-10-CM

## 2024-06-03 DIAGNOSIS — E03.4 HYPOTHYROIDISM DUE TO ACQUIRED ATROPHY OF THYROID: Chronic | ICD-10-CM

## 2024-06-03 DIAGNOSIS — E78.00 HYPERCHOLESTEREMIA: ICD-10-CM

## 2024-06-03 DIAGNOSIS — L72.0 EPIDERMAL CYST: ICD-10-CM

## 2024-06-03 PROCEDURE — 1159F MED LIST DOCD IN RCRD: CPT | Mod: CPTII,S$GLB,, | Performed by: FAMILY MEDICINE

## 2024-06-03 PROCEDURE — 99214 OFFICE O/P EST MOD 30 MIN: CPT | Mod: S$GLB,,, | Performed by: FAMILY MEDICINE

## 2024-06-03 PROCEDURE — 1101F PT FALLS ASSESS-DOCD LE1/YR: CPT | Mod: CPTII,S$GLB,, | Performed by: FAMILY MEDICINE

## 2024-06-03 PROCEDURE — 1126F AMNT PAIN NOTED NONE PRSNT: CPT | Mod: CPTII,S$GLB,, | Performed by: FAMILY MEDICINE

## 2024-06-03 PROCEDURE — 3078F DIAST BP <80 MM HG: CPT | Mod: CPTII,S$GLB,, | Performed by: FAMILY MEDICINE

## 2024-06-03 PROCEDURE — G2211 COMPLEX E/M VISIT ADD ON: HCPCS | Mod: S$GLB,,, | Performed by: FAMILY MEDICINE

## 2024-06-03 PROCEDURE — 77080 DXA BONE DENSITY AXIAL: CPT | Mod: 26,,, | Performed by: RADIOLOGY

## 2024-06-03 PROCEDURE — 3074F SYST BP LT 130 MM HG: CPT | Mod: CPTII,S$GLB,, | Performed by: FAMILY MEDICINE

## 2024-06-03 PROCEDURE — 77080 DXA BONE DENSITY AXIAL: CPT | Mod: TC,PO

## 2024-06-03 PROCEDURE — 3008F BODY MASS INDEX DOCD: CPT | Mod: CPTII,S$GLB,, | Performed by: FAMILY MEDICINE

## 2024-06-03 PROCEDURE — 3288F FALL RISK ASSESSMENT DOCD: CPT | Mod: CPTII,S$GLB,, | Performed by: FAMILY MEDICINE

## 2024-06-03 PROCEDURE — 99999 PR PBB SHADOW E&M-EST. PATIENT-LVL III: CPT | Mod: PBBFAC,,, | Performed by: FAMILY MEDICINE

## 2024-06-03 RX ORDER — BIMATOPROST 3 UG/ML
SOLUTION TOPICAL
Qty: 5 ML | Refills: 5 | Status: SHIPPED | OUTPATIENT
Start: 2024-06-03

## 2024-06-03 RX ORDER — MUPIROCIN 20 MG/G
OINTMENT TOPICAL 2 TIMES DAILY PRN
Qty: 22 G | Refills: 1 | Status: SHIPPED | OUTPATIENT
Start: 2024-06-03

## 2024-06-03 RX ORDER — TRETINOIN 0.5 MG/G
CREAM TOPICAL NIGHTLY
Qty: 45 G | Refills: 1 | Status: SHIPPED | OUTPATIENT
Start: 2024-06-03

## 2024-06-03 RX ORDER — ATORVASTATIN CALCIUM 10 MG/1
10 TABLET, FILM COATED ORAL NIGHTLY
Qty: 90 TABLET | Refills: 3 | Status: SHIPPED | OUTPATIENT
Start: 2024-06-03

## 2024-06-03 RX ORDER — LEVOTHYROXINE SODIUM 50 UG/1
50 TABLET ORAL EVERY MORNING
Qty: 90 TABLET | Refills: 3 | Status: SHIPPED | OUTPATIENT
Start: 2024-06-03

## 2024-06-03 NOTE — PROGRESS NOTES
Subjective:       Patient ID: Opal Sahu is a 73 y.o. female.    Chief Complaint: Annual Exam    Here for follow up multiple chronic medical issues. Doing well overall and in normal state of health.        Review of Systems   Constitutional:  Negative for chills and fever.   Respiratory:  Negative for cough, chest tightness and shortness of breath.    Cardiovascular:  Negative for chest pain, palpitations and leg swelling.   Endocrine: Negative for cold intolerance and heat intolerance.   Psychiatric/Behavioral:  Negative for decreased concentration. The patient is not nervous/anxious.        Objective:      Physical Exam  Vitals and nursing note reviewed.   Constitutional:       Appearance: She is well-developed.   HENT:      Head: Normocephalic and atraumatic.   Cardiovascular:      Rate and Rhythm: Normal rate and regular rhythm.      Heart sounds: Normal heart sounds.   Pulmonary:      Effort: Pulmonary effort is normal.      Breath sounds: Normal breath sounds.   Psychiatric:         Mood and Affect: Mood normal.         Behavior: Behavior normal.         Assessment:       1. Encounter for screening for malignant neoplasm of breast, unspecified screening modality    2. Hypercholesteremia    3. Dry eyes    4. Hypothyroidism due to acquired atrophy of thyroid    5. Epidermal cyst    6. Thyroid nodule        Plan:       Encounter for screening for malignant neoplasm of breast, unspecified screening modality  -     Mammo Digital Screening Bilat w/ Alfred; Future; Expected date: 06/03/2024    Hypercholesteremia  -     atorvastatin (LIPITOR) 10 MG tablet; Take 1 tablet (10 mg total) by mouth every evening.  Dispense: 90 tablet; Refill: 3  -     Comprehensive Metabolic Panel; Future; Expected date: 06/03/2024  -     CBC Without Differential; Future; Expected date: 06/03/2024  -     Lipid Panel; Future; Expected date: 06/03/2024  -     TSH; Future; Expected date: 06/03/2024    Dry eyes  -     bimatoprost (LATISSE)  0.03 % ophthalmic solution; Place one drop on applicator and apply evenly along the skin of the upper eyelid at base of eyelashes once daily at bedtime; repeat procedure for second eye (use a clean applicator).  Dispense: 5 mL; Refill: 5    Hypothyroidism due to acquired atrophy of thyroid  -     levothyroxine (SYNTHROID) 50 MCG tablet; Take 1 tablet (50 mcg total) by mouth every morning.  Dispense: 90 tablet; Refill: 3  -     Comprehensive Metabolic Panel; Future; Expected date: 06/03/2024  -     CBC Without Differential; Future; Expected date: 06/03/2024  -     Lipid Panel; Future; Expected date: 06/03/2024  -     TSH; Future; Expected date: 06/03/2024    Epidermal cyst  -     mupirocin (BACTROBAN) 2 % ointment; Apply topically 2 (two) times daily as needed.  Dispense: 22 g; Refill: 1    Thyroid nodule  -     US Soft Tissue Head Neck; Future; Expected date: 06/03/2024    Other orders  -     tretinoin (RETIN-A) 0.05 % cream; Apply topically every evening.  Dispense: 45 g; Refill: 1      Vit d and calcium for bone health  Lipid stable  Hypothyroid stable  Will monitor chronic medical issues and continue current plan of care.  Visit today included increased complexity associated with the care of the episodic problem lipid addressed and managing the longitudinal care of the patient due to the serious and/or complex managed problem(s) as above.      Follow up in about 6 months (around 12/3/2024), or if symptoms worsen or fail to improve.

## 2024-06-19 ENCOUNTER — HOSPITAL ENCOUNTER (OUTPATIENT)
Dept: RADIOLOGY | Facility: HOSPITAL | Age: 74
Discharge: HOME | End: 2024-06-19
Attending: FAMILY MEDICINE
Payer: COMMERCIAL

## 2024-06-19 DIAGNOSIS — E04.1 THYROID NODULE: ICD-10-CM

## 2024-06-19 PROCEDURE — 76536 US EXAM OF HEAD AND NECK: CPT | Mod: TC,PO

## 2024-06-19 PROCEDURE — 76536 US EXAM OF HEAD AND NECK: CPT | Mod: 26,,, | Performed by: RADIOLOGY

## 2024-07-12 NOTE — TELEPHONE ENCOUNTER
----- Message from Jose Juan Flores sent at 3/2/2018  2:42 PM CST -----  Contact: pt  ..  [id number] - [place] - [address]  [address] [facility] [address]  [place] [address]  Phone: [phone] Fax: [phone]  Pt is requesting a refill on her SYNTHROID 50 mcg tablet  Call Back#[phone]  Thanks      [id number] - [place] - [address]  [address]  [place] [address]  Phone: [phone] Fax: [phone]       Patient notified of results and was reminded of upcoming appointment. Seen by patient Claire Escobedo on 7/11/2024 11:40 PM

## 2024-09-03 ENCOUNTER — HOSPITAL ENCOUNTER (OUTPATIENT)
Dept: RADIOLOGY | Facility: HOSPITAL | Age: 74
Discharge: HOME | End: 2024-09-03
Attending: FAMILY MEDICINE
Payer: COMMERCIAL

## 2024-09-03 DIAGNOSIS — Z12.39 ENCOUNTER FOR SCREENING FOR MALIGNANT NEOPLASM OF BREAST, UNSPECIFIED SCREENING MODALITY: ICD-10-CM

## 2024-09-03 PROCEDURE — 77063 BREAST TOMOSYNTHESIS BI: CPT | Mod: 26,,, | Performed by: RADIOLOGY

## 2024-09-03 PROCEDURE — 77067 SCR MAMMO BI INCL CAD: CPT | Mod: 26,,, | Performed by: RADIOLOGY

## 2024-09-03 PROCEDURE — 77067 SCR MAMMO BI INCL CAD: CPT | Mod: TC,PO

## 2024-10-02 DIAGNOSIS — M17.0 PRIMARY OSTEOARTHRITIS OF BOTH KNEES: Primary | ICD-10-CM

## 2024-10-09 ENCOUNTER — OFFICE VISIT (OUTPATIENT)
Dept: PHYSICAL MEDICINE AND REHAB | Facility: CLINIC | Age: 74
End: 2024-10-09
Payer: COMMERCIAL

## 2024-10-09 VITALS — DIASTOLIC BLOOD PRESSURE: 70 MMHG | SYSTOLIC BLOOD PRESSURE: 150 MMHG | HEART RATE: 89 BPM

## 2024-10-09 DIAGNOSIS — M17.0 PRIMARY OSTEOARTHRITIS OF BOTH KNEES: ICD-10-CM

## 2024-10-09 DIAGNOSIS — M25.562 CHRONIC PAIN OF BOTH KNEES: Primary | ICD-10-CM

## 2024-10-09 DIAGNOSIS — G89.29 CHRONIC PAIN OF BOTH KNEES: Primary | ICD-10-CM

## 2024-10-09 DIAGNOSIS — M25.561 CHRONIC PAIN OF BOTH KNEES: Primary | ICD-10-CM

## 2024-10-09 DIAGNOSIS — M67.952 TENDINOPATHY OF LEFT GLUTEUS MEDIUS: ICD-10-CM

## 2024-10-09 PROCEDURE — 99999 PR PBB SHADOW E&M-EST. PATIENT-LVL III: CPT | Mod: PBBFAC,,, | Performed by: PHYSICAL MEDICINE & REHABILITATION

## 2024-10-09 RX ORDER — LIDOCAINE HYDROCHLORIDE 10 MG/ML
4 INJECTION, SOLUTION INFILTRATION; PERINEURAL
Status: DISCONTINUED | OUTPATIENT
Start: 2024-10-09 | End: 2024-10-09 | Stop reason: HOSPADM

## 2024-10-09 RX ORDER — BETAMETHASONE SODIUM PHOSPHATE AND BETAMETHASONE ACETATE 3; 3 MG/ML; MG/ML
6 INJECTION, SUSPENSION INTRA-ARTICULAR; INTRALESIONAL; INTRAMUSCULAR; SOFT TISSUE
Status: DISCONTINUED | OUTPATIENT
Start: 2024-10-09 | End: 2024-10-09 | Stop reason: HOSPADM

## 2024-10-09 RX ADMIN — LIDOCAINE HYDROCHLORIDE 4 ML: 10 INJECTION, SOLUTION INFILTRATION; PERINEURAL at 03:10

## 2024-10-09 RX ADMIN — BETAMETHASONE SODIUM PHOSPHATE AND BETAMETHASONE ACETATE 6 MG: 3; 3 INJECTION, SUSPENSION INTRA-ARTICULAR; INTRALESIONAL; INTRAMUSCULAR; SOFT TISSUE at 03:10

## 2024-10-09 NOTE — PROCEDURES
Large Joint Aspiration/Injection: bilateral knee    Date/Time: 10/9/2024 3:30 PM    Performed by: Kristen Colmenares DO  Authorized by: Kristen Colmenares, DO    Consent Done?:  Yes (Verbal)  Indications:  Arthritis, diagnostic evaluation and pain  Site marked: the procedure site was marked    Timeout: prior to procedure the correct patient, procedure, and site was verified    Prep: patient was prepped and draped in usual sterile fashion    Local anesthesia used?: No (ethyl chloride spray)      Details:  Needle Size:  22 G  Ultrasonic Guidance for needle placement?: Yes    Images are saved and documented.  Approach:  Lateral  Location:  Knee  Laterality:  Bilateral  Site:  Bilateral knee  Medications (Right):  48 mg hylan g-f 20 48 mg/6 mL  Medications (Left):  48 mg hylan g-f 20 48 mg/6 mL  Patient tolerance:  Patient tolerated the procedure well with no immediate complications     Ultrasound guidance was used for correct needle placement, the images were saved will be uploaded to EMR.  Tendon Origin: left GTB    Date/Time: 10/9/2024 3:30 PM    Performed by: Kristen Colmenares,   Authorized by: Kristen Colmenares, DO    Consent Done?:  Yes (Verbal)  Timeout: prior to procedure the correct patient, procedure, and site was verified    Indications:  Diagnostic evaluation and pain  Site marked: the procedure site was marked    Timeout: prior to procedure the correct patient, procedure, and site was verified    Location:  Hip  Hip joint: Left GT bursa/glute tendon.  Prep: patient was prepped and draped in usual sterile fashion    Ultrasonic Guidance for Needle Placement?: Yes    Needle size:  22 G (spinal)  Approach:  Posterolateral  Medications:  4 mL LIDOcaine HCL 10 mg/ml (1%) 10 mg/mL (1 %); 6 mg betamethasone acetate-betamethasone sodium phosphate 6 mg/mL  Patient tolerance:  Patient tolerated the procedure well with no immediate complications   Ultrasound guidance was used for correct needle placement, the images were  saved will be uploaded to EMR.

## 2024-10-09 NOTE — PROGRESS NOTES
[facility]    PCP: JOLENE Bailey MD    CHIEF COMPLAINT:   Chief Complaint   Patient presents with    Injections       HISTORY OF PRESENT ILLNESS: Opal Sahu is a 74 y.o. female who presents to me for evaluation and management of chronic bilateral knee and left hip pain.    Today reports, chronic bilateral knee and left lateral hip pain consistent with previous. No noted new trauma. With regards to her knee pain still with anterior and medial joint line pain worse with ambulation. She does get relief from HA injections hwoever wants to discuss other options. Requesting repeat HA injections today. History of steroid injections several years ago to her knees that she states only lasted about a week of relief and reports that she was flushed afterwards. With regards to her buttock pain similar to previous, requesting repeat steroid injection to GTB today.     Injections: knees - HA, steroids, stem cell; left hip - steroid  Therapies: has trialed formal PT without long term benefit  Bracing: none  DME: none    Review of Systems   Constitutional: Negative for fever.   HENT: Negative for drooling.    Eyes: Negative for discharge.   Respiratory: Negative for choking.    Cardiovascular: Negative for chest pain.   Genitourinary: Negative for flank pain.   Skin: Negative for wound.   Allergic/Immunologic: Negative for immunocompromised state.   Neurological: Negative for tremors and syncope.   Psychiatric/Behavioral: Negative for behavioral problems.     Past Medical History:   Past Medical History:   Diagnosis Date    Hyperlipidemia     Hypothyroid        Past Surgical History:   Past Surgical History:   Procedure Laterality Date    None         Family History:   Family History   Problem Relation Name Age of Onset    Heart disease Mother      Cancer Father prostate     Heart disease Brother      Breast cancer Other great grandmother        Medications:   Current  Outpatient Medications on File Prior to Visit   Medication Sig Dispense Refill    aspirin 81 mg Tab Every day      atorvastatin (LIPITOR) 10 MG tablet Take 1 tablet (10 mg total) by mouth every evening. 90 tablet 3    bimatoprost (LATISSE) 0.03 % ophthalmic solution Place one drop on applicator and apply evenly along the skin of the upper eyelid at base of eyelashes once daily at bedtime; repeat procedure for second eye (use a clean applicator). 5 mL 5    fluticasone propionate (FLONASE ALLERGY RELIEF) 50 mcg/actuation nasal spray 1 spray in each nostril Nasally Once a day for 30 day(s)      levothyroxine (SYNTHROID) 50 MCG tablet Take 1 tablet (50 mcg total) by mouth every morning. 90 tablet 3    mupirocin (BACTROBAN) 2 % ointment Apply topically 2 (two) times daily as needed. 22 g 1    tretinoin (RETIN-A) 0.05 % cream Apply topically every evening. 45 g 1    valACYclovir (VALTREX) 1000 MG tablet Take 2 tablets (2,000 mg total) by mouth 2 (two) times daily. for 1 day 30 tablet 1     No current facility-administered medications on file prior to visit.       Allergies:   Review of patient's allergies indicates:   Allergen Reactions    Povidone-iodine Hives     Other reaction(s): Unknown    Sulfa (sulfonamide antibiotics)      Pharmacy notified us       Social History:   Social History     Socioeconomic History    Marital status:    Occupational History     Employer: [employer]   Tobacco Use    Smoking status: Never    Smokeless tobacco: Never   Substance and Sexual Activity    Alcohol use: Yes    Drug use: No       PHYSICAL EXAMINATION:   Vitals:    10/09/24 1539   BP: (!) 150/70   BP Location: Left arm   Patient Position: Sitting   Pulse: 89     Constitutional: No apparent distress. Pleasant.  HENT: Trachea midline.  Head: Normocephalic and atraumatic.   Eyes: Right eye exhibits no discharge. Left eye exhibits no discharge. No scleral icterus.   CV: Well perfused.   Pulmonary/Chest: Effort  normal. No respiratory distress.   Abdominal: There is no guarding.   Neurological: Awake, alert and cooperative.  SKIN: Intact no apparent lesions, cut, ulcers or abrasions  EXT:  No cyanosis, clubbing, or edema.  SENSORY: Intact to light touch in the bilateral lower extemities.  MUSCKULOSKELETAL:   Muscle Strength:(0-5)                             Right     Left  Hip Flexors                5  5  Hip Abductor              5  5  Hip Adductor              5  5  Knee Extensors          5  5  Knee Flexors              5  5  Ankle Dorsiflex           5  5  Ankle Planterflex        5  5  EHL                            5  5    Reflexes: (0-4+/4)   Right     Left  Patellar(L4)  2+  2+  Ankle(S1)  2+  2+       INSPECTION:                                                                           Right                Left        Localized/Generalized swelling:                     -                       -  Muscle contours normal and symmetrical:     +                      +  Patellas symetrical and level:                         +                      +  Ecchymoses:                                                   -                       -  Erythema:                                                        -                       -  Gross deformity:                                             -                       -     KNEE DEFORMITY:            Right                Left  Normal:                       +                      +  Genu varus:                -                       -  Genu valgum:             -                       -  Genu Recervatum:     -                       -     RANGE OF MOTION:           Right                Left  Flexion:                       Full                  Full        Extension:                   Full                  Full  Other:      TENDERNESS:                        Right                Left  Medial Tibial Plateau:             -                       -  Lateral Tibial Plateau:             -                        -  Medial Femoral Condyle:        -                       -  Lateral Femoral Condyle:       -                       -  Head of the Fibula:                 -                       -  Medial joint line:                      +                       -  Lateral joint line:                      -                       -  Patella:                                    +                      +  Medial collateral lig:                -                       -  Lateral collateral lig:                -                       -  Pes Anserine:                         -                       -     SOFT TISSUE PALPATION:                                       Right                Left  Baker's cyst:                -                       -             Effusion:                      -                       -  Ballottement:               -                       -  Other:                          -                       -      JOINT STABILITY:           Right                Left  Medial collateral lig:    -                       -  Lateral collateral lig:    -                      -  Anterior draw sign:      -                      -  Posterior draw sign:    -                       -  Lachmans:                  -                       -     SPECIAL TESTS:                   Right                Left  Kaitlin Test:                       -                       -  Patella  Grinding Test:            +                       +  Knee Joint Effusion Test:        -                       -     Limited examination of the left hip with noted tenderness to great troch region without palpable mass or nodule.     Imaging  XR knee from 06/14/2017 with right greater than left tricompartmental osteoarthritis with medial greater than lateral space narrowing, Kellgren Sonido grade 2    Data Reviewed: X-ray    Supportive Actions: Independent visualization of images or test specimens.    ASSESSMENT:   1. Chronic pain of both knees    2.  Primary osteoarthritis of both knees    3. Tendinopathy of left gluteus medius        PLAN:   1. Time was spent reviewing the above diagnosis in depth with Opal today, including acute management and rehabilitation.     2. Examination today most consistent with chronic intra-articular bilateral knee pain with underlying patellofemoral syndrome. Doing better with HA injections but still having complaints and wanting to discuss more relief. We discussed options for management of her pain would be to consider injection of either short acting steroid, long acting steroid (Zilretta), or hyaluronic acid. The use, benefits, risks, and expectations of all of these types of injections was discussed at length with her today. At this time, she elects to proceed with ultrasound-guided bilateral intra-articular knee long-acting steroid (Zilretta) injection(s). We will submit for insurance approval and have patient return to clinic for completion of procedure.     Patient with less than ideal response historically to short-acting steroids and reports flushing afterwards, as such we will proceed with long-acting (zilretta) injections to bilateral knee.       3. Proceed with planned bilateral knee HA injections today. Please see procedure note for further details.       4. With regards to her left hip pain, examination again most consistent with left greater trochanteric bursa versus gluteal tendinopathy. We discussed options for management of her pain would be to consider injection of steroid. The use, benefits, risks, and expectations of all of these types of injections was discussed at length with her today. At this time, she elects to proceed with ultrasound-guided left greater trochanter bursa/glute tendon steroid injection. This procedure was completed today in clinic. Please see procedure note for further details.  We can repeat this every 3 months if appropriate.      RTC for planned bilateral knee zilretta in 1 month.      30 minutes of total time spent on the encounter, which includes face to face time and non-face to face time preparing to see the patient (eg, review of tests), obtaining and/or reviewing separately obtained history, documenting clinical information in the electronic or other health record, independently interpreting results (not separately reported), communicating results to the patient/family/caregiver, and/or care coordination (not separately reported).

## 2024-11-06 ENCOUNTER — TELEPHONE (OUTPATIENT)
Dept: PHYSICAL MEDICINE AND REHAB | Facility: CLINIC | Age: 74
End: 2024-11-06
Payer: COMMERCIAL

## 2024-11-06 NOTE — TELEPHONE ENCOUNTER
LVM. No available time for pt to be seen on the 25th.  Instructed pt to call back if she wanted her 11/13 appointment changed to another day that we could accommodate her.  No further needs at this time.   Rosie Keys RN       ----- Message from Mary sent at 11/6/2024  1:55 PM CST -----  Contact: self  Type:  Needs Medical Advice    Who Called: self  Symptoms (please be specific): pt needs to speak to nurse concerning her injection appt, pt needs to be seen the week of thanksgiving, 11/25 not 11/13.  Would the patient rather a call back or a response via [facility]? call  Best Call Back Number: [phone] (home)     Additional Information: please advise and thank you.

## 2024-11-09 NOTE — TELEPHONE ENCOUNTER
No care due was identified.  [facility] Embedded Care Due Messages. Reference number: [id number].   11/09/2024 7:43:15 AM CST

## 2024-11-11 RX ORDER — VALACYCLOVIR HYDROCHLORIDE 1 G/1
2000 TABLET, FILM COATED ORAL 2 TIMES DAILY
Qty: 30 TABLET | Refills: 1 | Status: SHIPPED | OUTPATIENT
Start: 2024-11-11 | End: 2024-11-12

## 2024-11-26 ENCOUNTER — CLINICAL SUPPORT (OUTPATIENT)
Dept: PHYSICAL MEDICINE AND REHAB | Facility: CLINIC | Age: 74
End: 2024-11-26
Payer: COMMERCIAL

## 2024-11-26 VITALS — HEART RATE: 96 BPM | SYSTOLIC BLOOD PRESSURE: 121 MMHG | DIASTOLIC BLOOD PRESSURE: 68 MMHG

## 2024-11-26 DIAGNOSIS — M25.561 CHRONIC PAIN OF BOTH KNEES: Primary | ICD-10-CM

## 2024-11-26 DIAGNOSIS — M25.562 CHRONIC PAIN OF BOTH KNEES: Primary | ICD-10-CM

## 2024-11-26 DIAGNOSIS — G89.29 CHRONIC PAIN OF BOTH KNEES: Primary | ICD-10-CM

## 2024-11-26 DIAGNOSIS — M17.0 PRIMARY OSTEOARTHRITIS OF BOTH KNEES: ICD-10-CM

## 2024-11-26 PROCEDURE — 99999 PR PBB SHADOW E&M-EST. PATIENT-LVL III: CPT | Mod: PBBFAC,,, | Performed by: PHYSICAL MEDICINE & REHABILITATION

## 2024-11-26 RX ORDER — LIDOCAINE HYDROCHLORIDE 10 MG/ML
1 INJECTION, SOLUTION INFILTRATION; PERINEURAL
Status: DISCONTINUED | OUTPATIENT
Start: 2024-11-26 | End: 2024-11-26 | Stop reason: HOSPADM

## 2024-11-26 RX ADMIN — LIDOCAINE HYDROCHLORIDE 1 ML: 10 INJECTION, SOLUTION INFILTRATION; PERINEURAL at 10:11

## 2024-11-26 NOTE — PROCEDURES
Large Joint Aspiration/Injection: bilateral knee    Date/Time: 11/26/2024 10:30 AM    Performed by: Kristen Colmenares, DO  Authorized by: Kristen Colmenares, DO    Consent Done?:  Yes (Verbal)  Indications:  Arthritis, diagnostic evaluation and pain  Site marked: the procedure site was marked    Timeout: prior to procedure the correct patient, procedure, and site was verified    Prep: patient was prepped and draped in usual sterile fashion    Local anesthesia used?: No      Details:  Needle Size:  21 G  Ultrasonic Guidance for needle placement?: Yes    Images are saved and documented.  Approach:  Lateral  Location:  Knee  Laterality:  Bilateral  Site:  Bilateral knee  Medications (Right):  32 mg triamcinolone acetonide 32 mg; 1 mL LIDOcaine HCL 10 mg/ml (1%) 10 mg/mL (1 %)  Medications (Left):  32 mg triamcinolone acetonide 32 mg; 1 mL LIDOcaine HCL 10 mg/ml (1%) 10 mg/mL (1 %)  Patient tolerance:  Patient tolerated the procedure well with no immediate complications     Ultrasound guidance was used for correct needle placement, the images were saved and will be uploaded to EMR.

## 2024-11-26 NOTE — PROGRESS NOTES
[facility] PROCEDURE NOTE    CHIEF COMPLAINT:   Chief Complaint   Patient presents with    Injections     B/L Zilretta        HISTORY OF PRESENT ILLNESS: Opal Sahu is a 74 y.o. female who presents to me for planned procedure/injection of long-acting steroid (Zilretta) for management of the below noted diagnosis.     F/u bilateral knee HA: doing well with this injection. Wanting to repeat every 6 months.     Medications:   Current Outpatient Medications on File Prior to Visit   Medication Sig Dispense Refill    aspirin 81 mg Tab Every day      atorvastatin (LIPITOR) 10 MG tablet Take 1 tablet (10 mg total) by mouth every evening. 90 tablet 3    bimatoprost (LATISSE) 0.03 % ophthalmic solution Place one drop on applicator and apply evenly along the skin of the upper eyelid at base of eyelashes once daily at bedtime; repeat procedure for second eye (use a clean applicator). 5 mL 5    fluticasone propionate (FLONASE ALLERGY RELIEF) 50 mcg/actuation nasal spray 1 spray in each nostril Nasally Once a day for 30 day(s)      levothyroxine (SYNTHROID) 50 MCG tablet Take 1 tablet (50 mcg total) by mouth every morning. 90 tablet 3    mupirocin (BACTROBAN) 2 % ointment Apply topically 2 (two) times daily as needed. 22 g 1    tretinoin (RETIN-A) 0.05 % cream Apply topically every evening. 45 g 1    valACYclovir (VALTREX) 1000 MG tablet Take 2 tablets (2,000 mg total) by mouth 2 (two) times daily. for 1 day 30 tablet 1     No current facility-administered medications on file prior to visit.       Allergies:   Review of patient's allergies indicates:   Allergen Reactions    Povidone-iodine Hives     Other reaction(s): Unknown    Sulfa (sulfonamide antibiotics)      Pharmacy notified us       Vitals:   Vitals:    11/26/24 1037   BP: 121/68   Pulse: 96       ASSESSMENT:   1. Chronic pain of both knees    2. Primary osteoarthritis of both knees        PLAN:   1. Procedure/injection  was completed for management of above diagnosis.    2. Please see procedure note from today's visit with further details.     Bilateral knee zilretta injections    RTC as needed. Okay to repeat zilretta injections every 3 months, and repeat HA injections every 6 months to bilateral knees.

## 2024-12-19 ENCOUNTER — TELEPHONE (OUTPATIENT)
Dept: FAMILY MEDICINE | Facility: CLINIC | Age: 74
End: 2024-12-19
Payer: COMMERCIAL

## 2024-12-19 NOTE — TELEPHONE ENCOUNTER
Left message on recorder for patient to call and reschedule appointment on 1/3/25 with Dr. Bailey.

## 2024-12-23 LAB — CRC RECOMMENDATION EXT: NORMAL

## 2024-12-26 ENCOUNTER — TELEPHONE (OUTPATIENT)
Dept: FAMILY MEDICINE | Facility: CLINIC | Age: 74
End: 2024-12-26
Payer: COMMERCIAL

## 2024-12-26 NOTE — TELEPHONE ENCOUNTER
----- Message from Dawna sent at 12/26/2024  9:35 AM CST -----  Contact: PT  Type:  Sooner Apoointment Request    Caller is requesting a sooner appointment.  Caller declined first available appointment listed below.  Caller will not accept being placed on the waitlist and is requesting a message be sent to doctor.  Name of Caller:PT   When is the first available appointment? [place] 02/06/24  Symptoms:F/U  Would the patient rather a call back or a response via [facility]? CALL   Best Call Back Number:[phone]  Additional Information: PROVIDER CANCELLED 01/03/24 APPT.  THANK YOU

## 2025-01-01 NOTE — TELEPHONE ENCOUNTER
----- Message from Sunil Armendariz sent at 8/21/2018  3:47 PM CDT -----  Type:  Patient Returning Call    Who Called:  Patient  Who Left Message for Patient:  TEGAN PARKS  Does the patient know what this is regarding?:  No  Best Call Back Number:  [phone]     Yes

## 2025-02-04 ENCOUNTER — LAB VISIT (OUTPATIENT)
Dept: LAB | Facility: HOSPITAL | Age: 75
End: 2025-02-04
Attending: FAMILY MEDICINE
Payer: COMMERCIAL

## 2025-02-04 DIAGNOSIS — E78.00 HYPERCHOLESTEREMIA: ICD-10-CM

## 2025-02-04 DIAGNOSIS — E03.4 HYPOTHYROIDISM DUE TO ACQUIRED ATROPHY OF THYROID: Chronic | ICD-10-CM

## 2025-02-04 LAB
ALBUMIN SERPL BCP-MCNC: 3.6 G/DL (ref 3.5–5.2)
ALP SERPL-CCNC: 50 U/L (ref 40–150)
ALT SERPL W/O P-5'-P-CCNC: 14 U/L (ref 10–44)
ANION GAP SERPL CALC-SCNC: 10 MMOL/L (ref 8–16)
AST SERPL-CCNC: 12 U/L (ref 10–40)
BILIRUB SERPL-MCNC: 1.1 MG/DL (ref 0.1–1)
BUN SERPL-MCNC: 18 MG/DL (ref 8–23)
CALCIUM SERPL-MCNC: 9.3 MG/DL (ref 8.7–10.5)
CHLORIDE SERPL-SCNC: 107 MMOL/L (ref 95–110)
CHOLEST SERPL-MCNC: 175 MG/DL (ref 120–199)
CHOLEST/HDLC SERPL: 3 {RATIO} (ref 2–5)
CO2 SERPL-SCNC: 24 MMOL/L (ref 23–29)
CREAT SERPL-MCNC: 0.7 MG/DL (ref 0.5–1.4)
ERYTHROCYTE [DISTWIDTH] IN BLOOD BY AUTOMATED COUNT: 13.1 % (ref 11.5–14.5)
EST. GFR  (NO RACE VARIABLE): >60 ML/MIN/1.73 M^2
GLUCOSE SERPL-MCNC: 97 MG/DL (ref 70–110)
HCT VFR BLD AUTO: 37.6 % (ref 37–48.5)
HDLC SERPL-MCNC: 58 MG/DL (ref 40–75)
HDLC SERPL: 33.1 % (ref 20–50)
HGB BLD-MCNC: 12 G/DL (ref 12–16)
LDLC SERPL CALC-MCNC: 98.8 MG/DL (ref 63–159)
MCH RBC QN AUTO: 30.2 PG (ref 27–31)
MCHC RBC AUTO-ENTMCNC: 31.9 G/DL (ref 32–36)
MCV RBC AUTO: 95 FL (ref 82–98)
NONHDLC SERPL-MCNC: 117 MG/DL
PLATELET # BLD AUTO: 410 K/UL (ref 150–450)
PMV BLD AUTO: 9.1 FL (ref 9.2–12.9)
POTASSIUM SERPL-SCNC: 4.2 MMOL/L (ref 3.5–5.1)
PROT SERPL-MCNC: 6.4 G/DL (ref 6–8.4)
RBC # BLD AUTO: 3.97 M/UL (ref 4–5.4)
SODIUM SERPL-SCNC: 141 MMOL/L (ref 136–145)
T4 FREE SERPL-MCNC: 1.12 NG/DL (ref 0.71–1.51)
TRIGL SERPL-MCNC: 91 MG/DL (ref 30–150)
TSH SERPL DL<=0.005 MIU/L-ACNC: 0.24 UIU/ML (ref 0.4–4)
WBC # BLD AUTO: 6.53 K/UL (ref 3.9–12.7)

## 2025-02-04 PROCEDURE — 80053 COMPREHEN METABOLIC PANEL: CPT | Performed by: FAMILY MEDICINE

## 2025-02-04 PROCEDURE — 84443 ASSAY THYROID STIM HORMONE: CPT | Performed by: FAMILY MEDICINE

## 2025-02-04 PROCEDURE — 84439 ASSAY OF FREE THYROXINE: CPT | Performed by: FAMILY MEDICINE

## 2025-02-04 PROCEDURE — 80061 LIPID PANEL: CPT | Performed by: FAMILY MEDICINE

## 2025-02-04 PROCEDURE — 85027 COMPLETE CBC AUTOMATED: CPT | Performed by: FAMILY MEDICINE

## 2025-02-04 PROCEDURE — 36415 COLL VENOUS BLD VENIPUNCTURE: CPT | Mod: PN | Performed by: FAMILY MEDICINE

## 2025-02-06 ENCOUNTER — TELEPHONE (OUTPATIENT)
Dept: FAMILY MEDICINE | Facility: CLINIC | Age: 75
End: 2025-02-06

## 2025-02-06 ENCOUNTER — OFFICE VISIT (OUTPATIENT)
Dept: FAMILY MEDICINE | Facility: CLINIC | Age: 75
End: 2025-02-06
Payer: COMMERCIAL

## 2025-02-06 VITALS
HEART RATE: 91 BPM | OXYGEN SATURATION: 95 % | HEIGHT: 66 IN | BODY MASS INDEX: 24.57 KG/M2 | DIASTOLIC BLOOD PRESSURE: 74 MMHG | SYSTOLIC BLOOD PRESSURE: 128 MMHG

## 2025-02-06 DIAGNOSIS — E03.4 HYPOTHYROIDISM DUE TO ACQUIRED ATROPHY OF THYROID: Chronic | ICD-10-CM

## 2025-02-06 DIAGNOSIS — E78.00 HYPERCHOLESTEREMIA: ICD-10-CM

## 2025-02-06 DIAGNOSIS — Z00.00 WELLNESS EXAMINATION: Primary | ICD-10-CM

## 2025-02-06 PROCEDURE — 1101F PT FALLS ASSESS-DOCD LE1/YR: CPT | Mod: CPTII,S$GLB,, | Performed by: FAMILY MEDICINE

## 2025-02-06 PROCEDURE — 3074F SYST BP LT 130 MM HG: CPT | Mod: CPTII,S$GLB,, | Performed by: FAMILY MEDICINE

## 2025-02-06 PROCEDURE — 1159F MED LIST DOCD IN RCRD: CPT | Mod: CPTII,S$GLB,, | Performed by: FAMILY MEDICINE

## 2025-02-06 PROCEDURE — 99397 PER PM REEVAL EST PAT 65+ YR: CPT | Mod: S$GLB,,, | Performed by: FAMILY MEDICINE

## 2025-02-06 PROCEDURE — 1126F AMNT PAIN NOTED NONE PRSNT: CPT | Mod: CPTII,S$GLB,, | Performed by: FAMILY MEDICINE

## 2025-02-06 PROCEDURE — 99999 PR PBB SHADOW E&M-EST. PATIENT-LVL III: CPT | Mod: PBBFAC,,, | Performed by: FAMILY MEDICINE

## 2025-02-06 PROCEDURE — 3008F BODY MASS INDEX DOCD: CPT | Mod: CPTII,S$GLB,, | Performed by: FAMILY MEDICINE

## 2025-02-06 PROCEDURE — 3078F DIAST BP <80 MM HG: CPT | Mod: CPTII,S$GLB,, | Performed by: FAMILY MEDICINE

## 2025-02-06 PROCEDURE — 3288F FALL RISK ASSESSMENT DOCD: CPT | Mod: CPTII,S$GLB,, | Performed by: FAMILY MEDICINE

## 2025-02-06 RX ORDER — FLUTICASONE PROPIONATE 50 MCG
1 SPRAY, SUSPENSION (ML) NASAL DAILY
Qty: 16 G | Refills: 1 | Status: SHIPPED | OUTPATIENT
Start: 2025-02-06

## 2025-02-06 RX ORDER — TOBRAMYCIN AND DEXAMETHASONE 3; 1 MG/ML; MG/ML
1 SUSPENSION/ DROPS OPHTHALMIC
COMMUNITY

## 2025-02-06 NOTE — PROGRESS NOTES
Subjective:       Patient ID: Opal Sahu is a 74 y.o. female.    Chief Complaint: Results and Cough    Here for wellness and follow up multiple chronic medical issues. Doing well overall and in normal state of health.      Cough  Pertinent negatives include no chest pain, chills, fever or shortness of breath.     Review of Systems   Constitutional:  Negative for chills and fever.   Respiratory:  Positive for cough. Negative for chest tightness and shortness of breath.    Cardiovascular:  Negative for chest pain, palpitations and leg swelling.   Endocrine: Negative for cold intolerance and heat intolerance.   Psychiatric/Behavioral:  Negative for decreased concentration. The patient is not nervous/anxious.        Objective:      Physical Exam  Vitals and nursing note reviewed.   Constitutional:       Appearance: She is well-developed.   HENT:      Head: Normocephalic and atraumatic.   Cardiovascular:      Rate and Rhythm: Normal rate and regular rhythm.      Heart sounds: Normal heart sounds.   Pulmonary:      Effort: Pulmonary effort is normal.      Breath sounds: Normal breath sounds.   Psychiatric:         Mood and Affect: Mood normal.         Behavior: Behavior normal.         Assessment:       1. Wellness examination    2. Hypercholesteremia    3. Hypothyroidism due to acquired atrophy of thyroid        Plan:       Wellness examination    Hypercholesteremia  -     TSH; Future; Expected date: 08/06/2025  -     Lipid Panel; Future; Expected date: 02/06/2025  -     CBC Without Differential; Future; Expected date: 02/06/2025  -     Comprehensive Metabolic Panel; Future; Expected date: 02/06/2025    Hypothyroidism due to acquired atrophy of thyroid  -     TSH; Future; Expected date: 08/06/2025  -     Lipid Panel; Future; Expected date: 02/06/2025  -     CBC Without Differential; Future; Expected date: 02/06/2025  -     Comprehensive Metabolic Panel; Future; Expected date: 02/06/2025  -     T4, Free; Future;  Expected date: 08/06/2025    Other orders  -     fluticasone propionate (FLONASE ALLERGY RELIEF) 50 mcg/actuation nasal spray; 1 spray (50 mcg total) by Each Nostril route once daily.  Dispense: 16 g; Refill: 1        Lipid stable/improved  Hypothyroid stable  Will monitor chronic medical issues and continue current plan of care.  Visit today included increased complexity associated with the care of the episodic problem lipids addressed and managing the longitudinal care of the patient due to the serious and/or complex managed problem(s) as above.      Follow up in about 6 months (around 8/6/2025), or if symptoms worsen or fail to improve.

## 2025-03-10 ENCOUNTER — PATIENT OUTREACH (OUTPATIENT)
Dept: ADMINISTRATIVE | Facility: HOSPITAL | Age: 75
End: 2025-03-10
Payer: COMMERCIAL

## 2025-03-20 ENCOUNTER — CLINICAL SUPPORT (OUTPATIENT)
Dept: PHYSICAL MEDICINE AND REHAB | Facility: CLINIC | Age: 75
End: 2025-03-20
Payer: COMMERCIAL

## 2025-03-20 VITALS — DIASTOLIC BLOOD PRESSURE: 68 MMHG | SYSTOLIC BLOOD PRESSURE: 133 MMHG | HEART RATE: 91 BPM

## 2025-03-20 DIAGNOSIS — M25.561 CHRONIC PAIN OF BOTH KNEES: Primary | ICD-10-CM

## 2025-03-20 DIAGNOSIS — G89.29 CHRONIC PAIN OF BOTH KNEES: Primary | ICD-10-CM

## 2025-03-20 DIAGNOSIS — M25.562 CHRONIC PAIN OF BOTH KNEES: Primary | ICD-10-CM

## 2025-03-20 PROCEDURE — 99999 PR PBB SHADOW E&M-EST. PATIENT-LVL III: CPT | Mod: PBBFAC,,, | Performed by: PHYSICAL MEDICINE & REHABILITATION

## 2025-03-20 PROCEDURE — 20611 DRAIN/INJ JOINT/BURSA W/US: CPT | Mod: 50,S$GLB,, | Performed by: PHYSICAL MEDICINE & REHABILITATION

## 2025-03-20 PROCEDURE — 99499 UNLISTED E&M SERVICE: CPT | Mod: S$GLB,,, | Performed by: PHYSICAL MEDICINE & REHABILITATION

## 2025-03-20 RX ORDER — TRIAMCINOLONE ACETONIDE 40 MG/ML
40 INJECTION, SUSPENSION INTRA-ARTICULAR; INTRAMUSCULAR
Status: DISCONTINUED | OUTPATIENT
Start: 2025-03-20 | End: 2025-03-20 | Stop reason: HOSPADM

## 2025-03-20 RX ORDER — LIDOCAINE HYDROCHLORIDE 10 MG/ML
4 INJECTION, SOLUTION INFILTRATION; PERINEURAL
Status: DISCONTINUED | OUTPATIENT
Start: 2025-03-20 | End: 2025-03-20 | Stop reason: HOSPADM

## 2025-03-20 RX ADMIN — LIDOCAINE HYDROCHLORIDE 4 ML: 10 INJECTION, SOLUTION INFILTRATION; PERINEURAL at 03:03

## 2025-03-20 RX ADMIN — TRIAMCINOLONE ACETONIDE 40 MG: 40 INJECTION, SUSPENSION INTRA-ARTICULAR; INTRAMUSCULAR at 03:03

## 2025-03-20 NOTE — PROGRESS NOTES
[facility] PROCEDURE NOTE    CHIEF COMPLAINT:   Chief Complaint   Patient presents with    Knee Pain       HISTORY OF PRESENT ILLNESS: Opal Sahu is a 74 y.o. female who presents to me for planned procedure/injection of steroid for management of the below noted diagnosis.     Insurance denied repeat zilretta. Requesting short acting steroids knowing they do not help as much. Left hip feeling okay today.     Medications: Medications Ordered Prior to Encounter[1]    Allergies:   Review of patient's allergies indicates:   Allergen Reactions    Povidone-iodine Hives     Other reaction(s): Unknown    Sulfa (sulfonamide antibiotics)      Pharmacy notified us       Vitals:   Vitals:    03/20/25 1523   BP: 133/68   Pulse: 91       ASSESSMENT:   1. Chronic pain of both knees        PLAN:   1. Procedure/injection was completed for management of above diagnosis.    2. Please see procedure note from today's visit with further details.     Bilateral knee steroids.     RTC for planned repeat HA injections. Okay to repeat steroids every 3 months.          [1]   Current Outpatient Medications on File Prior to Visit   Medication Sig Dispense Refill    aspirin 81 mg Tab Every day      atorvastatin (LIPITOR) 10 MG tablet Take 1 tablet (10 mg total) by mouth every evening. 90 tablet 3    bimatoprost (LATISSE) 0.03 % ophthalmic solution Place one drop on applicator and apply evenly along the skin of the upper eyelid at base of eyelashes once daily at bedtime; repeat procedure for second eye (use a clean applicator). 5 mL 5    fluticasone propionate (FLONASE ALLERGY RELIEF) 50 mcg/actuation nasal spray 1 spray (50 mcg total) by Each Nostril route once daily. 16 g 1    levothyroxine (SYNTHROID) 50 MCG tablet Take 1 tablet (50 mcg total) by mouth every morning. 90 tablet 3    tobramycin-dexAMETHasone 0.3-0.1% (TOBRADEX) 0.3-0.1 % DrpS Place 1 drop into both eyes.      tretinoin (RETIN-A)  0.05 % cream Apply topically every evening. 45 g 1    valACYclovir (VALTREX) 1000 MG tablet Take 2 tablets (2,000 mg total) by mouth 2 (two) times daily. for 1 day 30 tablet 1     No current facility-administered medications on file prior to visit.

## 2025-03-20 NOTE — PROCEDURES
Large Joint Aspiration/Injection: bilateral knee    Date/Time: 3/20/2025 3:30 PM    Performed by: Kristen Colmenares, DO  Authorized by: Kristen Colmenares, DO    Consent Done?:  Yes (Verbal)  Indications:  Arthritis, diagnostic evaluation and pain  Site marked: the procedure site was marked    Timeout: prior to procedure the correct patient, procedure, and site was verified    Prep: patient was prepped and draped in usual sterile fashion    Local anesthesia used?: No (ethyl chloride spray)      Details:  Needle Size:  21 G  Ultrasonic Guidance for needle placement?: Yes    Images are saved and documented.  Approach:  Lateral  Location:  Knee  Laterality:  Bilateral  Site:  Bilateral knee  Medications (Right):  4 mL LIDOcaine HCL 10 mg/ml (1%) 10 mg/mL (1 %); 40 mg triamcinolone acetonide 40 mg/mL  Medications (Left):  4 mL LIDOcaine HCL 10 mg/ml (1%) 10 mg/mL (1 %); 40 mg triamcinolone acetonide 40 mg/mL  Patient tolerance:  Patient tolerated the procedure well with no immediate complications     Ultrasound guidance was used for correct needle placement, the images were saved will be uploaded to EMR.

## 2025-03-28 ENCOUNTER — PATIENT MESSAGE (OUTPATIENT)
Dept: PHYSICAL MEDICINE AND REHAB | Facility: CLINIC | Age: 75
End: 2025-03-28
Payer: COMMERCIAL

## 2025-04-02 ENCOUNTER — PATIENT MESSAGE (OUTPATIENT)
Dept: PHYSICAL MEDICINE AND REHAB | Facility: CLINIC | Age: 75
End: 2025-04-02
Payer: COMMERCIAL

## 2025-04-23 ENCOUNTER — CLINICAL SUPPORT (OUTPATIENT)
Dept: PHYSICAL MEDICINE AND REHAB | Facility: CLINIC | Age: 75
End: 2025-04-23
Payer: COMMERCIAL

## 2025-04-23 VITALS — WEIGHT: 149.94 LBS | BODY MASS INDEX: 24.57 KG/M2

## 2025-04-23 DIAGNOSIS — M17.0 PRIMARY OSTEOARTHRITIS OF BOTH KNEES: ICD-10-CM

## 2025-04-23 DIAGNOSIS — M25.562 CHRONIC PAIN OF BOTH KNEES: Primary | ICD-10-CM

## 2025-04-23 DIAGNOSIS — M25.561 CHRONIC PAIN OF BOTH KNEES: Primary | ICD-10-CM

## 2025-04-23 DIAGNOSIS — G89.29 CHRONIC PAIN OF BOTH KNEES: Primary | ICD-10-CM

## 2025-04-23 PROCEDURE — 20611 DRAIN/INJ JOINT/BURSA W/US: CPT | Mod: 50,S$GLB,, | Performed by: PHYSICAL MEDICINE & REHABILITATION

## 2025-04-23 PROCEDURE — 99499 UNLISTED E&M SERVICE: CPT | Mod: S$GLB,,, | Performed by: PHYSICAL MEDICINE & REHABILITATION

## 2025-04-23 PROCEDURE — 99999 PR PBB SHADOW E&M-EST. PATIENT-LVL III: CPT | Mod: PBBFAC,,, | Performed by: PHYSICAL MEDICINE & REHABILITATION

## 2025-04-23 NOTE — PROGRESS NOTES
[facility] PROCEDURE NOTE    CHIEF COMPLAINT:   Chief Complaint   Patient presents with    Injections     Synv-1. B/l knee injection. Pt. has no concerns or any pain.       HISTORY OF PRESENT ILLNESS: Opal Sahu is a 74 y.o. female who presents to me for planned procedure/injection of hyaluronic acid for management of the below noted diagnosis.     Medications: Medications Ordered Prior to Encounter[1]    Allergies:   Review of patient's allergies indicates:   Allergen Reactions    Povidone-iodine Hives     Other reaction(s): Unknown    Sulfa (sulfonamide antibiotics)      Pharmacy notified us       Vitals: There were no vitals filed for this visit.    ASSESSMENT:   1. Chronic pain of both knees    2. Primary osteoarthritis of both knees        PLAN:   1. Procedure/injection was completed for management of above diagnosis.    2. Please see procedure note from today's visit with further details.     Bilateral knee HA    RTC as needed. Okay to repeat HA injections every 6 months.          [1]   Current Outpatient Medications on File Prior to Visit   Medication Sig Dispense Refill    aspirin 81 mg Tab Every day      atorvastatin (LIPITOR) 10 MG tablet Take 1 tablet (10 mg total) by mouth every evening. 90 tablet 3    bimatoprost (LATISSE) 0.03 % ophthalmic solution Place one drop on applicator and apply evenly along the skin of the upper eyelid at base of eyelashes once daily at bedtime; repeat procedure for second eye (use a clean applicator). 5 mL 5    fluticasone propionate (FLONASE ALLERGY RELIEF) 50 mcg/actuation nasal spray 1 spray (50 mcg total) by Each Nostril route once daily. 16 g 1    levothyroxine (SYNTHROID) 50 MCG tablet Take 1 tablet (50 mcg total) by mouth every morning. 90 tablet 3    tobramycin-dexAMETHasone 0.3-0.1% (TOBRADEX) 0.3-0.1 % DrpS Place 1 drop into both eyes.      tretinoin (RETIN-A) 0.05 % cream Apply topically every evening. 45 g 1     valACYclovir (VALTREX) 1000 MG tablet Take 2 tablets (2,000 mg total) by mouth 2 (two) times daily. for 1 day 30 tablet 1     No current facility-administered medications on file prior to visit.

## 2025-04-23 NOTE — PROCEDURES
Large Joint Aspiration/Injection: bilateral knee    Date/Time: 4/23/2025 2:00 PM    Performed by: Kristen Colmenares, DO  Authorized by: Kristen Colmenares, DO    Consent Done?:  Yes (Verbal)  Indications:  Arthritis, diagnostic evaluation and pain  Site marked: the procedure site was marked    Timeout: prior to procedure the correct patient, procedure, and site was verified    Prep: patient was prepped and draped in usual sterile fashion    Local anesthesia used?: No (ethyl chloride spray)      Details:  Needle Size:  21 G  Ultrasonic Guidance for needle placement?: Yes    Images are saved and documented.  Approach:  Lateral  Location:  Knee  Laterality:  Bilateral  Site:  Bilateral knee  Medications (Right):  48 mg hylan g-f 20 48 mg/6 mL  Medications (Left):  48 mg hylan g-f 20 48 mg/6 mL  Patient tolerance:  Patient tolerated the procedure well with no immediate complications     Ultrasound guidance was used for correct needle placement, the images were saved will be uploaded to EMR.

## 2025-06-16 DIAGNOSIS — E78.00 HYPERCHOLESTEREMIA: ICD-10-CM

## 2025-06-16 RX ORDER — ATORVASTATIN CALCIUM 10 MG/1
10 TABLET, FILM COATED ORAL NIGHTLY
Qty: 90 TABLET | Refills: 2 | Status: SHIPPED | OUTPATIENT
Start: 2025-06-16

## 2025-06-16 NOTE — TELEPHONE ENCOUNTER
No care due was identified.  [facility] Embedded Care Due Messages. Reference number: [id number].   6/16/2025 10:20:56 AM CDT

## 2025-06-16 NOTE — TELEPHONE ENCOUNTER
Refill Decision Note   Opal Sahu  is requesting a refill authorization.  Brief Assessment and Rationale for Refill:  Approve     Medication Therapy Plan:         Comments:     Note composed:12:28 PM 06/16/2025

## 2025-06-16 NOTE — TELEPHONE ENCOUNTER
Copied from CRM #[id number]. Topic: Medications - Medication Refill  >> Jun 16, 2025 10:22 AM Srinath wrote:  Type:  RX Refill Request    Who Called: Pt   Refill or New Rx:refill  RX Name and Strength:atorvastatin (LIPITOR) 10 MG tablet   How is the patient currently taking it? (ex. 1XDay): as directed  Is this a 30 day or 90 day RX: 90  Preferred Pharmacy with phone number:  [facility] [address] [facility] [address]  [address] [address]  Phone: [phone] Fax: [phone]  Local or Mail Order:local  Ordering Provider:Leo  Would the patient rather a call back or a response via [facility]? Call  Best Call Back Number:[phone]   Additional Information: Pt req refill. Also would like a recommendation of a female gyn w/ in [facility]. Thank you.

## 2025-06-20 DIAGNOSIS — E03.4 HYPOTHYROIDISM DUE TO ACQUIRED ATROPHY OF THYROID: Chronic | ICD-10-CM

## 2025-06-20 RX ORDER — LEVOTHYROXINE SODIUM 50 UG/1
50 TABLET ORAL EVERY MORNING
Qty: 90 TABLET | Refills: 3 | Status: SHIPPED | OUTPATIENT
Start: 2025-06-20 | End: 2025-06-20 | Stop reason: SDUPTHER

## 2025-06-20 RX ORDER — LEVOTHYROXINE SODIUM 50 UG/1
50 TABLET ORAL EVERY MORNING
Qty: 90 TABLET | Refills: 3 | Status: SHIPPED | OUTPATIENT
Start: 2025-06-20

## 2025-06-20 NOTE — TELEPHONE ENCOUNTER
No care due was identified.  [facility] Embedded Care Due Messages. Reference number: [id number].   6/20/2025 9:24:19 AM CDT

## 2025-06-20 NOTE — TELEPHONE ENCOUNTER
Copied from CRM #[id number]. Topic: Medications - Medication Refill  >> Jun 20, 2025  9:02 AM Ifeoma wrote:  Type:  RX Refill Request    Who Called: pt  Refill or New Rx:refill  RX Name and Strength: Disp Refills Start End   levothyroxine (SYNTHROID) 50 MCG tablet 90 tablet 3 6/3/2024 -   Sig - Route: Take 1 tablet (50 mcg total) by mouth every morning. - Oral   Sent to pharmacy as: levothyroxine (SYNTHROID) 50 MCG tablet   E-Prescribing Status: Receipt confirmed by p   How is the patient currently taking it? (ex. 1XDay):see above  Is this a 30 day or 90 day RX:see above  Preferred Pharmacy with phone number:    [facility]/pharmacy #[id number] - [place] - [address]  [place] [address]  Phone: [phone] Fax: [phone]  Local or Mail Order:local  Ordering Provider:   Would the patient rather a call back or a response via [facility]?  call  Best Call Back Number:. [phone]      Additional Information:

## 2025-06-20 NOTE — TELEPHONE ENCOUNTER
No care due was identified.  [facility] Embedded Care Due Messages. Reference number: [id number].   6/20/2025 2:33:04 PM CDT

## 2025-06-21 ENCOUNTER — TELEPHONE (OUTPATIENT)
Dept: FAMILY MEDICINE | Facility: CLINIC | Age: 75
End: 2025-06-21
Payer: COMMERCIAL

## 2025-06-21 NOTE — TELEPHONE ENCOUNTER
Copied from CRM #[id number]. Topic: Medications - Medication Refill  >> Jun 20, 2025  4:34 PM Lydia wrote:  Type:  RX Refill Request    Who Called: Pt   Refill or New Rx:Refill   RX Name and Strength: Disp Refills Start End   levothyroxine (SYNTHROID) 50 MCG tablet 90 tablet 3 6/20/2025 -   Sig - Route: Take 1 tablet (50 mcg total) by mouth every morning. - Oral   Sent to pharmacy as: levothyroxine (SYNTHROID) 50 MCG tablet   E-Prescribing Status: Receipt confirmed by pharmacy (6/20/2025  4:16 PM CDT)       Preferred Pharmacy with phone number:  [facility]/pharmacy #[id number] - [place] - [address]  [address]  [place] [address]  Phone: [phone] Fax: [phone]     Local or Mail Order:local   Ordering Provider:Leo   Would the patient rather a call back or a response via [facility]? Call back  Best Call Back Number: [phone]   Additional Information: Pt needed the name brand RX sent to [facility] today she is going out of town

## 2025-06-25 ENCOUNTER — CLINICAL SUPPORT (OUTPATIENT)
Dept: PHYSICAL MEDICINE AND REHAB | Facility: CLINIC | Age: 75
End: 2025-06-25
Payer: COMMERCIAL

## 2025-06-25 VITALS — HEART RATE: 90 BPM | SYSTOLIC BLOOD PRESSURE: 146 MMHG | DIASTOLIC BLOOD PRESSURE: 70 MMHG

## 2025-06-25 DIAGNOSIS — M25.562 CHRONIC PAIN OF BOTH KNEES: Primary | ICD-10-CM

## 2025-06-25 DIAGNOSIS — M25.561 CHRONIC PAIN OF BOTH KNEES: Primary | ICD-10-CM

## 2025-06-25 DIAGNOSIS — G89.29 CHRONIC PAIN OF BOTH KNEES: Primary | ICD-10-CM

## 2025-06-25 PROCEDURE — 99999 PR PBB SHADOW E&M-EST. PATIENT-LVL III: CPT | Mod: PBBFAC,,, | Performed by: PHYSICAL MEDICINE & REHABILITATION

## 2025-06-25 PROCEDURE — 20611 DRAIN/INJ JOINT/BURSA W/US: CPT | Mod: 50,S$GLB,, | Performed by: PHYSICAL MEDICINE & REHABILITATION

## 2025-06-25 PROCEDURE — 99499 UNLISTED E&M SERVICE: CPT | Mod: S$GLB,,, | Performed by: PHYSICAL MEDICINE & REHABILITATION

## 2025-06-25 RX ORDER — TRIAMCINOLONE ACETONIDE 40 MG/ML
40 INJECTION, SUSPENSION INTRA-ARTICULAR; INTRAMUSCULAR
Status: DISCONTINUED | OUTPATIENT
Start: 2025-06-25 | End: 2025-06-25 | Stop reason: HOSPADM

## 2025-06-25 RX ORDER — LIDOCAINE HYDROCHLORIDE 10 MG/ML
4 INJECTION, SOLUTION INFILTRATION; PERINEURAL
Status: DISCONTINUED | OUTPATIENT
Start: 2025-06-25 | End: 2025-06-25 | Stop reason: HOSPADM

## 2025-06-25 RX ADMIN — TRIAMCINOLONE ACETONIDE 40 MG: 40 INJECTION, SUSPENSION INTRA-ARTICULAR; INTRAMUSCULAR at 01:06

## 2025-06-25 RX ADMIN — LIDOCAINE HYDROCHLORIDE 4 ML: 10 INJECTION, SOLUTION INFILTRATION; PERINEURAL at 01:06

## 2025-06-25 NOTE — PROCEDURES
Large Joint Aspiration/Injection: bilateral knee    Date/Time: 6/25/2025 1:00 PM    Performed by: Kristen Colmenares, DO  Authorized by: Kristen Colmenares, DO    Consent Done?:  Yes (Verbal)  Indications:  Arthritis, diagnostic evaluation and pain  Site marked: the procedure site was marked    Timeout: prior to procedure the correct patient, procedure, and site was verified    Prep: patient was prepped and draped in usual sterile fashion    Local anesthesia used?: No (ethyl chloride spray)      Details:  Needle Size:  21 G  Ultrasonic Guidance for needle placement?: Yes    Images are saved and documented.  Approach:  Lateral  Location:  Knee  Laterality:  Bilateral  Site:  Bilateral knee  Medications (Right):  4 mL LIDOcaine HCL 10 mg/ml (1%) 10 mg/mL (1 %); 40 mg triamcinolone acetonide 40 mg/mL  Medications (Left):  4 mL LIDOcaine HCL 10 mg/ml (1%) 10 mg/mL (1 %); 40 mg triamcinolone acetonide 40 mg/mL  Patient tolerance:  Patient tolerated the procedure well with no immediate complications     Ultrasound guidance was used for correct needle placement, the images were saved will be uploaded to EMR.

## 2025-06-25 NOTE — PROGRESS NOTES
[facility] PROCEDURE NOTE    CHIEF COMPLAINT:   Chief Complaint   Patient presents with    Injections     Steroid to Both Knees        HISTORY OF PRESENT ILLNESS: Opal Sahu is a 74 y.o. female who presents to me for planned procedure/injection of steroid for management of the below noted diagnosis.     Changing insurance in August and wants to resubmit at that time to see if she can get approved for zilretta again as this was more helpful in management of her pain than the short acting steroids.     Medications: Medications Ordered Prior to Encounter[1]    Allergies:   Review of patient's allergies indicates:   Allergen Reactions    Povidone-iodine Hives     Other reaction(s): Unknown    Sulfa (sulfonamide antibiotics)      Pharmacy notified us       Vitals:   Vitals:    06/25/25 1307   BP: (!) 146/70   Pulse: 90       ASSESSMENT:   1. Chronic pain of both knees        PLAN:   1. Procedure/injection was completed for management of above diagnosis.    2. Please see procedure note from today's visit with further details.     Bilateral knee steroids.     RTC for planned repeat HA injections. Okay to repeat steroids every 3 months. Will submit for approval of zilretta at next round.          [1]   Current Outpatient Medications on File Prior to Visit   Medication Sig Dispense Refill    aspirin 81 mg Tab Every day      atorvastatin (LIPITOR) 10 MG tablet Take 1 tablet (10 mg total) by mouth every evening. 90 tablet 2    bimatoprost (LATISSE) 0.03 % ophthalmic solution Place one drop on applicator and apply evenly along the skin of the upper eyelid at base of eyelashes once daily at bedtime; repeat procedure for second eye (use a clean applicator). 5 mL 5    fluticasone propionate (FLONASE ALLERGY RELIEF) 50 mcg/actuation nasal spray 1 spray (50 mcg total) by Each Nostril route once daily. 16 g 1    levothyroxine (SYNTHROID) 50 MCG tablet Take 1 tablet (50 mcg total) by  mouth every morning. 90 tablet 3    tobramycin-dexAMETHasone 0.3-0.1% (TOBRADEX) 0.3-0.1 % DrpS Place 1 drop into both eyes.      tretinoin (RETIN-A) 0.05 % cream Apply topically every evening. 45 g 1    valACYclovir (VALTREX) 1000 MG tablet Take 2 tablets (2,000 mg total) by mouth 2 (two) times daily. for 1 day 30 tablet 1     No current facility-administered medications on file prior to visit.

## 2025-07-17 DIAGNOSIS — M25.561 CHRONIC PAIN OF BOTH KNEES: Primary | ICD-10-CM

## 2025-07-17 DIAGNOSIS — G89.29 CHRONIC PAIN OF BOTH KNEES: Primary | ICD-10-CM

## 2025-07-17 DIAGNOSIS — M25.562 CHRONIC PAIN OF BOTH KNEES: Primary | ICD-10-CM

## 2025-08-12 ENCOUNTER — TELEPHONE (OUTPATIENT)
Dept: PHYSICAL MEDICINE AND REHAB | Facility: CLINIC | Age: 75
End: 2025-08-12
Payer: MEDICARE

## 2025-08-18 ENCOUNTER — CLINICAL SUPPORT (OUTPATIENT)
Dept: PHYSICAL MEDICINE AND REHAB | Facility: CLINIC | Age: 75
End: 2025-08-18
Payer: MEDICARE

## 2025-08-18 VITALS — DIASTOLIC BLOOD PRESSURE: 76 MMHG | SYSTOLIC BLOOD PRESSURE: 133 MMHG | HEART RATE: 91 BPM

## 2025-08-18 DIAGNOSIS — M67.952 TENDINOPATHY OF LEFT GLUTEUS MEDIUS: Primary | ICD-10-CM

## 2025-08-18 PROCEDURE — 76942 ECHO GUIDE FOR BIOPSY: CPT | Mod: S$GLB,,, | Performed by: PHYSICAL MEDICINE & REHABILITATION

## 2025-08-18 PROCEDURE — 99499 UNLISTED E&M SERVICE: CPT | Mod: S$GLB,,, | Performed by: PHYSICAL MEDICINE & REHABILITATION

## 2025-08-18 PROCEDURE — 20551 NJX 1 TENDON ORIGIN/INSJ: CPT | Mod: S$GLB,,, | Performed by: PHYSICAL MEDICINE & REHABILITATION

## 2025-08-18 PROCEDURE — 99999 PR PBB SHADOW E&M-EST. PATIENT-LVL III: CPT | Mod: PBBFAC,,, | Performed by: PHYSICAL MEDICINE & REHABILITATION

## 2025-08-18 RX ORDER — BETAMETHASONE SODIUM PHOSPHATE AND BETAMETHASONE ACETATE 3; 3 MG/ML; MG/ML
6 INJECTION, SUSPENSION INTRA-ARTICULAR; INTRALESIONAL; INTRAMUSCULAR; SOFT TISSUE
Status: DISCONTINUED | OUTPATIENT
Start: 2025-08-18 | End: 2025-08-18 | Stop reason: HOSPADM

## 2025-08-18 RX ORDER — LIDOCAINE HYDROCHLORIDE 10 MG/ML
4 INJECTION, SOLUTION INFILTRATION; PERINEURAL
Status: DISCONTINUED | OUTPATIENT
Start: 2025-08-18 | End: 2025-08-18 | Stop reason: HOSPADM

## 2025-08-18 RX ADMIN — BETAMETHASONE SODIUM PHOSPHATE AND BETAMETHASONE ACETATE 6 MG: 3; 3 INJECTION, SUSPENSION INTRA-ARTICULAR; INTRALESIONAL; INTRAMUSCULAR; SOFT TISSUE at 02:08

## 2025-08-18 RX ADMIN — LIDOCAINE HYDROCHLORIDE 4 ML: 10 INJECTION, SOLUTION INFILTRATION; PERINEURAL at 02:08

## 2025-08-20 ENCOUNTER — LAB VISIT (OUTPATIENT)
Dept: LAB | Facility: HOSPITAL | Age: 75
End: 2025-08-20
Attending: FAMILY MEDICINE
Payer: MEDICARE

## 2025-08-20 DIAGNOSIS — E03.4 HYPOTHYROIDISM DUE TO ACQUIRED ATROPHY OF THYROID: Chronic | ICD-10-CM

## 2025-08-20 DIAGNOSIS — E78.00 HYPERCHOLESTEREMIA: ICD-10-CM

## 2025-08-20 LAB
ALBUMIN SERPL BCP-MCNC: 3.9 G/DL (ref 3.5–5.2)
ALP SERPL-CCNC: 53 UNIT/L (ref 40–150)
ALT SERPL W/O P-5'-P-CCNC: 17 UNIT/L (ref 0–55)
ANION GAP (OHS): 12 MMOL/L (ref 8–16)
AST SERPL-CCNC: 18 UNIT/L (ref 0–50)
BILIRUB SERPL-MCNC: 1 MG/DL (ref 0.1–1)
BUN SERPL-MCNC: 20 MG/DL (ref 8–23)
CALCIUM SERPL-MCNC: 9.4 MG/DL (ref 8.7–10.5)
CHLORIDE SERPL-SCNC: 104 MMOL/L (ref 95–110)
CHOLEST SERPL-MCNC: 217 MG/DL (ref 120–199)
CHOLEST/HDLC SERPL: 3.8 {RATIO} (ref 2–5)
CO2 SERPL-SCNC: 21 MMOL/L (ref 23–29)
CREAT SERPL-MCNC: 0.8 MG/DL (ref 0.5–1.4)
ERYTHROCYTE [DISTWIDTH] IN BLOOD BY AUTOMATED COUNT: 13.4 % (ref 11.5–14.5)
GFR SERPLBLD CREATININE-BSD FMLA CKD-EPI: >60 ML/MIN/1.73/M2
GLUCOSE SERPL-MCNC: 87 MG/DL (ref 70–110)
HCT VFR BLD AUTO: 38.1 % (ref 37–48.5)
HDLC SERPL-MCNC: 57 MG/DL (ref 40–75)
HDLC SERPL: 26.3 % (ref 20–50)
HGB BLD-MCNC: 12.6 GM/DL (ref 12–16)
LDLC SERPL CALC-MCNC: 135 MG/DL (ref 63–159)
MCH RBC QN AUTO: 29.9 PG (ref 27–31)
MCHC RBC AUTO-ENTMCNC: 33.1 G/DL (ref 32–36)
MCV RBC AUTO: 91 FL (ref 82–98)
NONHDLC SERPL-MCNC: 160 MG/DL
PLATELET # BLD AUTO: 454 K/UL (ref 150–450)
PMV BLD AUTO: 9 FL (ref 9.2–12.9)
POTASSIUM SERPL-SCNC: 4 MMOL/L (ref 3.5–5.1)
PROT SERPL-MCNC: 6.7 GM/DL (ref 6–8.4)
RBC # BLD AUTO: 4.21 M/UL (ref 4–5.4)
SODIUM SERPL-SCNC: 137 MMOL/L (ref 136–145)
T4 FREE SERPL-MCNC: 1.16 NG/DL (ref 0.71–1.51)
TRIGL SERPL-MCNC: 125 MG/DL (ref 30–150)
TSH SERPL-ACNC: 0.38 UIU/ML (ref 0.4–4)
WBC # BLD AUTO: 9.27 K/UL (ref 3.9–12.7)

## 2025-08-20 PROCEDURE — 80053 COMPREHEN METABOLIC PANEL: CPT

## 2025-08-20 PROCEDURE — 84443 ASSAY THYROID STIM HORMONE: CPT

## 2025-08-20 PROCEDURE — 36415 COLL VENOUS BLD VENIPUNCTURE: CPT | Mod: PN

## 2025-08-20 PROCEDURE — 85027 COMPLETE CBC AUTOMATED: CPT

## 2025-08-20 PROCEDURE — 80061 LIPID PANEL: CPT

## 2025-08-20 PROCEDURE — 84439 ASSAY OF FREE THYROXINE: CPT

## 2025-08-22 ENCOUNTER — OFFICE VISIT (OUTPATIENT)
Dept: FAMILY MEDICINE | Facility: CLINIC | Age: 75
End: 2025-08-22
Payer: MEDICARE

## 2025-08-22 ENCOUNTER — TELEPHONE (OUTPATIENT)
Dept: FAMILY MEDICINE | Facility: CLINIC | Age: 75
End: 2025-08-22
Payer: MEDICARE

## 2025-08-22 VITALS
HEIGHT: 65 IN | HEART RATE: 96 BPM | DIASTOLIC BLOOD PRESSURE: 78 MMHG | OXYGEN SATURATION: 96 % | BODY MASS INDEX: 24.99 KG/M2 | SYSTOLIC BLOOD PRESSURE: 118 MMHG | WEIGHT: 150 LBS

## 2025-08-22 DIAGNOSIS — E03.4 HYPOTHYROIDISM DUE TO ACQUIRED ATROPHY OF THYROID: Chronic | ICD-10-CM

## 2025-08-22 DIAGNOSIS — Z12.39 ENCOUNTER FOR SCREENING FOR MALIGNANT NEOPLASM OF BREAST, UNSPECIFIED SCREENING MODALITY: ICD-10-CM

## 2025-08-22 DIAGNOSIS — E78.00 HYPERCHOLESTEREMIA: Primary | Chronic | ICD-10-CM

## 2025-08-22 DIAGNOSIS — Z12.31 ENCOUNTER FOR SCREENING MAMMOGRAM FOR MALIGNANT NEOPLASM OF BREAST: ICD-10-CM

## 2025-08-22 DIAGNOSIS — E03.9 HYPOTHYROIDISM, UNSPECIFIED TYPE: Chronic | ICD-10-CM

## 2025-08-22 PROCEDURE — 99999 PR PBB SHADOW E&M-EST. PATIENT-LVL IV: CPT | Mod: PBBFAC,,, | Performed by: FAMILY MEDICINE

## 2025-08-22 RX ORDER — VALACYCLOVIR HYDROCHLORIDE 1 G/1
2000 TABLET, FILM COATED ORAL 2 TIMES DAILY
Qty: 30 TABLET | Refills: 1 | Status: SHIPPED | OUTPATIENT
Start: 2025-08-22 | End: 2025-08-23

## 2025-08-22 RX ORDER — LEVOTHYROXINE SODIUM 50 UG/1
50 TABLET ORAL
Qty: 90 TABLET | Refills: 3 | Status: SHIPPED | OUTPATIENT
Start: 2025-08-22 | End: 2026-08-22

## 2025-08-25 ENCOUNTER — TELEPHONE (OUTPATIENT)
Dept: PHYSICAL MEDICINE AND REHAB | Facility: CLINIC | Age: 75
End: 2025-08-25
Payer: MEDICARE

## 2025-08-25 DIAGNOSIS — M67.952 TENDINOPATHY OF LEFT GLUTEUS MEDIUS: ICD-10-CM

## 2025-08-25 DIAGNOSIS — G89.29 CHRONIC PAIN OF BOTH KNEES: Primary | ICD-10-CM

## 2025-08-25 DIAGNOSIS — M25.561 CHRONIC PAIN OF BOTH KNEES: Primary | ICD-10-CM

## 2025-08-25 DIAGNOSIS — M25.562 CHRONIC PAIN OF BOTH KNEES: Primary | ICD-10-CM

## 2025-08-25 DIAGNOSIS — M17.0 PRIMARY OSTEOARTHRITIS OF BOTH KNEES: ICD-10-CM

## 2025-08-26 ENCOUNTER — TELEPHONE (OUTPATIENT)
Dept: PHYSICAL MEDICINE AND REHAB | Facility: CLINIC | Age: 75
End: 2025-08-26
Payer: MEDICARE

## 2025-09-04 ENCOUNTER — HOSPITAL ENCOUNTER (OUTPATIENT)
Dept: RADIOLOGY | Facility: HOSPITAL | Age: 75
Discharge: HOME | End: 2025-09-04
Attending: FAMILY MEDICINE
Payer: MEDICARE

## 2025-09-04 DIAGNOSIS — Z12.39 ENCOUNTER FOR SCREENING FOR MALIGNANT NEOPLASM OF BREAST, UNSPECIFIED SCREENING MODALITY: ICD-10-CM

## 2025-09-04 DIAGNOSIS — Z12.31 ENCOUNTER FOR SCREENING MAMMOGRAM FOR MALIGNANT NEOPLASM OF BREAST: ICD-10-CM

## 2025-09-04 PROCEDURE — 77063 BREAST TOMOSYNTHESIS BI: CPT | Mod: TC,PO

## 2025-09-04 PROCEDURE — 77063 BREAST TOMOSYNTHESIS BI: CPT | Mod: 26,,, | Performed by: RADIOLOGY

## 2025-09-04 PROCEDURE — 77067 SCR MAMMO BI INCL CAD: CPT | Mod: 26,,, | Performed by: RADIOLOGY
